# Patient Record
Sex: MALE | Race: WHITE | NOT HISPANIC OR LATINO | Employment: OTHER | URBAN - NONMETROPOLITAN AREA
[De-identification: names, ages, dates, MRNs, and addresses within clinical notes are randomized per-mention and may not be internally consistent; named-entity substitution may affect disease eponyms.]

---

## 2023-02-02 PROBLEM — M16.12 ARTHRITIS OF LEFT HIP: Status: ACTIVE | Noted: 2023-02-02

## 2023-02-02 PROBLEM — R19.5 POSITIVE COLORECTAL CANCER SCREENING USING COLOGUARD TEST: Status: ACTIVE | Noted: 2023-02-02

## 2023-02-02 PROBLEM — R51.9 FREQUENT HEADACHES: Status: ACTIVE | Noted: 2023-02-02

## 2023-02-02 PROBLEM — E78.5 DYSLIPIDEMIA: Status: ACTIVE | Noted: 2023-02-02

## 2023-02-02 PROBLEM — R06.83 SNORING: Status: ACTIVE | Noted: 2023-02-02

## 2023-02-02 PROBLEM — H40.9 GLAUCOMA, BILATERAL: Status: ACTIVE | Noted: 2023-02-02

## 2023-02-02 PROBLEM — E66.01 MORBID OBESITY (MULTI): Status: ACTIVE | Noted: 2023-02-02

## 2023-02-02 PROBLEM — E78.9 LIPID DISORDER: Status: ACTIVE | Noted: 2023-02-02

## 2023-02-02 PROBLEM — H61.20 CERUMEN IMPACTION: Status: ACTIVE | Noted: 2023-02-02

## 2023-02-02 PROBLEM — H66.90 OTITIS MEDIA: Status: ACTIVE | Noted: 2023-02-02

## 2023-02-02 PROBLEM — M79.10 GENERALIZED MUSCLE ACHE: Status: ACTIVE | Noted: 2023-02-02

## 2023-02-02 PROBLEM — H26.9: Status: ACTIVE | Noted: 2023-02-02

## 2023-02-02 PROBLEM — M54.50 LOW BACK PAIN: Status: ACTIVE | Noted: 2023-02-02

## 2023-02-02 PROBLEM — I10 BENIGN ESSENTIAL HYPERTENSION: Status: ACTIVE | Noted: 2023-02-02

## 2023-02-02 PROBLEM — I10 HTN (HYPERTENSION), BENIGN: Status: ACTIVE | Noted: 2023-02-02

## 2023-02-02 PROBLEM — E11.9 TYPE 2 DIABETES MELLITUS (MULTI): Status: ACTIVE | Noted: 2023-02-02

## 2023-02-02 PROBLEM — F41.9 ANXIETY DISORDER: Status: ACTIVE | Noted: 2023-02-02

## 2023-02-02 PROBLEM — J32.9 SINUS INFECTION: Status: ACTIVE | Noted: 2023-02-02

## 2023-02-02 PROBLEM — E66.01 OBESITY, MORBID, BMI 40.0-49.9 (MULTI): Status: ACTIVE | Noted: 2023-02-02

## 2023-02-02 PROBLEM — E78.5 HYPERLIPIDEMIA: Status: ACTIVE | Noted: 2023-02-02

## 2023-02-02 PROBLEM — E11.9 DIABETES MELLITUS TYPE 2, CONTROLLED (MULTI): Status: ACTIVE | Noted: 2023-02-02

## 2023-02-02 PROBLEM — H35.00 RETINOPATHY, BACKGROUND, NONPROLIFERATIVE, MILD: Status: ACTIVE | Noted: 2023-02-02

## 2023-02-02 PROBLEM — G47.33 OBSTRUCTIVE SLEEP APNEA, ADULT: Status: ACTIVE | Noted: 2023-02-02

## 2023-02-02 PROBLEM — M10.9 GOUT: Status: ACTIVE | Noted: 2023-02-02

## 2023-02-02 RX ORDER — IBANDRONATE SODIUM 150 MG/1
TABLET, FILM COATED ORAL
COMMUNITY
Start: 2021-07-16 | End: 2023-03-16 | Stop reason: ALTCHOICE

## 2023-02-02 RX ORDER — INSULIN GLARGINE 100 [IU]/ML
INJECTION, SOLUTION SUBCUTANEOUS
COMMUNITY
Start: 2021-02-22 | End: 2023-05-31 | Stop reason: SDUPTHER

## 2023-02-02 RX ORDER — OLMESARTAN MEDOXOMIL 40 MG/1
1 TABLET ORAL DAILY
COMMUNITY
Start: 2017-10-26 | End: 2023-06-02 | Stop reason: SDUPTHER

## 2023-02-02 RX ORDER — GLIPIZIDE 10 MG/1
10 TABLET, FILM COATED, EXTENDED RELEASE ORAL
COMMUNITY
Start: 2020-10-04 | End: 2023-12-05

## 2023-02-02 RX ORDER — LANCETS
EACH MISCELLANEOUS
COMMUNITY
End: 2023-03-16 | Stop reason: ALTCHOICE

## 2023-02-02 RX ORDER — NADOLOL 40 MG/1
1 TABLET ORAL
COMMUNITY
Start: 2021-04-19 | End: 2023-09-06

## 2023-02-02 RX ORDER — LIRAGLUTIDE 6 MG/ML
INJECTION SUBCUTANEOUS
COMMUNITY
Start: 2019-06-03 | End: 2023-03-16 | Stop reason: ALTCHOICE

## 2023-02-02 RX ORDER — SPIRONOLACTONE 50 MG/1
1 TABLET, FILM COATED ORAL DAILY
COMMUNITY
Start: 2021-08-16 | End: 2023-09-06

## 2023-02-02 RX ORDER — HYDROCODONE BITARTRATE AND ACETAMINOPHEN 5; 325 MG/1; MG/1
TABLET ORAL
COMMUNITY
End: 2023-03-16 | Stop reason: ALTCHOICE

## 2023-02-02 RX ORDER — DILTIAZEM HYDROCHLORIDE 360 MG/1
CAPSULE, EXTENDED RELEASE ORAL
COMMUNITY
Start: 2022-03-23 | End: 2023-03-16 | Stop reason: ALTCHOICE

## 2023-02-02 RX ORDER — EZETIMIBE 10 MG/1
1 TABLET ORAL DAILY
COMMUNITY
Start: 2020-12-22 | End: 2024-02-28 | Stop reason: SDUPTHER

## 2023-02-02 RX ORDER — ISOPROPYL ALCOHOL 70 ML/100ML
SWAB TOPICAL
COMMUNITY
End: 2023-03-16 | Stop reason: ALTCHOICE

## 2023-02-02 RX ORDER — PNEUMOCOCCAL 20-VALENT CONJUGATE VACCINE 2.2; 2.2; 2.2; 2.2; 2.2; 2.2; 2.2; 2.2; 2.2; 2.2; 2.2; 2.2; 2.2; 2.2; 2.2; 2.2; 4.4; 2.2; 2.2; 2.2 UG/.5ML; UG/.5ML; UG/.5ML; UG/.5ML; UG/.5ML; UG/.5ML; UG/.5ML; UG/.5ML; UG/.5ML; UG/.5ML; UG/.5ML; UG/.5ML; UG/.5ML; UG/.5ML; UG/.5ML; UG/.5ML; UG/.5ML; UG/.5ML; UG/.5ML; UG/.5ML
0.5 INJECTION, SUSPENSION INTRAMUSCULAR ONCE
COMMUNITY
End: 2023-03-16 | Stop reason: ALTCHOICE

## 2023-02-02 RX ORDER — CLONIDINE HYDROCHLORIDE 0.3 MG/1
TABLET ORAL
COMMUNITY
End: 2023-09-06

## 2023-02-02 RX ORDER — HYDROCHLOROTHIAZIDE 50 MG/1
50 TABLET ORAL
COMMUNITY
Start: 2014-02-27 | End: 2023-09-06

## 2023-03-13 LAB
ALANINE AMINOTRANSFERASE (SGPT) (U/L) IN SER/PLAS: 26 U/L (ref 10–52)
ALBUMIN (G/DL) IN SER/PLAS: 4.3 G/DL (ref 3.4–5)
ALKALINE PHOSPHATASE (U/L) IN SER/PLAS: 54 U/L (ref 33–136)
ANION GAP IN SER/PLAS: 12 MMOL/L (ref 10–20)
ASPARTATE AMINOTRANSFERASE (SGOT) (U/L) IN SER/PLAS: 23 U/L (ref 9–39)
BILIRUBIN TOTAL (MG/DL) IN SER/PLAS: 0.5 MG/DL (ref 0–1.2)
CALCIUM (MG/DL) IN SER/PLAS: 10.1 MG/DL (ref 8.6–10.3)
CARBON DIOXIDE, TOTAL (MMOL/L) IN SER/PLAS: 27 MMOL/L (ref 21–32)
CHLORIDE (MMOL/L) IN SER/PLAS: 103 MMOL/L (ref 98–107)
CREATININE (MG/DL) IN SER/PLAS: 1.4 MG/DL (ref 0.5–1.3)
ESTIMATED AVERAGE GLUCOSE FOR HBA1C: 235 MG/DL
GFR MALE: 55 ML/MIN/1.73M2
GLUCOSE (MG/DL) IN SER/PLAS: 113 MG/DL (ref 74–99)
HEMOGLOBIN A1C/HEMOGLOBIN TOTAL IN BLOOD: 9.8 %
POTASSIUM (MMOL/L) IN SER/PLAS: 4.9 MMOL/L (ref 3.5–5.3)
PROSTATE SPECIFIC ANTIGEN,SCREEN: 2.31 NG/ML (ref 0–4)
PROTEIN TOTAL: 7.5 G/DL (ref 6.4–8.2)
SODIUM (MMOL/L) IN SER/PLAS: 137 MMOL/L (ref 136–145)
UREA NITROGEN (MG/DL) IN SER/PLAS: 26 MG/DL (ref 6–23)

## 2023-03-16 ENCOUNTER — OFFICE VISIT (OUTPATIENT)
Dept: PRIMARY CARE | Facility: CLINIC | Age: 68
End: 2023-03-16
Payer: MEDICARE

## 2023-03-16 VITALS
DIASTOLIC BLOOD PRESSURE: 83 MMHG | WEIGHT: 252 LBS | HEIGHT: 66 IN | SYSTOLIC BLOOD PRESSURE: 125 MMHG | BODY MASS INDEX: 40.5 KG/M2 | HEART RATE: 73 BPM

## 2023-03-16 DIAGNOSIS — Z00.00 HEALTHCARE MAINTENANCE: ICD-10-CM

## 2023-03-16 DIAGNOSIS — E11.22 TYPE 2 DIABETES MELLITUS WITH STAGE 3A CHRONIC KIDNEY DISEASE, WITHOUT LONG-TERM CURRENT USE OF INSULIN (MULTI): ICD-10-CM

## 2023-03-16 DIAGNOSIS — I10 BENIGN ESSENTIAL HYPERTENSION: ICD-10-CM

## 2023-03-16 DIAGNOSIS — I10 HTN (HYPERTENSION), BENIGN: Primary | ICD-10-CM

## 2023-03-16 DIAGNOSIS — N18.31 TYPE 2 DIABETES MELLITUS WITH STAGE 3A CHRONIC KIDNEY DISEASE, WITHOUT LONG-TERM CURRENT USE OF INSULIN (MULTI): ICD-10-CM

## 2023-03-16 DIAGNOSIS — Z00.00 ROUTINE GENERAL MEDICAL EXAMINATION AT HEALTH CARE FACILITY: ICD-10-CM

## 2023-03-16 DIAGNOSIS — E66.01 CLASS 3 SEVERE OBESITY DUE TO EXCESS CALORIES WITH SERIOUS COMORBIDITY AND BODY MASS INDEX (BMI) OF 40.0 TO 44.9 IN ADULT (MULTI): ICD-10-CM

## 2023-03-16 PROCEDURE — 1160F RVW MEDS BY RX/DR IN RCRD: CPT | Performed by: INTERNAL MEDICINE

## 2023-03-16 PROCEDURE — 3079F DIAST BP 80-89 MM HG: CPT | Performed by: INTERNAL MEDICINE

## 2023-03-16 PROCEDURE — G0439 PPPS, SUBSEQ VISIT: HCPCS | Performed by: INTERNAL MEDICINE

## 2023-03-16 PROCEDURE — 1170F FXNL STATUS ASSESSED: CPT | Performed by: INTERNAL MEDICINE

## 2023-03-16 PROCEDURE — 3046F HEMOGLOBIN A1C LEVEL >9.0%: CPT | Performed by: INTERNAL MEDICINE

## 2023-03-16 PROCEDURE — 1159F MED LIST DOCD IN RCRD: CPT | Performed by: INTERNAL MEDICINE

## 2023-03-16 PROCEDURE — 4010F ACE/ARB THERAPY RXD/TAKEN: CPT | Performed by: INTERNAL MEDICINE

## 2023-03-16 PROCEDURE — 3074F SYST BP LT 130 MM HG: CPT | Performed by: INTERNAL MEDICINE

## 2023-03-16 PROCEDURE — 99214 OFFICE O/P EST MOD 30 MIN: CPT | Performed by: INTERNAL MEDICINE

## 2023-03-16 PROCEDURE — 99497 ADVNCD CARE PLAN 30 MIN: CPT | Performed by: INTERNAL MEDICINE

## 2023-03-16 PROCEDURE — 3008F BODY MASS INDEX DOCD: CPT | Performed by: INTERNAL MEDICINE

## 2023-03-16 PROCEDURE — 1036F TOBACCO NON-USER: CPT | Performed by: INTERNAL MEDICINE

## 2023-03-16 RX ORDER — PNEUMOCOCCAL 20-VALENT CONJUGATE VACCINE 2.2; 2.2; 2.2; 2.2; 2.2; 2.2; 2.2; 2.2; 2.2; 2.2; 2.2; 2.2; 2.2; 2.2; 2.2; 2.2; 4.4; 2.2; 2.2; 2.2 UG/.5ML; UG/.5ML; UG/.5ML; UG/.5ML; UG/.5ML; UG/.5ML; UG/.5ML; UG/.5ML; UG/.5ML; UG/.5ML; UG/.5ML; UG/.5ML; UG/.5ML; UG/.5ML; UG/.5ML; UG/.5ML; UG/.5ML; UG/.5ML; UG/.5ML; UG/.5ML
0.5 INJECTION, SUSPENSION INTRAMUSCULAR ONCE
Qty: 0.5 ML | Refills: 0 | Status: SHIPPED | OUTPATIENT
Start: 2023-03-16 | End: 2023-03-16 | Stop reason: SDUPTHER

## 2023-03-16 RX ORDER — ZOSTER VACCINE RECOMBINANT, ADJUVANTED 50 MCG/0.5
0.5 KIT INTRAMUSCULAR SEE ADMIN INSTRUCTIONS
Qty: 0.5 ML | Refills: 1 | Status: SHIPPED | OUTPATIENT
Start: 2023-03-16 | End: 2023-07-17 | Stop reason: ALTCHOICE

## 2023-03-16 RX ORDER — DILTIAZEM HYDROCHLORIDE 360 MG/1
360 CAPSULE, EXTENDED RELEASE ORAL DAILY
Qty: 90 CAPSULE | Refills: 3 | Status: SHIPPED | OUTPATIENT
Start: 2023-03-16 | End: 2023-09-06

## 2023-03-16 RX ORDER — DILTIAZEM HYDROCHLORIDE 360 MG/1
360 CAPSULE, EXTENDED RELEASE ORAL DAILY
COMMUNITY
End: 2023-03-16 | Stop reason: SDUPTHER

## 2023-03-16 RX ORDER — BLOOD SUGAR DIAGNOSTIC
STRIP MISCELLANEOUS
COMMUNITY

## 2023-03-16 RX ORDER — LANCETS 33 GAUGE
EACH MISCELLANEOUS
COMMUNITY
Start: 2023-03-08

## 2023-03-16 RX ORDER — DAPAGLIFLOZIN 5 MG/1
5 TABLET, FILM COATED ORAL DAILY
Qty: 30 TABLET | Refills: 3 | Status: SHIPPED | OUTPATIENT
Start: 2023-03-16 | End: 2023-07-17

## 2023-03-16 ASSESSMENT — ACTIVITIES OF DAILY LIVING (ADL)
DRESSING: INDEPENDENT
DOING_HOUSEWORK: INDEPENDENT
GROCERY_SHOPPING: INDEPENDENT
BATHING: INDEPENDENT
MANAGING_FINANCES: INDEPENDENT
TAKING_MEDICATION: INDEPENDENT

## 2023-03-16 ASSESSMENT — ENCOUNTER SYMPTOMS
GASTROINTESTINAL NEGATIVE: 1
RESPIRATORY NEGATIVE: 1
PSYCHIATRIC NEGATIVE: 1
WHEEZING: 0
JOINT SWELLING: 0
NEUROLOGICAL NEGATIVE: 1
DYSURIA: 0
NUMBNESS: 0
AGITATION: 0
VOMITING: 0
EYE DISCHARGE: 0
BACK PAIN: 0
LIGHT-HEADEDNESS: 0
CONSTIPATION: 0
CHILLS: 0
EYES NEGATIVE: 1
HEMATOLOGIC/LYMPHATIC NEGATIVE: 1
DIARRHEA: 0
FEVER: 0
ADENOPATHY: 0
COUGH: 0
ENDOCRINE NEGATIVE: 1
PALPITATIONS: 0
HEADACHES: 0
NAUSEA: 0
CARDIOVASCULAR NEGATIVE: 1
MUSCULOSKELETAL NEGATIVE: 1
CONSTITUTIONAL NEGATIVE: 1
CONFUSION: 0
SHORTNESS OF BREATH: 0
ABDOMINAL DISTENTION: 0
NECK STIFFNESS: 0
ALLERGIC/IMMUNOLOGIC NEGATIVE: 1
ABDOMINAL PAIN: 0

## 2023-03-16 ASSESSMENT — PATIENT HEALTH QUESTIONNAIRE - PHQ9
1. LITTLE INTEREST OR PLEASURE IN DOING THINGS: NOT AT ALL
2. FEELING DOWN, DEPRESSED OR HOPELESS: NOT AT ALL
SUM OF ALL RESPONSES TO PHQ9 QUESTIONS 1 AND 2: 0

## 2023-03-16 NOTE — PROGRESS NOTES
"Subjective   Reason for Visit: Av Bustos is an 67 y.o. male here for a Medicare Wellness visit.          Reviewed all medications by prescribing practitioner or clinical pharmacist (such as prescriptions, OTCs, herbal therapies and supplements) and documented in the medical record.     FEELS FINE, NO COMPLANT    Wellness exam        Patient Care Team:  Dmitriy Perry MD as PCP - General  [facility]mit[facility]     Review of Systems   Constitutional: Negative.  Negative for chills and fever.   HENT: Negative.  Negative for congestion.    Eyes: Negative.  Negative for discharge.   Respiratory: Negative.  Negative for cough, shortness of breath and wheezing.    Cardiovascular: Negative.  Negative for chest pain, palpitations and leg swelling.   Gastrointestinal: Negative.  Negative for abdominal distention, abdominal pain, constipation, diarrhea, nausea and vomiting.   Endocrine: Negative.    Genitourinary: Negative.  Negative for dysuria and urgency.   Musculoskeletal: Negative.  Negative for back pain, joint swelling and neck stiffness.   Skin: Negative.  Negative for rash.   Allergic/Immunologic: Negative.  Negative for immunocompromised state.   Neurological: Negative.  Negative for light-headedness, numbness and headaches.   Hematological: Negative.  Negative for adenopathy.   Psychiatric/Behavioral: Negative.  Negative for agitation, behavioral problems and confusion.    All other systems reviewed and are negative.      Objective   Vitals:  /83 (BP Location: Left arm, Patient Position: Sitting)   Pulse 73   Ht 1.676 m (5' 6\")   Wt 114 kg (252 lb)   BMI 40.67 kg/m²       Physical Exam  Vitals reviewed.   Constitutional:       General: He is not in acute distress.     Appearance: Normal appearance.   HENT:      Head: Normocephalic and atraumatic.      Nose: Nose normal.   Eyes:      Conjunctiva/sclera: Conjunctivae normal.      Pupils: Pupils are equal, " round, and reactive to light.   Neck:      Vascular: No carotid bruit.   Cardiovascular:      Rate and Rhythm: Normal rate and regular rhythm.      Pulses: Normal pulses.      Heart sounds:      No gallop.   Pulmonary:      Effort: Pulmonary effort is normal. No respiratory distress.      Breath sounds: Normal breath sounds. No wheezing.   Abdominal:      General: Bowel sounds are normal.      Palpations: Abdomen is soft.      Tenderness: There is no abdominal tenderness.   Musculoskeletal:         General: Normal range of motion.      Cervical back: Normal range of motion. No rigidity.   Lymphadenopathy:      Cervical: No cervical adenopathy.   Skin:     General: Skin is warm.      Findings: No rash.   Neurological:      General: No focal deficit present.      Mental Status: He is alert and oriented to person, place, and time.   Psychiatric:         Mood and Affect: Mood normal.         Behavior: Behavior normal.         Assessment/Plan   Problem List Items Addressed This Visit          Circulatory    Benign essential hypertension    Current Assessment & Plan     MONITOR BP   GOAL BP LOWER THAN 130/80  LOW SALT  EXERCISE DAILY           Relevant Orders    Follow Up In Primary Care    HTN (hypertension), benign - Primary    Relevant Medications    dilTIAZem ER (Tiazac) 360 mg 24 hr capsule    Other Relevant Orders    Follow Up In Primary Care       Endocrine/Metabolic    Type 2 diabetes mellitus (CMS/[facility])    Relevant Medications    dapagliflozin (Farxiga) 5 mg    Other Relevant Orders    Hemoglobin A1C    Comprehensive Metabolic Panel    Follow Up In Primary Care     Other Visit Diagnoses       Class 3 severe obesity due to excess calories with serious comorbidity and body mass index (BMI) of 40.0 to 44.9 in adult (CMS/[facility])        Relevant Orders    Follow Up In Primary Care    Healthcare maintenance        Relevant Medications    diphth,pertus,acell,,tetanus (BoostRIX) 2.5-8-5 Lf-mcg-Lf/0.5mL injection    zoster  vaccine-recombinant adjuvanted (Shingrix, PF,) 50 mcg/0.5 mL vaccine    Routine general medical examination at health care facility                 Advanced Care planning discussed with patient,diagnosis , treatment and prognosis discussed with pt,pt has capacity to make own decision, pt does not have an AD, advised to set one up and bring a copy for the chart.    Labs reviewed with pt    ALL ASSESSED CHRONIC CONDITIONS ARE STABLE OR ADDRESSED.

## 2023-03-17 DIAGNOSIS — E11.8 CONTROLLED TYPE 2 DIABETES MELLITUS WITH COMPLICATION, WITHOUT LONG-TERM CURRENT USE OF INSULIN (MULTI): Primary | ICD-10-CM

## 2023-05-31 DIAGNOSIS — E11.8 CONTROLLED DIABETES MELLITUS TYPE 2 WITH COMPLICATIONS, UNSPECIFIED WHETHER LONG TERM INSULIN USE (MULTI): Primary | ICD-10-CM

## 2023-06-01 RX ORDER — INSULIN GLARGINE 100 [IU]/ML
INJECTION, SOLUTION SUBCUTANEOUS
Qty: 15 ML | Refills: 10 | Status: SHIPPED | OUTPATIENT
Start: 2023-06-01 | End: 2023-07-17 | Stop reason: ALTCHOICE

## 2023-06-02 DIAGNOSIS — I10 HTN (HYPERTENSION), BENIGN: ICD-10-CM

## 2023-06-02 RX ORDER — OLMESARTAN MEDOXOMIL 40 MG/1
40 TABLET ORAL DAILY
Qty: 90 TABLET | Refills: 3 | Status: SHIPPED | OUTPATIENT
Start: 2023-06-02 | End: 2023-09-06

## 2023-06-03 LAB
GRAM STAIN: ABNORMAL
TISSUE/WOUND CULTURE/SMEAR: ABNORMAL

## 2023-07-07 ENCOUNTER — TELEPHONE (OUTPATIENT)
Dept: PRIMARY CARE | Facility: CLINIC | Age: 68
End: 2023-07-07
Payer: MEDICARE

## 2023-07-07 DIAGNOSIS — G47.33 OBSTRUCTIVE SLEEP APNEA, ADULT: ICD-10-CM

## 2023-07-07 NOTE — TELEPHONE ENCOUNTER
PT CALLED IN STATING HIS ORDER FOR A CPAP HAS  SO HE IS REQUESTING A NEW ORDER WITH F2F NOTES SENT TO [facility] [phone]. IS IT OKAY FOR A NEW ORDER?

## 2023-07-10 DIAGNOSIS — G47.33 OBSTRUCTIVE SLEEP APNEA, ADULT: Primary | ICD-10-CM

## 2023-07-12 ENCOUNTER — LAB (OUTPATIENT)
Dept: LAB | Facility: LAB | Age: 68
End: 2023-07-12
Payer: MEDICARE

## 2023-07-12 DIAGNOSIS — E11.22 TYPE 2 DIABETES MELLITUS WITH STAGE 3A CHRONIC KIDNEY DISEASE, WITHOUT LONG-TERM CURRENT USE OF INSULIN (MULTI): ICD-10-CM

## 2023-07-12 DIAGNOSIS — N18.31 TYPE 2 DIABETES MELLITUS WITH STAGE 3A CHRONIC KIDNEY DISEASE, WITHOUT LONG-TERM CURRENT USE OF INSULIN (MULTI): ICD-10-CM

## 2023-07-12 LAB
ALANINE AMINOTRANSFERASE (SGPT) (U/L) IN SER/PLAS: 25 U/L (ref 10–52)
ALBUMIN (G/DL) IN SER/PLAS: 4.3 G/DL (ref 3.4–5)
ALKALINE PHOSPHATASE (U/L) IN SER/PLAS: 56 U/L (ref 33–136)
ANION GAP IN SER/PLAS: 11 MMOL/L (ref 10–20)
ASPARTATE AMINOTRANSFERASE (SGOT) (U/L) IN SER/PLAS: 22 U/L (ref 9–39)
BILIRUBIN TOTAL (MG/DL) IN SER/PLAS: 0.5 MG/DL (ref 0–1.2)
CALCIUM (MG/DL) IN SER/PLAS: 10.1 MG/DL (ref 8.6–10.3)
CARBON DIOXIDE, TOTAL (MMOL/L) IN SER/PLAS: 28 MMOL/L (ref 21–32)
CHLORIDE (MMOL/L) IN SER/PLAS: 105 MMOL/L (ref 98–107)
CREATININE (MG/DL) IN SER/PLAS: 1.39 MG/DL (ref 0.5–1.3)
ESTIMATED AVERAGE GLUCOSE FOR HBA1C: 237 MG/DL
GFR MALE: 56 ML/MIN/1.73M2
GLUCOSE (MG/DL) IN SER/PLAS: 76 MG/DL (ref 74–99)
HEMOGLOBIN A1C/HEMOGLOBIN TOTAL IN BLOOD: 9.9 %
POTASSIUM (MMOL/L) IN SER/PLAS: 4.5 MMOL/L (ref 3.5–5.3)
PROTEIN TOTAL: 7 G/DL (ref 6.4–8.2)
SODIUM (MMOL/L) IN SER/PLAS: 139 MMOL/L (ref 136–145)
UREA NITROGEN (MG/DL) IN SER/PLAS: 27 MG/DL (ref 6–23)

## 2023-07-12 PROCEDURE — 36415 COLL VENOUS BLD VENIPUNCTURE: CPT

## 2023-07-12 PROCEDURE — 80053 COMPREHEN METABOLIC PANEL: CPT

## 2023-07-12 PROCEDURE — 83036 HEMOGLOBIN GLYCOSYLATED A1C: CPT

## 2023-07-17 ENCOUNTER — OFFICE VISIT (OUTPATIENT)
Dept: PRIMARY CARE | Facility: CLINIC | Age: 68
End: 2023-07-17
Payer: MEDICARE

## 2023-07-17 VITALS
HEART RATE: 67 BPM | BODY MASS INDEX: 40.34 KG/M2 | DIASTOLIC BLOOD PRESSURE: 80 MMHG | WEIGHT: 251 LBS | SYSTOLIC BLOOD PRESSURE: 128 MMHG | HEIGHT: 66 IN

## 2023-07-17 DIAGNOSIS — I10 HTN (HYPERTENSION), BENIGN: ICD-10-CM

## 2023-07-17 DIAGNOSIS — E66.01 CLASS 3 SEVERE OBESITY DUE TO EXCESS CALORIES WITH SERIOUS COMORBIDITY AND BODY MASS INDEX (BMI) OF 40.0 TO 44.9 IN ADULT (MULTI): ICD-10-CM

## 2023-07-17 DIAGNOSIS — E11.22 TYPE 2 DIABETES MELLITUS WITH STAGE 3A CHRONIC KIDNEY DISEASE, WITHOUT LONG-TERM CURRENT USE OF INSULIN (MULTI): ICD-10-CM

## 2023-07-17 DIAGNOSIS — Z00.00 HEALTHCARE MAINTENANCE: ICD-10-CM

## 2023-07-17 DIAGNOSIS — N18.31 TYPE 2 DIABETES MELLITUS WITH STAGE 3A CHRONIC KIDNEY DISEASE, WITHOUT LONG-TERM CURRENT USE OF INSULIN (MULTI): ICD-10-CM

## 2023-07-17 DIAGNOSIS — E66.01 OBESITY, MORBID, BMI 40.0-49.9 (MULTI): Primary | ICD-10-CM

## 2023-07-17 DIAGNOSIS — N18.31 STAGE 3A CHRONIC KIDNEY DISEASE (MULTI): ICD-10-CM

## 2023-07-17 DIAGNOSIS — I10 BENIGN ESSENTIAL HYPERTENSION: ICD-10-CM

## 2023-07-17 PROBLEM — H40.9 GLAUCOMA: Status: ACTIVE | Noted: 2023-07-17

## 2023-07-17 PROBLEM — M19.90 ARTHRITIS: Status: ACTIVE | Noted: 2023-07-17

## 2023-07-17 PROCEDURE — 3046F HEMOGLOBIN A1C LEVEL >9.0%: CPT | Performed by: INTERNAL MEDICINE

## 2023-07-17 PROCEDURE — 1036F TOBACCO NON-USER: CPT | Performed by: INTERNAL MEDICINE

## 2023-07-17 PROCEDURE — 4010F ACE/ARB THERAPY RXD/TAKEN: CPT | Performed by: INTERNAL MEDICINE

## 2023-07-17 PROCEDURE — 3008F BODY MASS INDEX DOCD: CPT | Performed by: INTERNAL MEDICINE

## 2023-07-17 PROCEDURE — 1159F MED LIST DOCD IN RCRD: CPT | Performed by: INTERNAL MEDICINE

## 2023-07-17 PROCEDURE — 3079F DIAST BP 80-89 MM HG: CPT | Performed by: INTERNAL MEDICINE

## 2023-07-17 PROCEDURE — 99214 OFFICE O/P EST MOD 30 MIN: CPT | Performed by: INTERNAL MEDICINE

## 2023-07-17 PROCEDURE — 1160F RVW MEDS BY RX/DR IN RCRD: CPT | Performed by: INTERNAL MEDICINE

## 2023-07-17 PROCEDURE — 3074F SYST BP LT 130 MM HG: CPT | Performed by: INTERNAL MEDICINE

## 2023-07-17 RX ORDER — INSULIN GLARGINE 100 [IU]/ML
80 INJECTION, SOLUTION SUBCUTANEOUS 2 TIMES DAILY
Qty: 3 ML | Refills: 12
Start: 2023-07-17 | End: 2023-09-06 | Stop reason: DRUGHIGH

## 2023-07-17 RX ORDER — METFORMIN HYDROCHLORIDE 500 MG/1
1000 TABLET ORAL
Qty: 360 TABLET | Refills: 3 | Status: SHIPPED | OUTPATIENT
Start: 2023-07-17 | End: 2024-02-12 | Stop reason: SDUPTHER

## 2023-07-17 RX ORDER — METFORMIN HYDROCHLORIDE 500 MG/1
1000 TABLET ORAL
Qty: 120 TABLET | Refills: 11 | Status: SHIPPED | OUTPATIENT
Start: 2023-07-17 | End: 2023-07-17 | Stop reason: SDUPTHER

## 2023-07-17 RX ORDER — ZOSTER VACCINE RECOMBINANT, ADJUVANTED 50 MCG/0.5
0.5 KIT INTRAMUSCULAR SEE ADMIN INSTRUCTIONS
Qty: 0.5 ML | Refills: 1 | Status: SHIPPED | OUTPATIENT
Start: 2023-07-17 | End: 2023-12-12 | Stop reason: ALTCHOICE

## 2023-07-17 ASSESSMENT — ENCOUNTER SYMPTOMS
ABDOMINAL PAIN: 0
HEMATOLOGIC/LYMPHATIC NEGATIVE: 1
LIGHT-HEADEDNESS: 0
GASTROINTESTINAL NEGATIVE: 1
EYES NEGATIVE: 1
CONSTITUTIONAL NEGATIVE: 1
EYE DISCHARGE: 0
CONFUSION: 0
AGITATION: 0
ALLERGIC/IMMUNOLOGIC NEGATIVE: 1
CARDIOVASCULAR NEGATIVE: 1
HEADACHES: 0
DYSURIA: 0
ADENOPATHY: 0
WHEEZING: 0
NAUSEA: 0
ABDOMINAL DISTENTION: 0
FEVER: 0
DIARRHEA: 0
BACK PAIN: 0
CONSTIPATION: 0
RESPIRATORY NEGATIVE: 1
VOMITING: 0
JOINT SWELLING: 0
SHORTNESS OF BREATH: 0
NUMBNESS: 0
PALPITATIONS: 0
NECK STIFFNESS: 0
COUGH: 0
ENDOCRINE NEGATIVE: 1
PSYCHIATRIC NEGATIVE: 1
NEUROLOGICAL NEGATIVE: 1
MUSCULOSKELETAL NEGATIVE: 1
CHILLS: 0

## 2023-07-17 ASSESSMENT — PATIENT HEALTH QUESTIONNAIRE - PHQ9
2. FEELING DOWN, DEPRESSED OR HOPELESS: NOT AT ALL
1. LITTLE INTEREST OR PLEASURE IN DOING THINGS: NOT AT ALL
SUM OF ALL RESPONSES TO PHQ9 QUESTIONS 1 AND 2: 0

## 2023-07-17 NOTE — PROGRESS NOTES
Subjective   Patient ID: NITA Bustos is a 67 y.o. male who presents for Follow-up (4 mo fu lab needs to discuss farxiga and jardiance to costly).   FEELS FINE, NO COMPLANT          Review of Systems   Constitutional: Negative.  Negative for chills and fever.   HENT: Negative.  Negative for congestion.    Eyes: Negative.  Negative for discharge.   Respiratory: Negative.  Negative for cough, shortness of breath and wheezing.    Cardiovascular: Negative.  Negative for chest pain, palpitations and leg swelling.   Gastrointestinal: Negative.  Negative for abdominal distention, abdominal pain, constipation, diarrhea, nausea and vomiting.   Endocrine: Negative.    Genitourinary: Negative.  Negative for dysuria and urgency.   Musculoskeletal: Negative.  Negative for back pain, joint swelling and neck stiffness.   Skin: Negative.  Negative for rash.   Allergic/Immunologic: Negative.  Negative for immunocompromised state.   Neurological: Negative.  Negative for light-headedness, numbness and headaches.   Hematological: Negative.  Negative for adenopathy.   Psychiatric/Behavioral: Negative.  Negative for agitation, behavioral problems and confusion.    All other systems reviewed and are negative.      Objective   Physical Exam  Vitals reviewed.   Constitutional:       General: He is not in acute distress.     Appearance: Normal appearance.   HENT:      Head: Normocephalic and atraumatic.      Nose: Nose normal.   Eyes:      Conjunctiva/sclera: Conjunctivae normal.      Pupils: Pupils are equal, round, and reactive to light.   Neck:      Vascular: No carotid bruit.   Cardiovascular:      Rate and Rhythm: Normal rate and regular rhythm.      Pulses: Normal pulses.      Heart sounds:      No gallop.   Pulmonary:      Effort: Pulmonary effort is normal. No respiratory distress.      Breath sounds: Normal breath sounds. No wheezing.   Abdominal:      General: Bowel sounds are normal.      Palpations: Abdomen is soft.       "Tenderness: There is no abdominal tenderness.   Musculoskeletal:         General: Normal range of motion.      Cervical back: Normal range of motion. No rigidity.   Lymphadenopathy:      Cervical: No cervical adenopathy.   Skin:     General: Skin is warm.      Findings: No rash.   Neurological:      General: No focal deficit present.      Mental Status: He is alert and oriented to person, place, and time.   Psychiatric:         Mood and Affect: Mood normal.         Behavior: Behavior normal.       /80 (BP Location: Left arm, Patient Position: Sitting)   Pulse 67   Ht 1.676 m (5' 6\")   Wt 114 kg (251 lb)   BMI 40.51 kg/m²    Hemoglobin A1C   Date/Time Value Ref Range Status   07/12/2023 10:39 AM 9.9 (A) % Final     Comment:          Diagnosis of Diabetes-Adults   Non-Diabetic: < or = 5.6%   Increased risk for developing diabetes: 5.7-6.4%   Diagnostic of diabetes: > or = 6.5%  .       Monitoring of Diabetes                Age (y)     Therapeutic Goal (%)   Adults:          >18           <7.0   Pediatrics:    13-18           <7.5                   7-12           <8.0                   0- 6            7.5-8.5   American Diabetes Association. Diabetes Care 33(S1), Jan 2010.   02/23/2021 11:48 AM 8.1 (H) 4.0 - 5.6 % Final     Assessment/Plan   Problem List Items Addressed This Visit       Benign essential hypertension    Relevant Orders    Comprehensive Metabolic Panel    Type 2 diabetes mellitus (CMS/HCC)    Relevant Medications    insulin glargine (Basaglar KwikPen U-100 Insulin) 100 unit/mL (3 mL) pen    metFORMIN (Glucophage) 500 mg tablet    Other Relevant Orders    Comprehensive Metabolic Panel    Hemoglobin A1C    Referral to Endocrinology    RESOLVED: HTN (hypertension), benign    Relevant Orders    Comprehensive Metabolic Panel    Obesity, morbid, BMI 40.0-49.9 (CMS/HCC) - Primary    Relevant Orders    Comprehensive Metabolic Panel     Other Visit Diagnoses       Class 3 severe obesity due to excess " calories with serious comorbidity and body mass index (BMI) of 40.0 to 44.9 in adult (CMS/[facility])        Relevant Orders    Comprehensive Metabolic Panel    Stage 3a chronic kidney disease        Relevant Orders    Comprehensive Metabolic Panel    Healthcare maintenance        Relevant Medications    zoster vaccine-recombinant adjuvanted (Shingrix, PF,) 50 mcg/0.5 mL vaccine             MONITOR BP   GOAL BP LOWER THAN 130/80  LOW SALT  EXERCISE DAILY    [address]  HGA1C GOAL LESS THAN 7  LOSE WT  EXERCISE DAILY    AVOID NSAIDS  INCREASE FLUID INTAKE    Has not taken jardiance or farxiga due to cost    Has been on basaglar 80 units bid  Labs reviewed with pt      MDM    1) COMPLEXITY: 1 OR MORE CHRONIC CONDITION WITH EXACERBATION, OR PROGRESSION OR SIDE EFFECT OF TREATMENT ADDRESSED  2)DATA: TESTS INTERPRETED AND OR ORDERED, TOOK INDEPENDENT HISTORY OR RECORDS REVIEWED  3)RISK: MODERATE RISK DUE TO NATURE OF MEDICAL CONDITIONS/COMORBIDITY OR MEDICATIONS ORDERED OR SURGICAL OR PROCEDURE REFERRAL, .

## 2023-08-30 ENCOUNTER — PATIENT OUTREACH (OUTPATIENT)
Dept: PRIMARY CARE | Facility: CLINIC | Age: 68
End: 2023-08-30
Payer: MEDICARE

## 2023-08-31 LAB — CORTISOL (UG/DL) IN SERUM - AM: 3 UG/DL (ref 5–20)

## 2023-09-05 LAB
METANEPHRINE: 0.42 NMOL/L
NORMETANEPHRINE: 1.1 NMOL/L

## 2023-09-06 ENCOUNTER — OFFICE VISIT (OUTPATIENT)
Dept: PRIMARY CARE | Facility: CLINIC | Age: 68
End: 2023-09-06
Payer: MEDICARE

## 2023-09-06 VITALS
SYSTOLIC BLOOD PRESSURE: 128 MMHG | HEART RATE: 102 BPM | WEIGHT: 245 LBS | BODY MASS INDEX: 40.82 KG/M2 | DIASTOLIC BLOOD PRESSURE: 80 MMHG | HEIGHT: 65 IN

## 2023-09-06 DIAGNOSIS — N18.31 STAGE 3A CHRONIC KIDNEY DISEASE (MULTI): ICD-10-CM

## 2023-09-06 DIAGNOSIS — I10 BENIGN ESSENTIAL HYPERTENSION: ICD-10-CM

## 2023-09-06 DIAGNOSIS — E87.5 HYPERKALEMIA: Primary | ICD-10-CM

## 2023-09-06 DIAGNOSIS — E66.01 OBESITY, MORBID, BMI 40.0-49.9 (MULTI): ICD-10-CM

## 2023-09-06 DIAGNOSIS — E11.22 TYPE 2 DIABETES MELLITUS WITH STAGE 3A CHRONIC KIDNEY DISEASE, WITHOUT LONG-TERM CURRENT USE OF INSULIN (MULTI): ICD-10-CM

## 2023-09-06 DIAGNOSIS — N18.31 TYPE 2 DIABETES MELLITUS WITH STAGE 3A CHRONIC KIDNEY DISEASE, WITHOUT LONG-TERM CURRENT USE OF INSULIN (MULTI): ICD-10-CM

## 2023-09-06 PROBLEM — R00.1 BRADYCARDIA: Status: ACTIVE | Noted: 2023-09-06

## 2023-09-06 PROBLEM — R00.0 TACHYCARDIA: Status: ACTIVE | Noted: 2023-09-06

## 2023-09-06 PROBLEM — R55 SYNCOPE: Status: ACTIVE | Noted: 2023-09-06

## 2023-09-06 PROCEDURE — 3066F NEPHROPATHY DOC TX: CPT | Performed by: INTERNAL MEDICINE

## 2023-09-06 PROCEDURE — 3074F SYST BP LT 130 MM HG: CPT | Performed by: INTERNAL MEDICINE

## 2023-09-06 PROCEDURE — 3008F BODY MASS INDEX DOCD: CPT | Performed by: INTERNAL MEDICINE

## 2023-09-06 PROCEDURE — 1159F MED LIST DOCD IN RCRD: CPT | Performed by: INTERNAL MEDICINE

## 2023-09-06 PROCEDURE — 99214 OFFICE O/P EST MOD 30 MIN: CPT | Performed by: INTERNAL MEDICINE

## 2023-09-06 PROCEDURE — 1036F TOBACCO NON-USER: CPT | Performed by: INTERNAL MEDICINE

## 2023-09-06 PROCEDURE — 1160F RVW MEDS BY RX/DR IN RCRD: CPT | Performed by: INTERNAL MEDICINE

## 2023-09-06 PROCEDURE — 3079F DIAST BP 80-89 MM HG: CPT | Performed by: INTERNAL MEDICINE

## 2023-09-06 PROCEDURE — 3046F HEMOGLOBIN A1C LEVEL >9.0%: CPT | Performed by: INTERNAL MEDICINE

## 2023-09-06 RX ORDER — INSULIN ASPART 100 [IU]/ML
INJECTION, SOLUTION INTRAVENOUS; SUBCUTANEOUS
COMMUNITY
End: 2024-01-09 | Stop reason: WASHOUT

## 2023-09-06 RX ORDER — ROSUVASTATIN CALCIUM 5 MG/1
TABLET, COATED ORAL NIGHTLY
COMMUNITY
Start: 2023-07-24

## 2023-09-06 RX ORDER — NIFEDIPINE 30 MG/1
1 TABLET, FILM COATED, EXTENDED RELEASE ORAL DAILY
COMMUNITY
Start: 2023-08-24 | End: 2023-09-13

## 2023-09-06 RX ORDER — METOPROLOL TARTRATE 100 MG/1
TABLET ORAL 2 TIMES DAILY
COMMUNITY
Start: 2023-08-28 | End: 2023-12-27 | Stop reason: SDUPTHER

## 2023-09-06 RX ORDER — INSULIN GLARGINE 100 [IU]/ML
35 INJECTION, SOLUTION SUBCUTANEOUS EVERY MORNING
Qty: 3 ML | Refills: 12 | Status: SHIPPED | OUTPATIENT
Start: 2023-09-06 | End: 2024-02-28 | Stop reason: DRUGHIGH

## 2023-09-06 ASSESSMENT — ENCOUNTER SYMPTOMS
EYE DISCHARGE: 0
CONFUSION: 0
JOINT SWELLING: 0
CONSTIPATION: 0
CONSTITUTIONAL NEGATIVE: 1
NECK STIFFNESS: 0
PSYCHIATRIC NEGATIVE: 1
WHEEZING: 0
FEVER: 0
CHILLS: 0
SHORTNESS OF BREATH: 0
GASTROINTESTINAL NEGATIVE: 1
EYES NEGATIVE: 1
ADENOPATHY: 0
PALPITATIONS: 0
NAUSEA: 0
COUGH: 0
MUSCULOSKELETAL NEGATIVE: 1
ABDOMINAL DISTENTION: 0
RESPIRATORY NEGATIVE: 1
NEUROLOGICAL NEGATIVE: 1
DIARRHEA: 0
ENDOCRINE NEGATIVE: 1
HEMATOLOGIC/LYMPHATIC NEGATIVE: 1
NUMBNESS: 0
BACK PAIN: 0
ALLERGIC/IMMUNOLOGIC NEGATIVE: 1
HEADACHES: 0
ABDOMINAL PAIN: 0
DYSURIA: 0
CARDIOVASCULAR NEGATIVE: 1
AGITATION: 0
LIGHT-HEADEDNESS: 0
VOMITING: 0

## 2023-09-06 NOTE — PROGRESS NOTES
Subjective   Patient ID: NITA Bustos is a 67 y.o. male who presents for Follow-up (HOSPITAL DC FOR POTASSIUM).  HERE FOR FU AFTER DC FOR HIGH K  TAKE BASAGLAR 35 IN AM DR HIGUERA ADJUSTED        Review of Systems   Constitutional: Negative.  Negative for chills and fever.   HENT: Negative.  Negative for congestion.    Eyes: Negative.  Negative for discharge.   Respiratory: Negative.  Negative for cough, shortness of breath and wheezing.    Cardiovascular: Negative.  Negative for chest pain, palpitations and leg swelling.   Gastrointestinal: Negative.  Negative for abdominal distention, abdominal pain, constipation, diarrhea, nausea and vomiting.   Endocrine: Negative.    Genitourinary: Negative.  Negative for dysuria and urgency.   Musculoskeletal: Negative.  Negative for back pain, joint swelling and neck stiffness.   Skin: Negative.  Negative for rash.   Allergic/Immunologic: Negative.  Negative for immunocompromised state.   Neurological: Negative.  Negative for light-headedness, numbness and headaches.   Hematological: Negative.  Negative for adenopathy.   Psychiatric/Behavioral: Negative.  Negative for agitation, behavioral problems and confusion.    All other systems reviewed and are negative.      Objective   Physical Exam  Vitals reviewed.   Constitutional:       General: He is not in acute distress.     Appearance: He is obese.   HENT:      Head: Normocephalic and atraumatic.      Nose: Nose normal.   Eyes:      Conjunctiva/sclera: Conjunctivae normal.      Pupils: Pupils are equal, round, and reactive to light.   Neck:      Vascular: No carotid bruit.   Cardiovascular:      Rate and Rhythm: Normal rate and regular rhythm.      Pulses: Normal pulses.      Heart sounds:      No gallop.   Pulmonary:      Effort: Pulmonary effort is normal. No respiratory distress.      Breath sounds: Normal breath sounds. No wheezing.   Abdominal:      General: Bowel sounds are normal.      Palpations: Abdomen is soft.     "  Tenderness: There is no abdominal tenderness.   Musculoskeletal:         General: Normal range of motion.      Cervical back: Normal range of motion. No rigidity.      Right lower leg: Edema (MILD) present.      Left lower leg: Edema (MILD) present.   Lymphadenopathy:      Cervical: No cervical adenopathy.   Skin:     General: Skin is warm.      Findings: No rash.   Neurological:      General: No focal deficit present.      Mental Status: He is alert and oriented to person, place, and time.   Psychiatric:         Mood and Affect: Mood normal.         Behavior: Behavior normal.       /80 (BP Location: Left arm, Patient Position: Sitting)   Pulse 102   Ht 1.651 m (5' 5\")   Wt 111 kg (245 lb)   BMI 40.77 kg/m²    Hemoglobin A1C   Date/Time Value Ref Range Status   07/12/2023 10:39 AM 9.9 (A) % Final     Comment:          Diagnosis of Diabetes-Adults   Non-Diabetic: < or = 5.6%   Increased risk for developing diabetes: 5.7-6.4%   Diagnostic of diabetes: > or = 6.5%  .       Monitoring of Diabetes                Age (y)     Therapeutic Goal (%)   Adults:          >18           <7.0   Pediatrics:    13-18           <7.5                   7-12           <8.0                   0- 6            7.5-8.5   American Diabetes Association. Diabetes Care 33(S1), Jan 2010.   02/23/2021 11:48 AM 8.1 (H) 4.0 - 5.6 % Final     Assessment/Plan   Problem List Items Addressed This Visit       Benign essential hypertension    Type 2 diabetes mellitus (CMS/HCC)    Relevant Medications    insulin glargine (Basaglar KwikPen U-100 Insulin) 100 unit/mL (3 mL) pen    Obesity, morbid, BMI 40.0-49.9 (CMS/HCC)    Hyperkalemia - Primary    Relevant Orders    Basic Metabolic Panel    Stage 3a chronic kidney disease (CMS/HCC)      CLINICALLY IMPROVING    SEES ENDO    SEES NEPHRO    SEES CARDIO    FU 1 WEEK BMP      MDM    1) COMPLEXITY: 1 OR MORE CHRONIC CONDITION WITH EXACERBATION, OR PROGRESSION OR SIDE EFFECT OF TREATMENT " ADDRESSED  2)DATA: TESTS INTERPRETED AND OR ORDERED, TOOK INDEPENDENT HISTORY OR RECORDS REVIEWED  3)RISK: MODERATE RISK DUE TO NATURE OF MEDICAL CONDITIONS/COMORBIDITY OR MEDICATIONS ORDERED OR SURGICAL OR PROCEDURE REFERRAL, .

## 2023-09-07 LAB
ALANINE AMINOTRANSFERASE (SGPT) (U/L) IN SER/PLAS: 17 U/L (ref 10–52)
ALBUMIN (G/DL) IN SER/PLAS: 3.7 G/DL (ref 3.4–5)
ALKALINE PHOSPHATASE (U/L) IN SER/PLAS: 50 U/L (ref 33–136)
ANION GAP IN SER/PLAS: 14 MMOL/L (ref 10–20)
ASPARTATE AMINOTRANSFERASE (SGOT) (U/L) IN SER/PLAS: 17 U/L (ref 9–39)
BILIRUBIN TOTAL (MG/DL) IN SER/PLAS: 0.5 MG/DL (ref 0–1.2)
CALCIUM (MG/DL) IN SER/PLAS: 8.8 MG/DL (ref 8.6–10.3)
CARBON DIOXIDE, TOTAL (MMOL/L) IN SER/PLAS: 23 MMOL/L (ref 21–32)
CHLORIDE (MMOL/L) IN SER/PLAS: 106 MMOL/L (ref 98–107)
CREATININE (MG/DL) IN SER/PLAS: 1.03 MG/DL (ref 0.5–1.3)
ERYTHROCYTE DISTRIBUTION WIDTH (RATIO) BY AUTOMATED COUNT: 12.9 % (ref 11.5–14.5)
ERYTHROCYTE MEAN CORPUSCULAR HEMOGLOBIN CONCENTRATION (G/DL) BY AUTOMATED: 31.5 G/DL (ref 32–36)
ERYTHROCYTE MEAN CORPUSCULAR VOLUME (FL) BY AUTOMATED COUNT: 92 FL (ref 80–100)
ERYTHROCYTES (10*6/UL) IN BLOOD BY AUTOMATED COUNT: 4.1 X10E12/L (ref 4.5–5.9)
GFR MALE: 80 ML/MIN/1.73M2
GLUCOSE (MG/DL) IN SER/PLAS: 215 MG/DL (ref 74–99)
HEMATOCRIT (%) IN BLOOD BY AUTOMATED COUNT: 37.8 % (ref 41–52)
HEMOGLOBIN (G/DL) IN BLOOD: 11.9 G/DL (ref 13.5–17.5)
LEUKOCYTES (10*3/UL) IN BLOOD BY AUTOMATED COUNT: 7.6 X10E9/L (ref 4.4–11.3)
PLATELETS (10*3/UL) IN BLOOD AUTOMATED COUNT: 232 X10E9/L (ref 150–450)
POTASSIUM (MMOL/L) IN SER/PLAS: 4 MMOL/L (ref 3.5–5.3)
PROTEIN TOTAL: 6.6 G/DL (ref 6.4–8.2)
SODIUM (MMOL/L) IN SER/PLAS: 139 MMOL/L (ref 136–145)
UREA NITROGEN (MG/DL) IN SER/PLAS: 13 MG/DL (ref 6–23)

## 2023-09-11 LAB
ALDOSTERONE IN SERUM: 14.8 NG/DL
RENIN ACTIVITY: 1.4 NG/ML/HR

## 2023-09-12 ENCOUNTER — PATIENT OUTREACH (OUTPATIENT)
Dept: PRIMARY CARE | Facility: CLINIC | Age: 68
End: 2023-09-12

## 2023-09-12 ENCOUNTER — LAB (OUTPATIENT)
Dept: LAB | Facility: LAB | Age: 68
End: 2023-09-12
Payer: MEDICARE

## 2023-09-12 DIAGNOSIS — E87.5 HYPERKALEMIA: ICD-10-CM

## 2023-09-12 LAB
ANION GAP IN SER/PLAS: 14 MMOL/L (ref 10–20)
CALCIUM (MG/DL) IN SER/PLAS: 9.1 MG/DL (ref 8.6–10.3)
CARBON DIOXIDE, TOTAL (MMOL/L) IN SER/PLAS: 22 MMOL/L (ref 21–32)
CHLORIDE (MMOL/L) IN SER/PLAS: 105 MMOL/L (ref 98–107)
CREATININE (MG/DL) IN SER/PLAS: 1 MG/DL (ref 0.5–1.3)
GFR MALE: 83 ML/MIN/1.73M2
GLUCOSE (MG/DL) IN SER/PLAS: 229 MG/DL (ref 74–99)
POTASSIUM (MMOL/L) IN SER/PLAS: 4.2 MMOL/L (ref 3.5–5.3)
SODIUM (MMOL/L) IN SER/PLAS: 137 MMOL/L (ref 136–145)
UREA NITROGEN (MG/DL) IN SER/PLAS: 13 MG/DL (ref 6–23)

## 2023-09-12 PROCEDURE — 36415 COLL VENOUS BLD VENIPUNCTURE: CPT

## 2023-09-12 PROCEDURE — 80048 BASIC METABOLIC PNL TOTAL CA: CPT

## 2023-09-12 NOTE — PROGRESS NOTES
Unable to reach patient for call back after patient's follow up appointment with PCP.   LVM with call back number for patient to call if needed

## 2023-09-13 ENCOUNTER — OFFICE VISIT (OUTPATIENT)
Dept: PRIMARY CARE | Facility: CLINIC | Age: 68
End: 2023-09-13
Payer: MEDICARE

## 2023-09-13 VITALS
WEIGHT: 244 LBS | SYSTOLIC BLOOD PRESSURE: 119 MMHG | HEART RATE: 86 BPM | BODY MASS INDEX: 40.65 KG/M2 | DIASTOLIC BLOOD PRESSURE: 77 MMHG | HEIGHT: 65 IN

## 2023-09-13 DIAGNOSIS — E11.65 TYPE 2 DIABETES MELLITUS WITH HYPERGLYCEMIA, WITH LONG-TERM CURRENT USE OF INSULIN (MULTI): Primary | ICD-10-CM

## 2023-09-13 DIAGNOSIS — E66.01 OBESITY, MORBID, BMI 40.0-49.9 (MULTI): ICD-10-CM

## 2023-09-13 DIAGNOSIS — E87.5 HYPERKALEMIA: ICD-10-CM

## 2023-09-13 DIAGNOSIS — I10 BENIGN ESSENTIAL HYPERTENSION: ICD-10-CM

## 2023-09-13 DIAGNOSIS — Z79.4 TYPE 2 DIABETES MELLITUS WITH HYPERGLYCEMIA, WITH LONG-TERM CURRENT USE OF INSULIN (MULTI): Primary | ICD-10-CM

## 2023-09-13 DIAGNOSIS — G47.33 OBSTRUCTIVE SLEEP APNEA, ADULT: ICD-10-CM

## 2023-09-13 PROBLEM — N18.9 CHRONIC KIDNEY DISEASE: Status: ACTIVE | Noted: 2023-09-13

## 2023-09-13 PROBLEM — M16.12 ARTHRITIS OF LEFT HIP: Status: RESOLVED | Noted: 2023-02-02 | Resolved: 2023-09-13

## 2023-09-13 PROCEDURE — 3066F NEPHROPATHY DOC TX: CPT | Performed by: INTERNAL MEDICINE

## 2023-09-13 PROCEDURE — 3074F SYST BP LT 130 MM HG: CPT | Performed by: INTERNAL MEDICINE

## 2023-09-13 PROCEDURE — 1160F RVW MEDS BY RX/DR IN RCRD: CPT | Performed by: INTERNAL MEDICINE

## 2023-09-13 PROCEDURE — 3008F BODY MASS INDEX DOCD: CPT | Performed by: INTERNAL MEDICINE

## 2023-09-13 PROCEDURE — 99214 OFFICE O/P EST MOD 30 MIN: CPT | Performed by: INTERNAL MEDICINE

## 2023-09-13 PROCEDURE — 1036F TOBACCO NON-USER: CPT | Performed by: INTERNAL MEDICINE

## 2023-09-13 PROCEDURE — 3046F HEMOGLOBIN A1C LEVEL >9.0%: CPT | Performed by: INTERNAL MEDICINE

## 2023-09-13 PROCEDURE — 3078F DIAST BP <80 MM HG: CPT | Performed by: INTERNAL MEDICINE

## 2023-09-13 PROCEDURE — 1159F MED LIST DOCD IN RCRD: CPT | Performed by: INTERNAL MEDICINE

## 2023-09-13 RX ORDER — NIFEDIPINE 60 MG/1
60 TABLET, EXTENDED RELEASE ORAL DAILY
COMMUNITY
Start: 2023-09-08 | End: 2024-01-09 | Stop reason: SDDI

## 2023-09-13 ASSESSMENT — ENCOUNTER SYMPTOMS
RESPIRATORY NEGATIVE: 1
NECK STIFFNESS: 0
NEUROLOGICAL NEGATIVE: 1
LIGHT-HEADEDNESS: 0
ADENOPATHY: 0
EYES NEGATIVE: 1
CONSTITUTIONAL NEGATIVE: 1
AGITATION: 0
COUGH: 0
HEMATOLOGIC/LYMPHATIC NEGATIVE: 1
CONFUSION: 0
ENDOCRINE NEGATIVE: 1
VOMITING: 0
DYSURIA: 0
PSYCHIATRIC NEGATIVE: 1
WHEEZING: 0
FEVER: 0
PALPITATIONS: 0
CONSTIPATION: 0
NUMBNESS: 0
MUSCULOSKELETAL NEGATIVE: 1
CARDIOVASCULAR NEGATIVE: 1
NAUSEA: 0
GASTROINTESTINAL NEGATIVE: 1
ABDOMINAL PAIN: 0
CHILLS: 0
JOINT SWELLING: 0
BACK PAIN: 0
HEADACHES: 0
EYE DISCHARGE: 0
DIARRHEA: 0
ALLERGIC/IMMUNOLOGIC NEGATIVE: 1
SHORTNESS OF BREATH: 0
ABDOMINAL DISTENTION: 0

## 2023-09-13 NOTE — PROGRESS NOTES
Subjective   Patient ID: NITA Bustos is a 67 y.o. male who presents for Follow-up (1 WK LAB).  HERE FOR FU WITH LABS        Review of Systems   Constitutional: Negative.  Negative for chills and fever.   HENT: Negative.  Negative for congestion.    Eyes: Negative.  Negative for discharge.   Respiratory: Negative.  Negative for cough, shortness of breath and wheezing.    Cardiovascular: Negative.  Negative for chest pain, palpitations and leg swelling.   Gastrointestinal: Negative.  Negative for abdominal distention, abdominal pain, constipation, diarrhea, nausea and vomiting.   Endocrine: Negative.    Genitourinary: Negative.  Negative for dysuria and urgency.   Musculoskeletal: Negative.  Negative for back pain, joint swelling and neck stiffness.   Skin: Negative.  Negative for rash.   Allergic/Immunologic: Negative.  Negative for immunocompromised state.   Neurological: Negative.  Negative for light-headedness, numbness and headaches.   Hematological: Negative.  Negative for adenopathy.   Psychiatric/Behavioral: Negative.  Negative for agitation, behavioral problems and confusion.    All other systems reviewed and are negative.      Objective   Physical Exam  Vitals reviewed.   Constitutional:       General: He is not in acute distress.     Appearance: He is obese.   HENT:      Head: Normocephalic and atraumatic.      Nose: Nose normal.   Eyes:      Conjunctiva/sclera: Conjunctivae normal.      Pupils: Pupils are equal, round, and reactive to light.   Neck:      Vascular: No carotid bruit.   Cardiovascular:      Rate and Rhythm: Normal rate and regular rhythm.      Pulses: Normal pulses.      Heart sounds:      No gallop.   Pulmonary:      Effort: Pulmonary effort is normal. No respiratory distress.      Breath sounds: Normal breath sounds. No wheezing.   Abdominal:      General: Bowel sounds are normal.      Palpations: Abdomen is soft.      Tenderness: There is no abdominal tenderness.   Musculoskeletal:    "      General: Normal range of motion.      Cervical back: Normal range of motion. No rigidity.   Lymphadenopathy:      Cervical: No cervical adenopathy.   Skin:     General: Skin is warm.      Findings: No rash.   Neurological:      General: No focal deficit present.      Mental Status: He is alert and oriented to person, place, and time.   Psychiatric:         Mood and Affect: Mood normal.         Behavior: Behavior normal.       /77 (BP Location: Left arm, Patient Position: Sitting)   Pulse 86   Ht 1.651 m (5' 5\")   Wt 111 kg (244 lb)   BMI 40.60 kg/m²    Hemoglobin A1C   Date/Time Value Ref Range Status   07/12/2023 10:39 AM 9.9 (A) % Final     Comment:          Diagnosis of Diabetes-Adults   Non-Diabetic: < or = 5.6%   Increased risk for developing diabetes: 5.7-6.4%   Diagnostic of diabetes: > or = 6.5%  .       Monitoring of Diabetes                Age (y)     Therapeutic Goal (%)   Adults:          >18           <7.0   Pediatrics:    13-18           <7.5                   7-12           <8.0                   0- 6            7.5-8.5   American Diabetes Association. Diabetes Care 33(S1), Jan 2010.   02/23/2021 11:48 AM 8.1 (H) 4.0 - 5.6 % Final     Assessment/Plan   Problem List Items Addressed This Visit       Benign essential hypertension    Type 2 diabetes mellitus (CMS/HCC) - Primary    Obesity, morbid, BMI 40.0-49.9 (CMS/HCC)    Obstructive sleep apnea, adult    Hyperkalemia    Relevant Orders    Basic Metabolic Panel      CLINICALLY DOING FINE    Labs reviewed with pt    SEES ENDO AND SHE IS ADJUSTING INSULIN    [address]  HGA1C GOAL LESS THAN 7  LOSE WT  EXERCISE DAILY    MONITOR BP   GOAL BP LOWER THAN 130/80  LOW SALT  EXERCISE DAILY      MDM    1) COMPLEXITY: MORE THAN 1 STABLE CHRONIC CONDITION ADDRESSED  2)DATA: TESTS INTERPRETED AND OR ORDERED, TOOK INDEPENDENT HISTORY OR RECORDS REVIEWED  3)RISK: MODERATE RISK DUE TO NATURE OF MEDICAL CONDITIONS/COMORBIDITY OR MEDICATIONS ORDERED OR " SURGICAL OR PROCEDURE REFERRAL, .    FU 2 MO FOR K AND GFR CHECK PER PT REQUEST

## 2023-09-20 LAB
IRON (UG/DL) IN SER/PLAS: 66 UG/DL (ref 35–150)
MAGNESIUM (MG/DL) IN SER/PLAS: 1.64 MG/DL (ref 1.6–2.4)
THYROTROPIN (MIU/L) IN SER/PLAS BY DETECTION LIMIT <= 0.05 MIU/L: 1.94 MIU/L (ref 0.44–3.98)
THYROXINE (T4) FREE (NG/DL) IN SER/PLAS: 0.68 NG/DL (ref 0.61–1.12)

## 2023-09-21 LAB
COLLECTION DURATION OF URINE: 24 HRS
CREATININE (MG/24HR) IN 24 HOUR URINE: 1.54 G/24H (ref 0.87–2.41)
CREATININE (MG/DL) IN URINE: 88 MG/DL (ref 20–370)
VOLUME OF URINE: 1750 ML

## 2023-09-22 LAB
METANEPHRINES, 24 HOUR URINE: NORMAL
METANEPHRINES, URINE: NORMAL
NORMETANEPHRINE 24 HOUR URINE: NORMAL
NORMETANEPHRINE, URINE: NORMAL
VOLUME OF URINE (LC): NORMAL

## 2023-09-26 LAB
CORTISOL UR FREE PER VOLUME: 14.4 UG/L
CORTISOL, URINE FREE - PER 24H: 25.2 UG/D
CORTISOL, URINE INTERPRETATION: NORMAL
CORTISOL/CREATININE RATIO: 18.7 UG/G CRT
CREATININE 24 HOUR URINE: 1348 MG/D (ref 800–2100)
CREATININE, URINE - PER VOLUME: 77 MG/DL
HOURS COLLECTED (ARUP): 24
METANEPHRINES, 24 HOUR URINE: 257 UG/24 HR (ref 58–276)
METANEPHRINES, URINE: 147 UG/L
NORMETANEPHRINE 24 HOUR URINE: 686 UG/24 HR (ref 156–729)
NORMETANEPHRINE, URINE: 392 UG/L
TOTAL VOLUME (ARUP): 1750
VOLUME OF URINE (LC): 1750

## 2023-09-28 LAB
DOPAMINE, 24 HOUR URINE: NORMAL
DOPAMINE, URINE: NORMAL
EPINEPHRINE, 24 HOUR URINE: NORMAL
EPINEPHRINE, URINE: NORMAL
NOREPINEPHRINE, 24 HOUR URINE: NORMAL
NOREPINEPHRINE, URINE: NORMAL
VOLUME OF URINE (LC): NORMAL

## 2023-10-09 ENCOUNTER — HOSPITAL ENCOUNTER (OUTPATIENT)
Dept: HOSPITAL 100 - NS | Age: 68
LOS: 22 days | End: 2023-10-31
Payer: MEDICARE

## 2023-10-09 DIAGNOSIS — E11.9: ICD-10-CM

## 2023-10-09 DIAGNOSIS — Z71.3: Primary | ICD-10-CM

## 2023-10-09 PROCEDURE — 97802 MEDICAL NUTRITION INDIV IN: CPT

## 2023-10-10 DIAGNOSIS — I10 ESSENTIAL (PRIMARY) HYPERTENSION: Primary | ICD-10-CM

## 2023-10-12 ENCOUNTER — PATIENT OUTREACH (OUTPATIENT)
Dept: PRIMARY CARE | Facility: CLINIC | Age: 68
End: 2023-10-12
Payer: MEDICARE

## 2023-10-16 ENCOUNTER — LAB (OUTPATIENT)
Dept: LAB | Facility: LAB | Age: 68
End: 2023-10-16
Payer: MEDICARE

## 2023-10-16 DIAGNOSIS — I10 ESSENTIAL (PRIMARY) HYPERTENSION: ICD-10-CM

## 2023-10-16 LAB
COLLECT DURATION TIME SPEC: 24 HRS
CREAT 24H UR-MCNC: 106 MG/DL (ref 20–370)
CREAT 24H UR-MRATE: 1.59 G/24 H
SPECIMEN VOL 24H UR: 1500 ML

## 2023-10-16 PROCEDURE — 82384 ASSAY THREE CATECHOLAMINES: CPT

## 2023-10-16 PROCEDURE — 81050 URINALYSIS VOLUME MEASURE: CPT

## 2023-10-16 PROCEDURE — 36415 COLL VENOUS BLD VENIPUNCTURE: CPT

## 2023-10-16 PROCEDURE — 82570 ASSAY OF URINE CREATININE: CPT

## 2023-10-23 LAB
DOPAMINE 24H UR-MRATE: 351 UG/24 HR (ref 0–510)
DOPAMINE UR-MCNC: 234 UG/L
EPINEPH 24H UR-MRATE: 6 UG/24 HR (ref 0–20)
EPINEPH UR-MCNC: 4 UG/L
NOREPINEPH 24H UR-MRATE: 105 UG/24 HR (ref 0–135)
NOREPINEPH UR-MCNC: 70 UG/L

## 2023-11-02 ENCOUNTER — TELEPHONE (OUTPATIENT)
Dept: PRIMARY CARE | Facility: CLINIC | Age: 68
End: 2023-11-02
Payer: MEDICARE

## 2023-11-02 DIAGNOSIS — G47.33 OBSTRUCTIVE SLEEP APNEA, ADULT: Primary | ICD-10-CM

## 2023-11-08 ENCOUNTER — LAB (OUTPATIENT)
Dept: LAB | Facility: LAB | Age: 68
End: 2023-11-08
Payer: MEDICARE

## 2023-11-08 DIAGNOSIS — I10 HTN (HYPERTENSION), BENIGN: ICD-10-CM

## 2023-11-08 DIAGNOSIS — N18.31 STAGE 3A CHRONIC KIDNEY DISEASE (MULTI): ICD-10-CM

## 2023-11-08 DIAGNOSIS — E87.5 HYPERKALEMIA: ICD-10-CM

## 2023-11-08 DIAGNOSIS — E66.01 CLASS 3 SEVERE OBESITY DUE TO EXCESS CALORIES WITH SERIOUS COMORBIDITY AND BODY MASS INDEX (BMI) OF 40.0 TO 44.9 IN ADULT (MULTI): ICD-10-CM

## 2023-11-08 DIAGNOSIS — I10 BENIGN ESSENTIAL HYPERTENSION: ICD-10-CM

## 2023-11-08 DIAGNOSIS — N18.31 TYPE 2 DIABETES MELLITUS WITH STAGE 3A CHRONIC KIDNEY DISEASE, WITHOUT LONG-TERM CURRENT USE OF INSULIN (MULTI): ICD-10-CM

## 2023-11-08 DIAGNOSIS — E11.22 TYPE 2 DIABETES MELLITUS WITH STAGE 3A CHRONIC KIDNEY DISEASE, WITHOUT LONG-TERM CURRENT USE OF INSULIN (MULTI): ICD-10-CM

## 2023-11-08 DIAGNOSIS — E66.01 OBESITY, MORBID, BMI 40.0-49.9 (MULTI): ICD-10-CM

## 2023-11-08 LAB
ALBUMIN SERPL BCP-MCNC: 4.2 G/DL (ref 3.4–5)
ALP SERPL-CCNC: 50 U/L (ref 33–136)
ALT SERPL W P-5'-P-CCNC: 16 U/L (ref 10–52)
ANION GAP SERPL CALC-SCNC: 13 MMOL/L (ref 10–20)
AST SERPL W P-5'-P-CCNC: 20 U/L (ref 9–39)
BILIRUB SERPL-MCNC: 0.5 MG/DL (ref 0–1.2)
BUN SERPL-MCNC: 16 MG/DL (ref 6–23)
CALCIUM SERPL-MCNC: 9.1 MG/DL (ref 8.6–10.3)
CHLORIDE SERPL-SCNC: 105 MMOL/L (ref 98–107)
CO2 SERPL-SCNC: 24 MMOL/L (ref 21–32)
CREAT SERPL-MCNC: 0.98 MG/DL (ref 0.5–1.3)
EST. AVERAGE GLUCOSE BLD GHB EST-MCNC: 183 MG/DL
GFR SERPL CREATININE-BSD FRML MDRD: 85 ML/MIN/1.73M*2
GLUCOSE SERPL-MCNC: 205 MG/DL (ref 74–99)
HBA1C MFR BLD: 8 %
POTASSIUM SERPL-SCNC: 3.7 MMOL/L (ref 3.5–5.3)
PROT SERPL-MCNC: 7 G/DL (ref 6.4–8.2)
SODIUM SERPL-SCNC: 138 MMOL/L (ref 136–145)

## 2023-11-08 PROCEDURE — 80053 COMPREHEN METABOLIC PANEL: CPT

## 2023-11-08 PROCEDURE — 36415 COLL VENOUS BLD VENIPUNCTURE: CPT

## 2023-11-08 PROCEDURE — 83036 HEMOGLOBIN GLYCOSYLATED A1C: CPT

## 2023-11-13 ENCOUNTER — APPOINTMENT (OUTPATIENT)
Dept: PRIMARY CARE | Facility: CLINIC | Age: 68
End: 2023-11-13
Payer: MEDICARE

## 2023-11-14 ENCOUNTER — PATIENT OUTREACH (OUTPATIENT)
Dept: PRIMARY CARE | Facility: CLINIC | Age: 68
End: 2023-11-14
Payer: MEDICARE

## 2023-11-15 ENCOUNTER — OFFICE VISIT (OUTPATIENT)
Dept: PRIMARY CARE | Facility: CLINIC | Age: 68
End: 2023-11-15
Payer: MEDICARE

## 2023-11-15 VITALS
SYSTOLIC BLOOD PRESSURE: 130 MMHG | HEIGHT: 65 IN | HEART RATE: 92 BPM | WEIGHT: 243 LBS | DIASTOLIC BLOOD PRESSURE: 80 MMHG | BODY MASS INDEX: 40.48 KG/M2

## 2023-11-15 DIAGNOSIS — E78.5 HYPERLIPIDEMIA, UNSPECIFIED HYPERLIPIDEMIA TYPE: ICD-10-CM

## 2023-11-15 DIAGNOSIS — G47.33 OBSTRUCTIVE SLEEP APNEA, ADULT: ICD-10-CM

## 2023-11-15 DIAGNOSIS — Z79.4 TYPE 2 DIABETES MELLITUS WITH HYPERGLYCEMIA, WITH LONG-TERM CURRENT USE OF INSULIN (MULTI): ICD-10-CM

## 2023-11-15 DIAGNOSIS — I10 BENIGN ESSENTIAL HYPERTENSION: Primary | ICD-10-CM

## 2023-11-15 DIAGNOSIS — E66.01 OBESITY, MORBID, BMI 40.0-49.9 (MULTI): ICD-10-CM

## 2023-11-15 DIAGNOSIS — Z12.5 SPECIAL SCREENING FOR MALIGNANT NEOPLASM OF PROSTATE: ICD-10-CM

## 2023-11-15 DIAGNOSIS — E11.65 TYPE 2 DIABETES MELLITUS WITH HYPERGLYCEMIA, WITH LONG-TERM CURRENT USE OF INSULIN (MULTI): ICD-10-CM

## 2023-11-15 PROCEDURE — 3061F NEG MICROALBUMINURIA REV: CPT | Performed by: INTERNAL MEDICINE

## 2023-11-15 PROCEDURE — 3079F DIAST BP 80-89 MM HG: CPT | Performed by: INTERNAL MEDICINE

## 2023-11-15 PROCEDURE — 1160F RVW MEDS BY RX/DR IN RCRD: CPT | Performed by: INTERNAL MEDICINE

## 2023-11-15 PROCEDURE — 3066F NEPHROPATHY DOC TX: CPT | Performed by: INTERNAL MEDICINE

## 2023-11-15 PROCEDURE — 3052F HG A1C>EQUAL 8.0%<EQUAL 9.0%: CPT | Performed by: INTERNAL MEDICINE

## 2023-11-15 PROCEDURE — 3075F SYST BP GE 130 - 139MM HG: CPT | Performed by: INTERNAL MEDICINE

## 2023-11-15 PROCEDURE — 1159F MED LIST DOCD IN RCRD: CPT | Performed by: INTERNAL MEDICINE

## 2023-11-15 PROCEDURE — 3008F BODY MASS INDEX DOCD: CPT | Performed by: INTERNAL MEDICINE

## 2023-11-15 PROCEDURE — 99214 OFFICE O/P EST MOD 30 MIN: CPT | Performed by: INTERNAL MEDICINE

## 2023-11-15 PROCEDURE — 1036F TOBACCO NON-USER: CPT | Performed by: INTERNAL MEDICINE

## 2023-11-15 ASSESSMENT — ENCOUNTER SYMPTOMS
CONFUSION: 0
NEUROLOGICAL NEGATIVE: 1
JOINT SWELLING: 0
BACK PAIN: 0
ENDOCRINE NEGATIVE: 1
CARDIOVASCULAR NEGATIVE: 1
NAUSEA: 0
HEMATOLOGIC/LYMPHATIC NEGATIVE: 1
WHEEZING: 0
NECK STIFFNESS: 0
GASTROINTESTINAL NEGATIVE: 1
HEADACHES: 0
DIARRHEA: 0
VOMITING: 0
MUSCULOSKELETAL NEGATIVE: 1
ABDOMINAL DISTENTION: 0
NUMBNESS: 0
SHORTNESS OF BREATH: 0
FEVER: 0
PSYCHIATRIC NEGATIVE: 1
CONSTIPATION: 0
CONSTITUTIONAL NEGATIVE: 1
LIGHT-HEADEDNESS: 0
COUGH: 0
DYSURIA: 0
ALLERGIC/IMMUNOLOGIC NEGATIVE: 1
PALPITATIONS: 0
AGITATION: 0
RESPIRATORY NEGATIVE: 1
EYE DISCHARGE: 0
EYES NEGATIVE: 1
ABDOMINAL PAIN: 0
CHILLS: 0
ADENOPATHY: 0

## 2023-11-15 NOTE — PROGRESS NOTES
Subjective   Patient ID: Av Bustos is a 68 y.o. male who presents for Follow-up (4 MO FU LAB).  HERE FOR FU WITH LAB  NO COMPLAINT        Review of Systems   Constitutional: Negative.  Negative for chills and fever.   HENT: Negative.  Negative for congestion.    Eyes: Negative.  Negative for discharge.   Respiratory: Negative.  Negative for cough, shortness of breath and wheezing.    Cardiovascular: Negative.  Negative for chest pain, palpitations and leg swelling.   Gastrointestinal: Negative.  Negative for abdominal distention, abdominal pain, constipation, diarrhea, nausea and vomiting.   Endocrine: Negative.    Genitourinary: Negative.  Negative for dysuria and urgency.   Musculoskeletal: Negative.  Negative for back pain, joint swelling and neck stiffness.   Skin: Negative.  Negative for rash.   Allergic/Immunologic: Negative.  Negative for immunocompromised state.   Neurological: Negative.  Negative for light-headedness, numbness and headaches.   Hematological: Negative.  Negative for adenopathy.   Psychiatric/Behavioral: Negative.  Negative for agitation, behavioral problems and confusion.    All other systems reviewed and are negative.      Objective   Physical Exam  Vitals reviewed.   Constitutional:       General: He is not in acute distress.     Appearance: He is obese.   HENT:      Head: Normocephalic and atraumatic.      Nose: Nose normal.   Eyes:      Conjunctiva/sclera: Conjunctivae normal.      Pupils: Pupils are equal, round, and reactive to light.   Neck:      Vascular: No carotid bruit.   Cardiovascular:      Rate and Rhythm: Normal rate and regular rhythm.      Pulses: Normal pulses.      Heart sounds:      No gallop.   Pulmonary:      Effort: Pulmonary effort is normal. No respiratory distress.      Breath sounds: Normal breath sounds. No wheezing.   Abdominal:      General: Bowel sounds are normal.      Palpations: Abdomen is soft.      Tenderness: There is no abdominal tenderness.  "  Musculoskeletal:         General: Normal range of motion.      Cervical back: Normal range of motion. No rigidity.   Lymphadenopathy:      Cervical: No cervical adenopathy.   Skin:     General: Skin is warm.      Findings: No rash.   Neurological:      General: No focal deficit present.      Mental Status: He is alert and oriented to person, place, and time.   Psychiatric:         Mood and Affect: Mood normal.         Behavior: Behavior normal.       /80 (BP Location: Left arm, Patient Position: Sitting)   Pulse 92   Ht 1.651 m (5' 5\")   Wt 110 kg (243 lb)   BMI 40.44 kg/m²    Hemoglobin A1C   Date/Time Value Ref Range Status   11/08/2023 09:42 AM 8.0 (H) see below % Final     Assessment/Plan   Problem List Items Addressed This Visit       Benign essential hypertension - Primary    Relevant Orders    Comprehensive Metabolic Panel    Type 2 diabetes mellitus (CMS/HCC)    Relevant Orders    Comprehensive Metabolic Panel    Hemoglobin A1C    Hyperlipidemia    Relevant Orders    Comprehensive Metabolic Panel    Lipid Panel    Obesity, morbid, BMI 40.0-49.9 (CMS/HCC)    Relevant Orders    Comprehensive Metabolic Panel    Obstructive sleep apnea, adult    Relevant Orders    Comprehensive Metabolic Panel     Other Visit Diagnoses       Special screening for malignant neoplasm of prostate        Relevant Orders    Prostate Specific Antigen, Screen             MONITOR BP   GOAL BP LOWER THAN 130/80  LOW SALT  EXERCISE DAILY    [address]  HGA1C GOAL LESS THAN 7      MDM    1) COMPLEXITY: MORE THAN 1 STABLE CHRONIC CONDITION ADDRESSED  2)DATA: TESTS INTERPRETED AND OR ORDERED, TOOK INDEPENDENT HISTORY OR RECORDS REVIEWED  3)RISK: MODERATE RISK DUE TO NATURE OF MEDICAL CONDITIONS/COMORBIDITY OR MEDICATIONS ORDERED OR SURGICAL OR PROCEDURE REFERRAL, .      FU 4 MO BW  LOSE WT  EXERCISE DAILY    Labs reviewed with pt    "

## 2023-12-05 ENCOUNTER — TELEPHONE (OUTPATIENT)
Dept: NEPHROLOGY | Facility: CLINIC | Age: 68
End: 2023-12-05
Payer: MEDICARE

## 2023-12-05 DIAGNOSIS — E11.9 TYPE 2 DIABETES MELLITUS WITHOUT COMPLICATIONS (MULTI): ICD-10-CM

## 2023-12-05 NOTE — PROGRESS NOTES
Pt called in to ask if Adela could take over his medications because he feels safer with her doing it rather than his PCP. If so, he needs a refill on Glipizide 10 mg, 2 tabs daily, 30 day supplies sent to [facility].

## 2023-12-06 RX ORDER — GLIPIZIDE 10 MG/1
20 TABLET, FILM COATED, EXTENDED RELEASE ORAL DAILY
Qty: 60 TABLET | Refills: 11 | Status: SHIPPED | OUTPATIENT
Start: 2023-12-06 | End: 2024-02-28 | Stop reason: DRUGHIGH

## 2023-12-07 DIAGNOSIS — N18.31 STAGE 3A CHRONIC KIDNEY DISEASE (MULTI): ICD-10-CM

## 2023-12-11 ENCOUNTER — LAB (OUTPATIENT)
Dept: LAB | Facility: LAB | Age: 68
End: 2023-12-11
Payer: MEDICARE

## 2023-12-11 DIAGNOSIS — N18.31 STAGE 3A CHRONIC KIDNEY DISEASE (MULTI): ICD-10-CM

## 2023-12-11 LAB
ANION GAP SERPL CALC-SCNC: 12 MMOL/L (ref 10–20)
APPEARANCE UR: CLEAR
BILIRUB UR STRIP.AUTO-MCNC: NEGATIVE MG/DL
BUN SERPL-MCNC: 16 MG/DL (ref 6–23)
CALCIUM SERPL-MCNC: 9.5 MG/DL (ref 8.6–10.3)
CHLORIDE SERPL-SCNC: 106 MMOL/L (ref 98–107)
CO2 SERPL-SCNC: 26 MMOL/L (ref 21–32)
COLOR UR: YELLOW
CREAT SERPL-MCNC: 1.03 MG/DL (ref 0.5–1.3)
CREAT UR-MCNC: 82 MG/DL (ref 20–370)
GFR SERPL CREATININE-BSD FRML MDRD: 80 ML/MIN/1.73M*2
GLUCOSE SERPL-MCNC: 129 MG/DL (ref 74–99)
GLUCOSE UR STRIP.AUTO-MCNC: ABNORMAL MG/DL
KETONES UR STRIP.AUTO-MCNC: NEGATIVE MG/DL
LEUKOCYTE ESTERASE UR QL STRIP.AUTO: NEGATIVE
MAGNESIUM SERPL-MCNC: 1.62 MG/DL (ref 1.6–2.4)
NITRITE UR QL STRIP.AUTO: NEGATIVE
PH UR STRIP.AUTO: 6 [PH]
POTASSIUM SERPL-SCNC: 4 MMOL/L (ref 3.5–5.3)
PROT UR STRIP.AUTO-MCNC: NEGATIVE MG/DL
PROT UR-ACNC: 17 MG/DL (ref 5–25)
PROT/CREAT UR: 0.21 MG/MG CREAT (ref 0–0.17)
RBC # UR STRIP.AUTO: NEGATIVE /UL
SODIUM SERPL-SCNC: 140 MMOL/L (ref 136–145)
SP GR UR STRIP.AUTO: 1.01
UROBILINOGEN UR STRIP.AUTO-MCNC: <2 MG/DL

## 2023-12-11 PROCEDURE — 36415 COLL VENOUS BLD VENIPUNCTURE: CPT

## 2023-12-11 PROCEDURE — 80048 BASIC METABOLIC PNL TOTAL CA: CPT

## 2023-12-11 PROCEDURE — 81003 URINALYSIS AUTO W/O SCOPE: CPT

## 2023-12-11 PROCEDURE — 84156 ASSAY OF PROTEIN URINE: CPT

## 2023-12-11 PROCEDURE — 83735 ASSAY OF MAGNESIUM: CPT

## 2023-12-11 PROCEDURE — 82570 ASSAY OF URINE CREATININE: CPT

## 2023-12-12 ENCOUNTER — OFFICE VISIT (OUTPATIENT)
Dept: NEPHROLOGY | Facility: CLINIC | Age: 68
End: 2023-12-12
Payer: MEDICARE

## 2023-12-12 VITALS
BODY MASS INDEX: 40.35 KG/M2 | WEIGHT: 242.2 LBS | SYSTOLIC BLOOD PRESSURE: 134 MMHG | HEART RATE: 79 BPM | HEIGHT: 65 IN | DIASTOLIC BLOOD PRESSURE: 68 MMHG

## 2023-12-12 DIAGNOSIS — N18.2 STAGE 2 CHRONIC KIDNEY DISEASE: ICD-10-CM

## 2023-12-12 DIAGNOSIS — I10 BENIGN ESSENTIAL HYPERTENSION: Primary | ICD-10-CM

## 2023-12-12 DIAGNOSIS — E11.65 TYPE 2 DIABETES MELLITUS WITH HYPERGLYCEMIA, WITH LONG-TERM CURRENT USE OF INSULIN (MULTI): ICD-10-CM

## 2023-12-12 DIAGNOSIS — E78.5 HYPERLIPIDEMIA, UNSPECIFIED HYPERLIPIDEMIA TYPE: ICD-10-CM

## 2023-12-12 DIAGNOSIS — Z79.4 TYPE 2 DIABETES MELLITUS WITH HYPERGLYCEMIA, WITH LONG-TERM CURRENT USE OF INSULIN (MULTI): ICD-10-CM

## 2023-12-12 PROCEDURE — 1160F RVW MEDS BY RX/DR IN RCRD: CPT | Performed by: CLINICAL NURSE SPECIALIST

## 2023-12-12 PROCEDURE — 1036F TOBACCO NON-USER: CPT | Performed by: CLINICAL NURSE SPECIALIST

## 2023-12-12 PROCEDURE — 1159F MED LIST DOCD IN RCRD: CPT | Performed by: CLINICAL NURSE SPECIALIST

## 2023-12-12 PROCEDURE — 3052F HG A1C>EQUAL 8.0%<EQUAL 9.0%: CPT | Performed by: CLINICAL NURSE SPECIALIST

## 2023-12-12 PROCEDURE — 3075F SYST BP GE 130 - 139MM HG: CPT | Performed by: CLINICAL NURSE SPECIALIST

## 2023-12-12 PROCEDURE — 99212 OFFICE O/P EST SF 10 MIN: CPT | Performed by: CLINICAL NURSE SPECIALIST

## 2023-12-12 PROCEDURE — 3008F BODY MASS INDEX DOCD: CPT | Performed by: CLINICAL NURSE SPECIALIST

## 2023-12-12 PROCEDURE — 3078F DIAST BP <80 MM HG: CPT | Performed by: CLINICAL NURSE SPECIALIST

## 2023-12-12 PROCEDURE — 3061F NEG MICROALBUMINURIA REV: CPT | Performed by: CLINICAL NURSE SPECIALIST

## 2023-12-12 PROCEDURE — 3066F NEPHROPATHY DOC TX: CPT | Performed by: CLINICAL NURSE SPECIALIST

## 2023-12-12 ASSESSMENT — ENCOUNTER SYMPTOMS
NEUROLOGICAL NEGATIVE: 1
CONSTITUTIONAL NEGATIVE: 1
MUSCULOSKELETAL NEGATIVE: 1
PSYCHIATRIC NEGATIVE: 1
CARDIOVASCULAR NEGATIVE: 1
GASTROINTESTINAL NEGATIVE: 1
RESPIRATORY NEGATIVE: 1
ENDOCRINE NEGATIVE: 1

## 2023-12-12 NOTE — ASSESSMENT & PLAN NOTE
Kidney function has returned to its baseline, creatinine is 1.03, blood pressure and diabetes is well-controlled

## 2023-12-12 NOTE — ASSESSMENT & PLAN NOTE
Blood sugar on his labs are good however he states that he has had difficulty controlling his blood sugars, he also states he is not following his diet strictly

## 2023-12-12 NOTE — PROGRESS NOTES
Subjective   Patient ID: Av Bustos is a 68 y.o. male who presents for Follow-up (3 month ck/Review labs).  Patient being seen in follow-up for chronic kidney disease with history of hypertension    Labs reviewed  Total protein creatinine ratio 0.21  Magnesium 1.62  BMP with glucose 129  Sodium 140, potassium 4.0, chloride 106, bicarb 26  Renal function with BUN of 16 and creatinine of 1.03    He is doing well  He has no complaints of difficulties voiding  His blood sugars have been running slightly higher, he follows Dr. Adame for this  His blood pressure is well-controlled  He is tolerating his medications without difficulty  He does complain of some palpitations every day, this occurs about 3 hours after he takes his metoprolol, he will follow with cardiology for this        Review of Systems   Constitutional: Negative.    Respiratory: Negative.     Cardiovascular: Negative.    Gastrointestinal: Negative.    Endocrine: Negative.    Genitourinary: Negative.    Musculoskeletal: Negative.    Skin: Negative.    Neurological: Negative.    Psychiatric/Behavioral: Negative.         Objective   Physical Exam  Vitals reviewed.   Constitutional:       Appearance: Normal appearance.   HENT:      Head: Normocephalic.   Cardiovascular:      Rate and Rhythm: Normal rate and regular rhythm.   Pulmonary:      Effort: Pulmonary effort is normal.      Breath sounds: Normal breath sounds.   Abdominal:      Palpations: Abdomen is soft.   Musculoskeletal:         General: Normal range of motion.   Skin:     General: Skin is warm and dry.   Neurological:      Mental Status: He is alert and oriented to person, place, and time.   Psychiatric:         Mood and Affect: Mood normal.         Behavior: Behavior normal.         Assessment/Plan   Problem List Items Addressed This Visit             ICD-10-CM    Benign essential hypertension - Primary I10     Blood pressure is currently well-controlled on nifedipine and metoprolol          Type 2 diabetes mellitus (CMS/[facility]) E11.9     Blood sugar on his labs are good however he states that he has had difficulty controlling his blood sugars, he also states he is not following his diet strictly         Hyperlipidemia E78.5     On rosuvastatin and Zetia         Chronic kidney disease N18.9     Kidney function has returned to its baseline, creatinine is 1.03, blood pressure and diabetes is well-controlled         Relevant Orders    Basic metabolic panel    Urinalysis with Reflex Microscopic    Albumin, urine, random    Follow Up In Nephrology   Patient can follow-up with his family physician however he did request for yearly appointment, we will see him in 1 year for follow-up secondary to chronic wound stage II with diabetes and hypertension  Hypertension  Chronic kidney disease stage II/IIIa with baseline creatinine 1.1-1.4  Diabetes mellitus type 2  Obesity         Adela Arboleda, DEVENDRA-DALLAS, DNP 12/12/23 11:29 AM

## 2023-12-27 DIAGNOSIS — R55 SYNCOPE, UNSPECIFIED SYNCOPE TYPE: ICD-10-CM

## 2024-01-01 RX ORDER — METOPROLOL TARTRATE 100 MG/1
100 TABLET ORAL 2 TIMES DAILY
Qty: 60 TABLET | Refills: 1 | Status: SHIPPED | OUTPATIENT
Start: 2024-01-01 | End: 2024-02-12 | Stop reason: SDUPTHER

## 2024-01-09 ENCOUNTER — OFFICE VISIT (OUTPATIENT)
Dept: CARDIOLOGY | Facility: CLINIC | Age: 69
End: 2024-01-09
Payer: MEDICARE

## 2024-01-09 VITALS
HEIGHT: 65 IN | WEIGHT: 247.1 LBS | DIASTOLIC BLOOD PRESSURE: 86 MMHG | SYSTOLIC BLOOD PRESSURE: 144 MMHG | BODY MASS INDEX: 41.17 KG/M2 | OXYGEN SATURATION: 97 % | HEART RATE: 82 BPM

## 2024-01-09 DIAGNOSIS — R00.0 TACHYCARDIA: Primary | ICD-10-CM

## 2024-01-09 PROCEDURE — 3066F NEPHROPATHY DOC TX: CPT | Performed by: STUDENT IN AN ORGANIZED HEALTH CARE EDUCATION/TRAINING PROGRAM

## 2024-01-09 PROCEDURE — 3079F DIAST BP 80-89 MM HG: CPT | Performed by: STUDENT IN AN ORGANIZED HEALTH CARE EDUCATION/TRAINING PROGRAM

## 2024-01-09 PROCEDURE — 1160F RVW MEDS BY RX/DR IN RCRD: CPT | Performed by: STUDENT IN AN ORGANIZED HEALTH CARE EDUCATION/TRAINING PROGRAM

## 2024-01-09 PROCEDURE — 1159F MED LIST DOCD IN RCRD: CPT | Performed by: STUDENT IN AN ORGANIZED HEALTH CARE EDUCATION/TRAINING PROGRAM

## 2024-01-09 PROCEDURE — 1036F TOBACCO NON-USER: CPT | Performed by: STUDENT IN AN ORGANIZED HEALTH CARE EDUCATION/TRAINING PROGRAM

## 2024-01-09 PROCEDURE — 99214 OFFICE O/P EST MOD 30 MIN: CPT | Performed by: STUDENT IN AN ORGANIZED HEALTH CARE EDUCATION/TRAINING PROGRAM

## 2024-01-09 PROCEDURE — 3077F SYST BP >= 140 MM HG: CPT | Performed by: STUDENT IN AN ORGANIZED HEALTH CARE EDUCATION/TRAINING PROGRAM

## 2024-01-09 PROCEDURE — 3008F BODY MASS INDEX DOCD: CPT | Performed by: STUDENT IN AN ORGANIZED HEALTH CARE EDUCATION/TRAINING PROGRAM

## 2024-01-09 RX ORDER — INSULIN ASPART INJECTION 100 [IU]/ML
INJECTION, SOLUTION SUBCUTANEOUS
COMMUNITY
Start: 2023-12-13

## 2024-01-09 RX ORDER — DILTIAZEM HYDROCHLORIDE 240 MG/1
240 CAPSULE, COATED, EXTENDED RELEASE ORAL DAILY
Qty: 30 CAPSULE | Refills: 11 | Status: SHIPPED | OUTPATIENT
Start: 2024-01-09 | End: 2024-02-12 | Stop reason: SDUPTHER

## 2024-01-09 RX ORDER — HYDRALAZINE HYDROCHLORIDE 25 MG/1
50 TABLET, FILM COATED ORAL 3 TIMES DAILY
Qty: 180 TABLET | Refills: 11 | Status: SHIPPED | OUTPATIENT
Start: 2024-01-09 | End: 2024-02-12 | Stop reason: DRUGHIGH

## 2024-01-09 NOTE — PROGRESS NOTES
Chief Complaint   Patient presents with    Hyperlipidemia     Referred by nephrology    Hypertension     Medication management     HPI:  I was requested by Dr. Rodríguez to evaluate this patient in consultation for cardiac assessment.    Mr. Av Bustos is a 67-year-old male with history of severe obesity, sleep apnea on CPAP, hypertension, diabetes mellitus type 2, possible obesity hypoventilation syndrome,  hyperlipidemia presented with syncope, sinus bradycardia, NERY, hyperkalemia, hyperglycemia, metabolic acidosis, hypomagnesemia. He states that he is still tachycardic (90bpm) even with top dose Metoprolol. He denies chest pain, shortness of breath, palpitations, leg edema, lightheadedness, headaches, fever, chills, orthopnea, paroxysmal nocturnal dyspnea or syncope.  EKG shows normal sinus rhythm with no signs of ACS.  The echocardiogram showed normal LVEF 60% with no wall motion abnormalities.  72h monitor (09/2023) showing baseline sinus rhythm, average HR 93bpm, <1% extra-beats.    Past Medical History  Past Medical History:   Diagnosis Date    Encounter for screening for malignant neoplasm of prostate     Prostate cancer screening    Essential (primary) hypertension 02/05/2018    HTN (hypertension), benign    Essential (primary) hypertension 05/07/2018    Benign essential hypertension    Essential (primary) hypertension 05/22/2018    Benign essential hypertension    Personal history of other drug therapy 09/09/2019    History of influenza vaccination    Personal history of other endocrine, nutritional and metabolic disease 02/05/2018    History of type 2 diabetes mellitus    Personal history of other endocrine, nutritional and metabolic disease 05/07/2018    History of type 2 diabetes mellitus    Personal history of other endocrine, nutritional and metabolic disease 05/22/2018    History of type 2 diabetes mellitus    Personal history of other endocrine, nutritional and metabolic disease 09/10/2018    History of  type 2 diabetes mellitus    Personal history of other endocrine, nutritional and metabolic disease 12/03/2018    History of type 2 diabetes mellitus    Personal history of other endocrine, nutritional and metabolic disease 03/20/2020    History of type 2 diabetes mellitus    Personal history of other endocrine, nutritional and metabolic disease 09/09/2019    History of type 2 diabetes mellitus    Personal history of other endocrine, nutritional and metabolic disease 06/26/2018    History of type 2 diabetes mellitus    Personal history of other endocrine, nutritional and metabolic disease 06/03/2019    History of type 2 diabetes mellitus    Personal history of other endocrine, nutritional and metabolic disease 12/15/2020    History of type 2 diabetes mellitus       Past Surgical History  Past Surgical History:   Procedure Laterality Date    COLONOSCOPY  08/10/2020    repeat 5 yrs    OTHER SURGICAL HISTORY  08/16/2021    Hip replacement       Past Family History  Family History   Problem Relation Name Age of Onset    COPD Mother      Dementia Mother      Bone cancer Father         Allergy History  No Known Allergies    Past Social History  Social History     Socioeconomic History    Marital status:      Spouse name: None    Number of children: None    Years of education: None    Highest education level: None   Occupational History    None   Tobacco Use    Smoking status: Never     Passive exposure: Never    Smokeless tobacco: Never   Vaping Use    Vaping Use: Never used   Substance and Sexual Activity    Alcohol use: Not Currently    Drug use: Never    Sexual activity: None   Other Topics Concern    None   Social History Narrative    None     Social Determinants of Health     Financial Resource Strain: Not on file   Food Insecurity: Not on file   Transportation Needs: Not on file   Physical Activity: Not on file   Stress: Not on file   Social Connections: Not on file   Intimate Partner Violence: Not on file  "  Housing Stability: Not on file       Social History     Tobacco Use   Smoking Status Never    Passive exposure: Never   Smokeless Tobacco Never       Review of Systems:  A total of 12 systems have been reviewed and are negative except for the aforementioned findings described in HPI.    Objective Data:  Last Recorded Vitals:  Vitals:    01/09/24 1516   BP: 144/86   Pulse: 82   SpO2: 97%   Weight: 112 kg (247 lb 1.6 oz)   Height: 1.651 m (5' 5\")       Last Labs:  CBC - 9/7/2023:  8:10 AM  7.6 11.9 232    37.8      CMP - 12/11/2023: 11:06 AM  9.5 7.0 20 --- 0.5   3.7 4.2 16 50      PTT - 8/21/2023:  8:29 AM  1.2   13.3 27     TROPHS   Date/Time Value Ref Range Status   08/21/2023 09:48 AM 4 0 - 20 ng/L Final     Comment:     .  Less than 99th percentile of normal range cutoff-  Female and children under 18 years old <14 ng/L; Male <21 ng/L: Negative  Repeat testing should be performed if clinically indicated.   .  Female and children under 18 years old 14-50 ng/L; Male 21-50 ng/L:  Consistent with possible cardiac damage and possible increased clinical   risk. Serial measurements may help to assess extent of myocardial damage.   .  >50 ng/L: Consistent with cardiac damage, increased clinical risk and  myocardial infarction. Serial measurements may help assess extent of   myocardial damage.   .   NOTE: Children less than 1 year old may have higher baseline troponin   levels and results should be interpreted in conjunction with the overall   clinical context.   .  NOTE: Troponin I testing is performed using a different   testing methodology at [facility] than at other   [facility]. Direct result comparisons should only   be made within the same method.     08/21/2023 08:29 AM 5 0 - 20 ng/L Final     Comment:     .  Less than 99th percentile of normal range cutoff-  Female and children under 18 years old <14 ng/L; Male <21 ng/L: Negative  Repeat testing should be performed if clinically indicated. "   .  Female and children under 18 years old 14-50 ng/L; Male 21-50 ng/L:  Consistent with possible cardiac damage and possible increased clinical   risk. Serial measurements may help to assess extent of myocardial damage.   .  >50 ng/L: Consistent with cardiac damage, increased clinical risk and  myocardial infarction. Serial measurements may help assess extent of   myocardial damage.   .   NOTE: Children less than 1 year old may have higher baseline troponin   levels and results should be interpreted in conjunction with the overall   clinical context.   .  NOTE: Troponin I testing is performed using a different   testing methodology at [facility] than at other   [facility]. Direct result comparisons should only   be made within the same method.       BNP   Date/Time Value Ref Range Status   08/21/2023 08:29 AM 54 0 - 99 pg/mL Final     Comment:     .  <100 pg/mL - Heart failure unlikely  100-299 pg/mL - Intermediate probability of acute heart  .               failure exacerbation. Correlate with clinical  .               context and patient history.    >=300 pg/mL - Heart Failure likely. Correlate with clinical  .               context and patient history.  BNP testing is performed using different testing   methodology at [facility] than at other   [facility]. Direct result comparisons should   only be made within the same method.       HGBA1C   Date/Time Value Ref Range Status   11/08/2023 09:42 AM 8.0 see below % Final   07/12/2023 10:39 AM 9.9 % Final     Comment:          Diagnosis of Diabetes-Adults   Non-Diabetic: < or = 5.6%   Increased risk for developing diabetes: 5.7-6.4%   Diagnostic of diabetes: > or = 6.5%  .       Monitoring of Diabetes                Age (y)     Therapeutic Goal (%)   Adults:          >18           <7.0   Pediatrics:    13-18           <7.5                   7-12           <8.0                   0- 6            7.5-8.5   American Diabetes  Association. Diabetes Care 33(S1), Jan 2010.   03/13/2023 10:00 AM 9.8 % Final     Comment:          Diagnosis of Diabetes-Adults   Non-Diabetic: < or = 5.6%   Increased risk for developing diabetes: 5.7-6.4%   Diagnostic of diabetes: > or = 6.5%  .       Monitoring of Diabetes                Age (y)     Therapeutic Goal (%)   Adults:          >18           <7.0   Pediatrics:    13-18           <7.5                   7-12           <8.0                   0- 6            7.5-8.5   American Diabetes Association. Diabetes Care 33(S1), Jan 2010.     02/23/2021 11:48 AM 8.1 4.0 - 5.6 % Final     VLDL   Date/Time Value Ref Range Status   11/11/2022 09:38 AM 52 0 - 40 mg/dL Final   09/22/2020 10:24 AM 44 0 - 40 mg/dL Final   06/26/2018 12:00 AM 33 0 - 40 mg/dL Final        Patient Medications:  Outpatient Encounter Medications as of 1/9/2024   Medication Sig Dispense Refill    ezetimibe (Zetia) 10 mg tablet Take 1 tablet (10 mg) by mouth once daily.      glipiZIDE XL (Glucotrol XL) 10 mg 24 hr tablet Take 2 tablets (20 mg) by mouth once daily. 60 tablet 11    insulin aspart, with niacinamide, (Fiasp FlexTouch U-100 Insulin) 100 unit/mL (3 mL) injection 3 times a day with meals. Sliding scale      insulin glargine (Basaglar KwikPen U-100 Insulin) 100 unit/mL (3 mL) pen Inject 35 Units under the skin once daily in the morning. Take as directed per insulin instructions. 3 mL 12    NIFEdipine XL 60 mg 24 hr tablet Take 1 tablet (60 mg) by mouth once daily.      OneTouch Delica Plus Lancet 33 gauge misc CHECK BLOOD SUGARS 3 TIMES A DAY      OneTouch Ultra Test strip CHECK BLOOD SUGAR THREE TIMES DAILY      rosuvastatin (Crestor) 5 mg tablet once daily at bedtime.      metFORMIN (Glucophage) 500 mg tablet Take 2 tablets (1,000 mg) by mouth 2 times a day with meals. 360 tablet 3    metoprolol tartrate (Lopressor) 100 mg tablet Take 1 tablet (100 mg) by mouth 2 times a day. 60 tablet 1    NovoLOG FlexPen U-100 Insulin 100 unit/mL  (3 mL) pen 3 times a day with meals. SLIDING SCALE       No facility-administered encounter medications on file as of 1/9/2024.       Physical Exam:  General: alert, oriented and in no acute distress  HEENT: NC/AT; EOMI; PERRLA, external ear is normal  Neck: supple; trachea midline; no masses; no JVD  Chest: clear breath sounds bilaterally; no wheezing  Cardio: regular rhythm, S1S2 normal, no murmurs  Abdomen: Soft, non-tender, non-distension, no organomegaly  Extremities: no clubbing/cyanosis/edema  Neuro: Grossly intact     Psychiatric: Normal mood and affect    Past Cardiology Results (Last 3 Years):  EKG:  No results found for this or any previous visit from the past 1095 days.    Echo:  Echo Results:  No results found for this or any previous visit from the past 365 days.     Cath:  No results found for this or any previous visit from the past 1095 days.    CV NCDR CATHPCI V5 COLLECTION FORM   Stress Test:  No results found for this or any previous visit from the past 1095 days.    Cardiac Imaging:  No results found for this or any previous visit from the past 1095 days.       Assessment/Plan     Mr. Av Bustos is a 67-year-old male with history of severe obesity, sleep apnea on CPAP, hypertension, diabetes mellitus type 2, possible obesity hypoventilation syndrome,  hyperlipidemia presented with syncope, sinus bradycardia, NERY, hyperkalemia, hyperglycemia, metabolic acidosis, hypomagnesemia. He states that he is still tachycardic (90bpm) even with top dose Metoprolol. He denies chest pain, shortness of breath, palpitations, leg edema, lightheadedness, headaches, fever, chills, orthopnea, paroxysmal nocturnal dyspnea or syncope.  EKG shows normal sinus rhythm with no signs of ACS.  The echocardiogram showed normal LVEF 60% with no wall motion abnormalities.  72h monitor (09/2023) showing baseline sinus rhythm, average HR 93bpm, <1% extra-beats.     Assessment     # Sinus tachycardia  - Echo showing LVEF 60%  -  Resting heart rate of 110 bpm; patient remains asymptomatic, today improvement in HR 85bpm  - Would suggest to keep Metoprolol tartrate to 100mg BID  - Follow up in Cardiology clinic with Dr. George.  - EKG shows normal sinus tachycardia rhythm with no signs of ACS.  - The echocardiogram showed normal LVEF 60% with no wall motion abnormalities.  - 72h monitor (09/2023) showing baseline sinus rhythm, average HR 93bpm, <1% extra-beats.  - Keep Metoprolol tartrate 100mg q12h.  -  We will start Cardizem CR 240mg daily and Hydralazine 50mg q8h.  - Stop Nifedipine  - Follow up in 2 months.    # Hypertension  - Elevated BP  - Keep Metoprolol tartrate 100mg q12h.  -  We will start Cardizem CR 240mg daily and Hydralazine 50mg q8h.  - Stop Nifedipine  - Follow up in 2 months.     # T2DM  - Keep insulin Lispro, Glipizide  - ISS  - Patient had episode of hypoglycemia after 80u insulin; would suggest to adjust it accordingly.     # Hyperlipidemia  - Keep atorvastatin 40mg daily, Ezetimibe 10mg daily     # BATSHEVA on CPAP  - Keep CPAP     This note was transcribed using the Dragon Dictation system. There may be grammatical, punctuation, or verbiage errors that occur with voice recognition programs.    Counseling greater than 50% of visit regarding all cardiac issues.    Thank you, Dr. Rodríguez, for allowing me to participate in the care of this patient. Please do not hesitate to contact me with any further questions or concerns.    Nikunj Bettencourt MD  Cardiology

## 2024-02-12 ENCOUNTER — OFFICE VISIT (OUTPATIENT)
Dept: CARDIOLOGY | Facility: CLINIC | Age: 69
End: 2024-02-12
Payer: MEDICARE

## 2024-02-12 VITALS
HEART RATE: 71 BPM | OXYGEN SATURATION: 95 % | HEIGHT: 65 IN | DIASTOLIC BLOOD PRESSURE: 80 MMHG | BODY MASS INDEX: 41.72 KG/M2 | SYSTOLIC BLOOD PRESSURE: 174 MMHG | WEIGHT: 250.4 LBS

## 2024-02-12 DIAGNOSIS — R00.0 TACHYCARDIA: ICD-10-CM

## 2024-02-12 DIAGNOSIS — R55 SYNCOPE, UNSPECIFIED SYNCOPE TYPE: ICD-10-CM

## 2024-02-12 DIAGNOSIS — I1A.0 RESISTANT HYPERTENSION: Primary | ICD-10-CM

## 2024-02-12 DIAGNOSIS — E11.22 TYPE 2 DIABETES MELLITUS WITH STAGE 3A CHRONIC KIDNEY DISEASE, WITHOUT LONG-TERM CURRENT USE OF INSULIN (MULTI): ICD-10-CM

## 2024-02-12 DIAGNOSIS — N18.31 TYPE 2 DIABETES MELLITUS WITH STAGE 3A CHRONIC KIDNEY DISEASE, WITHOUT LONG-TERM CURRENT USE OF INSULIN (MULTI): ICD-10-CM

## 2024-02-12 PROCEDURE — 3008F BODY MASS INDEX DOCD: CPT | Performed by: STUDENT IN AN ORGANIZED HEALTH CARE EDUCATION/TRAINING PROGRAM

## 2024-02-12 PROCEDURE — 3077F SYST BP >= 140 MM HG: CPT | Performed by: STUDENT IN AN ORGANIZED HEALTH CARE EDUCATION/TRAINING PROGRAM

## 2024-02-12 PROCEDURE — 99214 OFFICE O/P EST MOD 30 MIN: CPT | Performed by: STUDENT IN AN ORGANIZED HEALTH CARE EDUCATION/TRAINING PROGRAM

## 2024-02-12 PROCEDURE — 1159F MED LIST DOCD IN RCRD: CPT | Performed by: STUDENT IN AN ORGANIZED HEALTH CARE EDUCATION/TRAINING PROGRAM

## 2024-02-12 PROCEDURE — 1036F TOBACCO NON-USER: CPT | Performed by: STUDENT IN AN ORGANIZED HEALTH CARE EDUCATION/TRAINING PROGRAM

## 2024-02-12 PROCEDURE — 3079F DIAST BP 80-89 MM HG: CPT | Performed by: STUDENT IN AN ORGANIZED HEALTH CARE EDUCATION/TRAINING PROGRAM

## 2024-02-12 PROCEDURE — 1160F RVW MEDS BY RX/DR IN RCRD: CPT | Performed by: STUDENT IN AN ORGANIZED HEALTH CARE EDUCATION/TRAINING PROGRAM

## 2024-02-12 RX ORDER — DILTIAZEM HYDROCHLORIDE 240 MG/1
240 CAPSULE, COATED, EXTENDED RELEASE ORAL DAILY
Qty: 30 CAPSULE | Refills: 11 | Status: SHIPPED | OUTPATIENT
Start: 2024-02-12 | End: 2024-04-30 | Stop reason: SDUPTHER

## 2024-02-12 RX ORDER — METOPROLOL TARTRATE 100 MG/1
100 TABLET ORAL 2 TIMES DAILY
Qty: 60 TABLET | Refills: 1 | Status: SHIPPED | OUTPATIENT
Start: 2024-02-12 | End: 2024-03-18 | Stop reason: SDUPTHER

## 2024-02-12 RX ORDER — METFORMIN HYDROCHLORIDE 500 MG/1
1000 TABLET ORAL
Qty: 360 TABLET | Refills: 3 | Status: SHIPPED | OUTPATIENT
Start: 2024-02-12 | End: 2025-02-11

## 2024-02-12 RX ORDER — HYDRALAZINE HYDROCHLORIDE 25 MG/1
50 TABLET, FILM COATED ORAL 3 TIMES DAILY
Qty: 180 TABLET | Refills: 11 | Status: SHIPPED | OUTPATIENT
Start: 2024-02-12 | End: 2024-02-12 | Stop reason: SDUPTHER

## 2024-02-12 RX ORDER — HYDRALAZINE HYDROCHLORIDE 25 MG/1
50 TABLET, FILM COATED ORAL 3 TIMES DAILY
Qty: 180 TABLET | Refills: 11 | Status: SHIPPED | OUTPATIENT
Start: 2024-02-12 | End: 2024-03-28 | Stop reason: DRUGHIGH

## 2024-02-12 NOTE — PROGRESS NOTES
Chief Complaint   Patient presents with    1 month f/u     HPI:  I was requested by Dr. Rodríguez to evaluate this patient in consultation for cardiac assessment.     Mr. Av Bustos is a 68-year-old male with history of severe obesity, sleep apnea on CPAP, hypertension, diabetes mellitus type 2, possible obesity hypoventilation syndrome,  hyperlipidemia presented with syncope, sinus bradycardia, NERY, hyperkalemia, hyperglycemia, metabolic acidosis, hypomagnesemia. He states that he is still tachycardic (90bpm) even with top dose Metoprolol. He denies chest pain, shortness of breath, palpitations, leg edema, lightheadedness, headaches, fever, chills, orthopnea, paroxysmal nocturnal dyspnea or syncope.  EKG shows normal sinus rhythm with no signs of ACS.  The echocardiogram showed normal LVEF 60% with no wall motion abnormalities.  72h monitor (09/2023) showing baseline sinus rhythm, average HR 93bpm, <1% extra-beats.    Patient returns today feeling fine, still complains of elevated blood pressure. Notably, he reports that his BP has always been very high from his 20s, and sometimes associated with headache. He is compliant with the medication.      Past Medical History  Past Medical History:   Diagnosis Date    Encounter for screening for malignant neoplasm of prostate     Prostate cancer screening    Essential (primary) hypertension 02/05/2018    HTN (hypertension), benign    Essential (primary) hypertension 05/07/2018    Benign essential hypertension    Essential (primary) hypertension 05/22/2018    Benign essential hypertension    Personal history of other drug therapy 09/09/2019    History of influenza vaccination    Personal history of other endocrine, nutritional and metabolic disease 02/05/2018    History of type 2 diabetes mellitus    Personal history of other endocrine, nutritional and metabolic disease 05/07/2018    History of type 2 diabetes mellitus    Personal history of other endocrine, nutritional and  metabolic disease 05/22/2018    History of type 2 diabetes mellitus    Personal history of other endocrine, nutritional and metabolic disease 09/10/2018    History of type 2 diabetes mellitus    Personal history of other endocrine, nutritional and metabolic disease 12/03/2018    History of type 2 diabetes mellitus    Personal history of other endocrine, nutritional and metabolic disease 03/20/2020    History of type 2 diabetes mellitus    Personal history of other endocrine, nutritional and metabolic disease 09/09/2019    History of type 2 diabetes mellitus    Personal history of other endocrine, nutritional and metabolic disease 06/26/2018    History of type 2 diabetes mellitus    Personal history of other endocrine, nutritional and metabolic disease 06/03/2019    History of type 2 diabetes mellitus    Personal history of other endocrine, nutritional and metabolic disease 12/15/2020    History of type 2 diabetes mellitus       Past Surgical History  Past Surgical History:   Procedure Laterality Date    COLONOSCOPY  08/10/2020    repeat 5 yrs    OTHER SURGICAL HISTORY  08/16/2021    Hip replacement       Past Family History  Family History   Problem Relation Name Age of Onset    COPD Mother      Dementia Mother      Bone cancer Father         Allergy History  No Known Allergies    Past Social History  Social History     Socioeconomic History    Marital status:      Spouse name: None    Number of children: None    Years of education: None    Highest education level: None   Occupational History    None   Tobacco Use    Smoking status: Never     Passive exposure: Never    Smokeless tobacco: Never   Vaping Use    Vaping Use: Never used   Substance and Sexual Activity    Alcohol use: Not Currently    Drug use: Never    Sexual activity: None   Other Topics Concern    None   Social History Narrative    None     Social Determinants of Health     Financial Resource Strain: Not on file   Food Insecurity: Not on file  "  Transportation Needs: Not on file   Physical Activity: Not on file   Stress: Not on file   Social Connections: Not on file   Intimate Partner Violence: Not on file   Housing Stability: Not on file       Social History     Tobacco Use   Smoking Status Never    Passive exposure: Never   Smokeless Tobacco Never       Objective Data:  Last Recorded Vitals:  Vitals:    02/12/24 1303   BP: 174/80   Pulse: 71   SpO2: 95%   Weight: 114 kg (250 lb 6.4 oz)   Height: 1.651 m (5' 5\")       Last Labs:  CBC - 9/7/2023:  8:10 AM  7.6 11.9 232    37.8      CMP - 12/11/2023: 11:06 AM  9.5 7.0 20 --- 0.5   3.7 4.2 16 50      PTT - 8/21/2023:  8:29 AM  1.2   13.3 27     TROPHS   Date/Time Value Ref Range Status   08/21/2023 09:48 AM 4 0 - 20 ng/L Final     Comment:     .  Less than 99th percentile of normal range cutoff-  Female and children under 18 years old <14 ng/L; Male <21 ng/L: Negative  Repeat testing should be performed if clinically indicated.   .  Female and children under 18 years old 14-50 ng/L; Male 21-50 ng/L:  Consistent with possible cardiac damage and possible increased clinical   risk. Serial measurements may help to assess extent of myocardial damage.   .  >50 ng/L: Consistent with cardiac damage, increased clinical risk and  myocardial infarction. Serial measurements may help assess extent of   myocardial damage.   .   NOTE: Children less than 1 year old may have higher baseline troponin   levels and results should be interpreted in conjunction with the overall   clinical context.   .  NOTE: Troponin I testing is performed using a different   testing methodology at [facility] than at other   [facility]. Direct result comparisons should only   be made within the same method.     08/21/2023 08:29 AM 5 0 - 20 ng/L Final     Comment:     .  Less than 99th percentile of normal range cutoff-  Female and children under 18 years old <14 ng/L; Male <21 ng/L: Negative  Repeat testing should be " performed if clinically indicated.   .  Female and children under 18 years old 14-50 ng/L; Male 21-50 ng/L:  Consistent with possible cardiac damage and possible increased clinical   risk. Serial measurements may help to assess extent of myocardial damage.   .  >50 ng/L: Consistent with cardiac damage, increased clinical risk and  myocardial infarction. Serial measurements may help assess extent of   myocardial damage.   .   NOTE: Children less than 1 year old may have higher baseline troponin   levels and results should be interpreted in conjunction with the overall   clinical context.   .  NOTE: Troponin I testing is performed using a different   testing methodology at [facility] than at other   [facility]. Direct result comparisons should only   be made within the same method.       BNP   Date/Time Value Ref Range Status   08/21/2023 08:29 AM 54 0 - 99 pg/mL Final     Comment:     .  <100 pg/mL - Heart failure unlikely  100-299 pg/mL - Intermediate probability of acute heart  .               failure exacerbation. Correlate with clinical  .               context and patient history.    >=300 pg/mL - Heart Failure likely. Correlate with clinical  .               context and patient history.  BNP testing is performed using different testing   methodology at [facility] than at other   [facility]. Direct result comparisons should   only be made within the same method.       HGBA1C   Date/Time Value Ref Range Status   11/08/2023 09:42 AM 8.0 see below % Final   07/12/2023 10:39 AM 9.9 % Final     Comment:          Diagnosis of Diabetes-Adults   Non-Diabetic: < or = 5.6%   Increased risk for developing diabetes: 5.7-6.4%   Diagnostic of diabetes: > or = 6.5%  .       Monitoring of Diabetes                Age (y)     Therapeutic Goal (%)   Adults:          >18           <7.0   Pediatrics:    13-18           <7.5                   7-12           <8.0                   0- 6             7.5-8.5   American Diabetes Association. Diabetes Care 33(S1), Jan 2010.   03/13/2023 10:00 AM 9.8 % Final     Comment:          Diagnosis of Diabetes-Adults   Non-Diabetic: < or = 5.6%   Increased risk for developing diabetes: 5.7-6.4%   Diagnostic of diabetes: > or = 6.5%  .       Monitoring of Diabetes                Age (y)     Therapeutic Goal (%)   Adults:          >18           <7.0   Pediatrics:    13-18           <7.5                   7-12           <8.0                   0- 6            7.5-8.5   American Diabetes Association. Diabetes Care 33(S1), Jan 2010.     02/23/2021 11:48 AM 8.1 4.0 - 5.6 % Final     VLDL   Date/Time Value Ref Range Status   11/11/2022 09:38 AM 52 0 - 40 mg/dL Final   09/22/2020 10:24 AM 44 0 - 40 mg/dL Final   06/26/2018 12:00 AM 33 0 - 40 mg/dL Final        Patient Medications:  Outpatient Encounter Medications as of 2/12/2024   Medication Sig Dispense Refill    dilTIAZem CD (Cardizem CD) 240 mg 24 hr capsule Take 1 capsule (240 mg) by mouth once daily. 30 capsule 11    ezetimibe (Zetia) 10 mg tablet Take 1 tablet (10 mg) by mouth once daily.      glipiZIDE XL (Glucotrol XL) 10 mg 24 hr tablet Take 2 tablets (20 mg) by mouth once daily. (Patient taking differently: Take 1 tablet (10 mg) by mouth once daily.) 60 tablet 11    hydrALAZINE (Apresoline) 25 mg tablet Take 2 tablets (50 mg) by mouth 3 times a day. 180 tablet 11    insulin aspart, with niacinamide, (Fiasp FlexTouch U-100 Insulin) 100 unit/mL (3 mL) injection 3 times a day with meals. Sliding scale      insulin glargine (Basaglar KwikPen U-100 Insulin) 100 unit/mL (3 mL) pen Inject 35 Units under the skin once daily in the morning. Take as directed per insulin instructions. 3 mL 12    metFORMIN (Glucophage) 500 mg tablet Take 2 tablets (1,000 mg) by mouth 2 times a day with meals. 360 tablet 3    metoprolol tartrate (Lopressor) 100 mg tablet Take 1 tablet (100 mg) by mouth 2 times a day. 60 tablet 1    OneTouch Delica  Plus Lancet 33 gauge misc CHECK BLOOD SUGARS 3 TIMES A DAY      OneTouch Ultra Test strip CHECK BLOOD SUGAR THREE TIMES DAILY      rosuvastatin (Crestor) 5 mg tablet once daily at bedtime.       No facility-administered encounter medications on file as of 2/12/2024.       Physical Exam:  General: alert, oriented and in no acute distress. Obesity  HEENT: NC/AT; EOMI; PERRLA, external ear is normal  Neck: supple; trachea midline; no masses; no JVD  Chest: clear breath sounds bilaterally; no wheezing  Cardio: regular rhythm, S1S2 normal, no murmurs  Abdomen: Soft, non-tender, non-distension, no organomegaly  Extremities: no clubbing/cyanosis/edema  Neuro: Grossly intact     Psychiatric: Normal mood and affect     Past Cardiology Results (Last 3 Years):  EKG:  No results found for this or any previous visit from the past 1095 days.    Echo:  Echo Results:  No results found for this or any previous visit from the past 365 days.     Cath:  No results found for this or any previous visit from the past 1095 days.    CV NCDR CATHPCI V5 COLLECTION FORM   Stress Test:  No results found for this or any previous visit from the past 1095 days.    Cardiac Imaging:  No results found for this or any previous visit from the past 1095 days.       Assessment/Plan     Mr. Av Bustos is a 68-year-old male with history of severe obesity, sleep apnea on CPAP, hypertension, diabetes mellitus type 2, possible obesity hypoventilation syndrome,  hyperlipidemia presented with syncope, sinus bradycardia, NERY, hyperkalemia, hyperglycemia, metabolic acidosis, hypomagnesemia. He states that he is still tachycardic (90bpm) even with top dose Metoprolol. He denies chest pain, shortness of breath, palpitations, leg edema, lightheadedness, headaches, fever, chills, orthopnea, paroxysmal nocturnal dyspnea or syncope.  EKG shows normal sinus rhythm with no signs of ACS.  The echocardiogram showed normal LVEF 60% with no wall motion abnormalities.  72h  monitor (09/2023) showing baseline sinus rhythm, average HR 93bpm, <1% extra-beats.     Assessment     # Hypertension  - Patient returns today feeling fine, still complains of elevated blood pressure. Notably, he reports that his BP has always been very high from his 20s, and sometimes associated with headache. He is compliant with the medication.  - Elevated BP in both arms - R arm 140/80mmHg, L arm 150/90mmHg.  - Keep Metoprolol tartrate 100mg q12h.  -  Keep Cardizem CR 240mg daily and Hydralazine 50mg q8h.  - Keep Hydralazine 50mg q8h  - Will start Clonidine 0.1mg q8h.  - Follow up in 2 months to titrate Hydralazine and Clonidine.    # Sinus tachycardia  - Echo showing LVEF 60%  - Resting heart rate of 110 bpm; patient remains asymptomatic, today improvement in HR 85bpm  - Would suggest to keep Metoprolol tartrate to 100mg BID  - Follow up in Cardiology clinic with Dr. George.  - EKG shows normal sinus tachycardia rhythm with no signs of ACS.  - The echocardiogram showed normal LVEF 60% with no wall motion abnormalities.  - 72h monitor (09/2023) showing baseline sinus rhythm, average HR 93bpm, <1% extra-beats.  - Keep Metoprolol tartrate 100mg q12h.     # T2DM  - Keep insulin Lispro, Glipizide  - ISS  - Patient had episode of hypoglycemia after 80u insulin; would suggest to adjust it accordingly.     # Hyperlipidemia  - Keep atorvastatin 40mg daily, Ezetimibe 10mg daily     # BATSHEVA on CPAP  - Keep CPAP     This note was transcribed using the Dragon Dictation system. There may be grammatical, punctuation, or verbiage errors that occur with voice recognition programs.     Counseling greater than 50% of visit regarding all cardiac issues.     Thank you, Dr. Rodríguez, for allowing me to participate in the care of this patient. Please do not hesitate to contact me with any further questions or concerns.     Nikunj Bettencourt MD  Cardiology    This note was transcribed using the Dragon Dictation system. There may  be grammatical, punctuation, or verbiage errors that occur with voice recognition programs.    Counseling greater than 50% of visit regarding all cardiac issues.    Thank you, Dr. Rodríguez, for allowing me to participate in the care of this patient. Please do not hesitate to contact me with any further questions or concerns.    Nikunj Bettencourt MD  Cardiology

## 2024-02-14 DIAGNOSIS — I1A.0 RESISTANT HYPERTENSION: ICD-10-CM

## 2024-02-14 RX ORDER — CLONIDINE HYDROCHLORIDE 0.1 MG/1
0.1 TABLET ORAL EVERY 8 HOURS
Qty: 90 TABLET | Refills: 1 | Status: SHIPPED | OUTPATIENT
Start: 2024-02-14 | End: 2024-04-01 | Stop reason: DRUGHIGH

## 2024-02-26 ENCOUNTER — LAB (OUTPATIENT)
Dept: LAB | Facility: LAB | Age: 69
End: 2024-02-26
Payer: MEDICARE

## 2024-02-26 DIAGNOSIS — Z79.899 OTHER LONG TERM (CURRENT) DRUG THERAPY: ICD-10-CM

## 2024-02-26 DIAGNOSIS — I10 ESSENTIAL (PRIMARY) HYPERTENSION: ICD-10-CM

## 2024-02-26 DIAGNOSIS — E78.2 MIXED HYPERLIPIDEMIA: ICD-10-CM

## 2024-02-26 DIAGNOSIS — E11.65 TYPE 2 DIABETES MELLITUS WITH HYPERGLYCEMIA (MULTI): Primary | ICD-10-CM

## 2024-02-26 LAB
ANION GAP SERPL CALC-SCNC: 14 MMOL/L (ref 10–20)
BUN SERPL-MCNC: 13 MG/DL (ref 6–23)
CALCIUM SERPL-MCNC: 9.1 MG/DL (ref 8.6–10.3)
CHLORIDE SERPL-SCNC: 105 MMOL/L (ref 98–107)
CO2 SERPL-SCNC: 24 MMOL/L (ref 21–32)
CREAT SERPL-MCNC: 0.99 MG/DL (ref 0.5–1.3)
EGFRCR SERPLBLD CKD-EPI 2021: 83 ML/MIN/1.73M*2
EST. AVERAGE GLUCOSE BLD GHB EST-MCNC: 177 MG/DL
GLUCOSE SERPL-MCNC: 226 MG/DL (ref 74–99)
HBA1C MFR BLD: 7.8 %
POTASSIUM SERPL-SCNC: 3.9 MMOL/L (ref 3.5–5.3)
SODIUM SERPL-SCNC: 139 MMOL/L (ref 136–145)

## 2024-02-26 PROCEDURE — 36415 COLL VENOUS BLD VENIPUNCTURE: CPT

## 2024-02-26 PROCEDURE — 83036 HEMOGLOBIN GLYCOSYLATED A1C: CPT

## 2024-02-26 PROCEDURE — 80048 BASIC METABOLIC PNL TOTAL CA: CPT

## 2024-02-28 ENCOUNTER — OFFICE VISIT (OUTPATIENT)
Dept: PRIMARY CARE | Facility: CLINIC | Age: 69
End: 2024-02-28
Payer: MEDICARE

## 2024-02-28 VITALS
DIASTOLIC BLOOD PRESSURE: 100 MMHG | WEIGHT: 250.2 LBS | SYSTOLIC BLOOD PRESSURE: 140 MMHG | HEIGHT: 65 IN | HEART RATE: 72 BPM | BODY MASS INDEX: 41.69 KG/M2 | OXYGEN SATURATION: 96 %

## 2024-02-28 DIAGNOSIS — E66.01 OBESITY, MORBID, BMI 40.0-49.9 (MULTI): ICD-10-CM

## 2024-02-28 DIAGNOSIS — E66.01 MORBID OBESITY (MULTI): ICD-10-CM

## 2024-02-28 DIAGNOSIS — I10 BENIGN ESSENTIAL HYPERTENSION: Primary | ICD-10-CM

## 2024-02-28 DIAGNOSIS — F41.1 GENERALIZED ANXIETY DISORDER: ICD-10-CM

## 2024-02-28 DIAGNOSIS — E11.9 TYPE 2 DIABETES MELLITUS WITHOUT COMPLICATIONS (MULTI): ICD-10-CM

## 2024-02-28 DIAGNOSIS — N18.31 STAGE 3A CHRONIC KIDNEY DISEASE (MULTI): ICD-10-CM

## 2024-02-28 DIAGNOSIS — Z12.5 SCREENING FOR PROSTATE CANCER: ICD-10-CM

## 2024-02-28 DIAGNOSIS — E11.9 CONTROLLED TYPE 2 DIABETES MELLITUS WITHOUT COMPLICATION, WITH LONG-TERM CURRENT USE OF INSULIN (MULTI): ICD-10-CM

## 2024-02-28 DIAGNOSIS — M19.90 ARTHRITIS: ICD-10-CM

## 2024-02-28 DIAGNOSIS — G47.33 OBSTRUCTIVE SLEEP APNEA, ADULT: ICD-10-CM

## 2024-02-28 DIAGNOSIS — E11.22 TYPE 2 DIABETES MELLITUS WITH STAGE 3A CHRONIC KIDNEY DISEASE, WITHOUT LONG-TERM CURRENT USE OF INSULIN (MULTI): ICD-10-CM

## 2024-02-28 DIAGNOSIS — N18.31 TYPE 2 DIABETES MELLITUS WITH STAGE 3A CHRONIC KIDNEY DISEASE, WITHOUT LONG-TERM CURRENT USE OF INSULIN (MULTI): ICD-10-CM

## 2024-02-28 DIAGNOSIS — Z79.4 CONTROLLED TYPE 2 DIABETES MELLITUS WITHOUT COMPLICATION, WITH LONG-TERM CURRENT USE OF INSULIN (MULTI): ICD-10-CM

## 2024-02-28 DIAGNOSIS — E78.5 DYSLIPIDEMIA: ICD-10-CM

## 2024-02-28 DIAGNOSIS — M1A.00X0 IDIOPATHIC CHRONIC GOUT WITHOUT TOPHUS, UNSPECIFIED SITE: ICD-10-CM

## 2024-02-28 PROBLEM — H61.20 CERUMEN IMPACTION: Status: RESOLVED | Noted: 2023-02-02 | Resolved: 2024-02-28

## 2024-02-28 PROBLEM — R06.83 SNORING: Status: RESOLVED | Noted: 2023-02-02 | Resolved: 2024-02-28

## 2024-02-28 PROBLEM — E87.5 HYPERKALEMIA: Status: RESOLVED | Noted: 2023-09-06 | Resolved: 2024-02-28

## 2024-02-28 PROBLEM — R51.9 FREQUENT HEADACHES: Status: RESOLVED | Noted: 2023-02-02 | Resolved: 2024-02-28

## 2024-02-28 PROBLEM — M79.10 GENERALIZED MUSCLE ACHE: Status: RESOLVED | Noted: 2023-02-02 | Resolved: 2024-02-28

## 2024-02-28 PROBLEM — R19.5 POSITIVE COLORECTAL CANCER SCREENING USING COLOGUARD TEST: Status: RESOLVED | Noted: 2023-02-02 | Resolved: 2024-02-28

## 2024-02-28 PROBLEM — J32.9 SINUS INFECTION: Status: RESOLVED | Noted: 2023-02-02 | Resolved: 2024-02-28

## 2024-02-28 PROBLEM — E78.9 LIPID DISORDER: Status: RESOLVED | Noted: 2023-02-02 | Resolved: 2024-02-28

## 2024-02-28 PROBLEM — H66.90 OTITIS MEDIA: Status: RESOLVED | Noted: 2023-02-02 | Resolved: 2024-02-28

## 2024-02-28 PROBLEM — M54.50 LOW BACK PAIN: Status: RESOLVED | Noted: 2023-02-02 | Resolved: 2024-02-28

## 2024-02-28 PROCEDURE — 20610 DRAIN/INJ JOINT/BURSA W/O US: CPT | Performed by: FAMILY MEDICINE

## 2024-02-28 PROCEDURE — 1160F RVW MEDS BY RX/DR IN RCRD: CPT | Performed by: FAMILY MEDICINE

## 2024-02-28 PROCEDURE — 1159F MED LIST DOCD IN RCRD: CPT | Performed by: FAMILY MEDICINE

## 2024-02-28 PROCEDURE — 3077F SYST BP >= 140 MM HG: CPT | Performed by: FAMILY MEDICINE

## 2024-02-28 PROCEDURE — 3008F BODY MASS INDEX DOCD: CPT | Performed by: FAMILY MEDICINE

## 2024-02-28 PROCEDURE — 99214 OFFICE O/P EST MOD 30 MIN: CPT | Performed by: FAMILY MEDICINE

## 2024-02-28 PROCEDURE — 3051F HG A1C>EQUAL 7.0%<8.0%: CPT | Performed by: FAMILY MEDICINE

## 2024-02-28 PROCEDURE — 1036F TOBACCO NON-USER: CPT | Performed by: FAMILY MEDICINE

## 2024-02-28 PROCEDURE — 3080F DIAST BP >= 90 MM HG: CPT | Performed by: FAMILY MEDICINE

## 2024-02-28 RX ORDER — INSULIN GLARGINE 100 [IU]/ML
45 INJECTION, SOLUTION SUBCUTANEOUS 2 TIMES DAILY
Qty: 81 ML | Refills: 3 | Status: SHIPPED | OUTPATIENT
Start: 2024-02-28 | End: 2025-02-27

## 2024-02-28 RX ORDER — TRIAMCINOLONE ACETONIDE 40 MG/ML
40 INJECTION, SUSPENSION INTRA-ARTICULAR; INTRAMUSCULAR
Status: COMPLETED | OUTPATIENT
Start: 2024-02-28 | End: 2024-02-28

## 2024-02-28 RX ORDER — SEMAGLUTIDE 0.68 MG/ML
0.25 INJECTION, SOLUTION SUBCUTANEOUS
COMMUNITY
Start: 2024-02-13

## 2024-02-28 RX ORDER — EZETIMIBE 10 MG/1
10 TABLET ORAL DAILY
Qty: 90 TABLET | Refills: 3 | Status: SHIPPED | OUTPATIENT
Start: 2024-02-28 | End: 2025-02-27

## 2024-02-28 RX ORDER — GLIPIZIDE 5 MG/1
5 TABLET, FILM COATED, EXTENDED RELEASE ORAL DAILY
Qty: 90 TABLET | Refills: 3 | Status: SHIPPED | OUTPATIENT
Start: 2024-02-28 | End: 2025-02-27

## 2024-02-28 RX ADMIN — TRIAMCINOLONE ACETONIDE 40 MG: 40 INJECTION, SUSPENSION INTRA-ARTICULAR; INTRAMUSCULAR at 12:32

## 2024-02-28 ASSESSMENT — ENCOUNTER SYMPTOMS
DIZZINESS: 0
SLEEP DISTURBANCE: 0
ARTHRALGIAS: 1
UNEXPECTED WEIGHT CHANGE: 0
HEADACHES: 0
NUMBNESS: 0
FATIGUE: 0
ACTIVITY CHANGE: 0
DYSPHORIC MOOD: 0
LIGHT-HEADEDNESS: 0
RHINORRHEA: 0
CONSTIPATION: 0
NERVOUS/ANXIOUS: 0
NAUSEA: 0
DIFFICULTY URINATING: 0
VOMITING: 0
ADENOPATHY: 0
COUGH: 0
ABDOMINAL PAIN: 0
WHEEZING: 0
ABDOMINAL DISTENTION: 0
APPETITE CHANGE: 0
TROUBLE SWALLOWING: 0
SHORTNESS OF BREATH: 0
DIARRHEA: 0
PALPITATIONS: 0

## 2024-02-28 NOTE — ASSESSMENT & PLAN NOTE
Persistent pain in knees and hips, post total hip replacement, patient request dosing of amoxicillin before dental procedures, states that his orthopedic physician told him to do this.  Patient was advised about the recommendations from the [employer] about not needing prophylactic antibiotics after at least a year from total hip replacement, advised the patient though that we would send medication to his pharmacy when he needs it.  Patient also complaining of persistent knee pain especially his right knee, the knee was injected today with a combination of 40 mg triamcinolone and 2 cc 0.5% bupivacaine.  Advised to avoid nonsteroidal anti-inflammatory use given his sister renal dysfunction and high blood pressure.

## 2024-02-28 NOTE — ASSESSMENT & PLAN NOTE
Blood pressure slightly elevated today in the office, home blood pressures according to the patient have been below 140/90, cardiology recently started hydralazine and clonidine, they are adjusting medication.  He is having some mild side effects supposedly to the clonidine including dry mouth and some intermittent constipation.  He has had an extensive workup done with endocrinology including metanephrines and had some evaluation done by nephrology that was all normal.  Recommend follow-up with cardiologist who is currently managing his high blood pressure.

## 2024-02-28 NOTE — ASSESSMENT & PLAN NOTE
New with current dosing of statin along with Zetia, could consider optimizing rosuvastatin to a higher dose.  Check blood testing in the next month to assess for liver function testing and lipid profile.

## 2024-02-28 NOTE — ASSESSMENT & PLAN NOTE
Most recent blood testing shows GFR over 80, need to control blood pressure, did see nephrology in the past few months, acute renal failure seems to have improved with discontinuation of spironolactone.  Patient actually was hospitalized in August with elevation in potassium and acute renal failure, since then has seemed to have improved.

## 2024-02-28 NOTE — PROGRESS NOTES
Subjective   Patient ID: NITA Bustos is a 68 y.o. male who presents for NP, ESTAB CARE.    HPI   Patient sees Cardiology and Endocrinology who manage his diabetes and HTN  Started Clonidine at lat OV, and hydralazine  Heart rate better, HBP below 140/90  No headache, chest pain, shortness of breath, dizziness, lightheadedness, or edema  Having issues right ear, had vent tube a few years ago, has had some infections in the past year, ears pop, having hearing issues in right ear  No low blood sugars since last OV, seen opthalmology in the past year, and no numbness or tingling in feet, skin normal.  Patient has been using their CPAP nightly and is experiencing restful sleep, no morning headaches, no witnessed snoring, and notices a difference if they do not use the device.  No GI issues  Joint Injection Large/Arthrocentesis: R knee on 2/28/2024 12:32 PM  Indications: pain  Details: 25 G needle, anterolateral approach  Medications: 40 mg triamcinolone acetonide 40 mg/mL  Outcome: tolerated well, no immediate complications    Also injected 2 cc 0.5% bupivacaine.  Procedure, treatment alternatives, risks and benefits explained, specific risks discussed. Consent was given by the patient.            Review of Systems   Constitutional:  Negative for activity change, appetite change, fatigue and unexpected weight change.   HENT:  Negative for ear pain, nosebleeds, rhinorrhea, sneezing and trouble swallowing.    Respiratory:  Negative for cough, shortness of breath and wheezing.    Cardiovascular:  Negative for chest pain, palpitations and leg swelling.   Gastrointestinal:  Negative for abdominal distention, abdominal pain, constipation, diarrhea, nausea and vomiting.   Genitourinary:  Negative for difficulty urinating.   Musculoskeletal:  Positive for arthralgias and gait problem.   Skin:  Negative for rash.   Neurological:  Negative for dizziness, light-headedness, numbness and headaches.   Hematological:  Negative  "for adenopathy.   Psychiatric/Behavioral:  Negative for behavioral problems, dysphoric mood and sleep disturbance. The patient is not nervous/anxious.    All other systems reviewed and are negative.      Objective   BP (!) 140/100   Pulse 72   Ht 1.651 m (5' 5\")   Wt 113 kg (250 lb 3.2 oz)   SpO2 96%   BMI 41.64 kg/m²     Physical Exam  Vitals and nursing note reviewed.   Constitutional:       General: He is not in acute distress.     Appearance: Normal appearance. He is not toxic-appearing.   HENT:      Head: Normocephalic and atraumatic.      Right Ear: Tympanic membrane, ear canal and external ear normal.      Left Ear: Tympanic membrane, ear canal and external ear normal.      Nose: Nose normal.      Mouth/Throat:      Mouth: Mucous membranes are moist.      Pharynx: Oropharynx is clear.   Eyes:      Extraocular Movements: Extraocular movements intact.      Conjunctiva/sclera: Conjunctivae normal.      Pupils: Pupils are equal, round, and reactive to light.   Cardiovascular:      Rate and Rhythm: Normal rate and regular rhythm.      Pulses: Normal pulses.      Heart sounds: Normal heart sounds.   Pulmonary:      Effort: Pulmonary effort is normal.      Breath sounds: Normal breath sounds.   Abdominal:      General: Abdomen is flat. Bowel sounds are normal.      Palpations: Abdomen is soft.   Musculoskeletal:      Cervical back: Normal range of motion and neck supple.   Skin:     General: Skin is warm and dry.      Capillary Refill: Capillary refill takes less than 2 seconds.   Neurological:      General: No focal deficit present.      Mental Status: He is alert and oriented to person, place, and time. Mental status is at baseline.   Psychiatric:         Mood and Affect: Mood normal.         Behavior: Behavior normal.         Assessment/Plan   Problem List Items Addressed This Visit             ICD-10-CM    Anxiety disorder F41.9     Seems to be stable and not currently using medication.         Relevant " Orders    Follow Up In Primary Care - Established    Benign essential hypertension - Primary I10     Blood pressure slightly elevated today in the office, home blood pressures according to the patient have been below 140/90, cardiology recently started hydralazine and clonidine, they are adjusting medication.  He is having some mild side effects supposedly to the clonidine including dry mouth and some intermittent constipation.  He has had an extensive workup done with endocrinology including metanephrines and had some evaluation done by nephrology that was all normal.  Recommend follow-up with cardiologist who is currently managing his high blood pressure.         Relevant Orders    Follow Up In Primary Care - Established    Comprehensive Metabolic Panel    Diabetes mellitus type 2, controlled (CMS/[facility]) E11.9     Is currently following with endocrinology who prefers to manage his medications, recent A1c testing 7.8, not having issues with low blood sugars, just recently started on GLP-1, recommend weaning eventually off of glipizide, may also want to consider adding SGL 2.         Relevant Orders    Follow Up In Primary Care - Established    Comprehensive Metabolic Panel    Type 2 diabetes mellitus (CMS/[facility]) E11.9     Being managed by endocrinology.         Relevant Medications    insulin glargine (Basaglar KwikPen U-100 Insulin) 100 unit/mL (3 mL) pen    Other Relevant Orders    Follow Up In Primary Care - Established    Dyslipidemia E78.5     New with current dosing of statin along with Zetia, could consider optimizing rosuvastatin to a higher dose.  Check blood testing in the next month to assess for liver function testing and lipid profile.         Relevant Medications    ezetimibe (Zetia) 10 mg tablet    Other Relevant Orders    Follow Up In Primary Care - Established    Comprehensive Metabolic Panel    Lipid Panel    Gout M10.9     Uric acid levels in the past been okay, no issues with acute gout.          Relevant Orders    Follow Up In Primary Care - Established    RESOLVED: Morbid obesity (CMS/[facility]) E66.01    Relevant Orders    Follow Up In Primary Care - Established    Obesity, morbid, BMI 40.0-49.9 (CMS/[facility]) E66.01     Encouraged about diet and exercise.         Relevant Orders    Follow Up In Primary Care - Established    Obstructive sleep apnea, adult G47.33     Patient has been using their CPAP nightly and is experiencing restful sleep, no morning headaches, no witnessed snoring, and notices a difference if they do not use the device.           Relevant Orders    Follow Up In Primary Care - Established    Arthritis M19.90     Persistent pain in knees and hips, post total hip replacement, patient request dosing of amoxicillin before dental procedures, states that his orthopedic physician told him to do this.  Patient was advised about the recommendations from the [employer] about not needing prophylactic antibiotics after at least a year from total hip replacement, advised the patient though that we would send medication to his pharmacy when he needs it.  Patient also complaining of persistent knee pain especially his right knee, the knee was injected today with a combination of 40 mg triamcinolone and 2 cc 0.5% bupivacaine.  Advised to avoid nonsteroidal anti-inflammatory use given his sister renal dysfunction and high blood pressure.         Stage 3a chronic kidney disease (CMS/[facility]) N18.31     Most recent blood testing shows GFR over 80, need to control blood pressure, did see nephrology in the past few months, acute renal failure seems to have improved with discontinuation of spironolactone.  Patient actually was hospitalized in August with elevation in potassium and acute renal failure, since then has seemed to have improved.         Relevant Orders    Follow Up In Primary Care - Established    Comprehensive Metabolic Panel     Other Visit Diagnoses         Codes    Type 2 diabetes mellitus  without complications (CMS/HCC)     E11.9    Relevant Medications    glipiZIDE XL (Glucotrol XL) 5 mg 24 hr tablet    Other Relevant Orders    Follow Up In Primary Care - Established    Screening for prostate cancer     Z12.5    Relevant Orders    Follow Up In Primary Care - Established    Prostate Specific Antigen, Screen

## 2024-02-28 NOTE — ASSESSMENT & PLAN NOTE
Is currently following with endocrinology who prefers to manage his medications, recent A1c testing 7.8, not having issues with low blood sugars, just recently started on GLP-1, recommend weaning eventually off of glipizide, may also want to consider adding SGL 2.

## 2024-03-13 ENCOUNTER — APPOINTMENT (OUTPATIENT)
Dept: PRIMARY CARE | Facility: CLINIC | Age: 69
End: 2024-03-13
Payer: MEDICARE

## 2024-03-18 DIAGNOSIS — R55 SYNCOPE, UNSPECIFIED SYNCOPE TYPE: ICD-10-CM

## 2024-03-18 RX ORDER — METOPROLOL TARTRATE 100 MG/1
100 TABLET ORAL 2 TIMES DAILY
Qty: 60 TABLET | Refills: 1 | Status: SHIPPED | OUTPATIENT
Start: 2024-03-18 | End: 2024-04-30 | Stop reason: SDUPTHER

## 2024-03-19 ENCOUNTER — LAB (OUTPATIENT)
Dept: LAB | Facility: LAB | Age: 69
End: 2024-03-19
Payer: MEDICARE

## 2024-03-19 DIAGNOSIS — I10 BENIGN ESSENTIAL HYPERTENSION: ICD-10-CM

## 2024-03-19 DIAGNOSIS — Z79.4 CONTROLLED TYPE 2 DIABETES MELLITUS WITHOUT COMPLICATION, WITH LONG-TERM CURRENT USE OF INSULIN (MULTI): ICD-10-CM

## 2024-03-19 DIAGNOSIS — I10 ESSENTIAL (PRIMARY) HYPERTENSION: Primary | ICD-10-CM

## 2024-03-19 DIAGNOSIS — E78.5 DYSLIPIDEMIA: ICD-10-CM

## 2024-03-19 DIAGNOSIS — Z12.5 SCREENING FOR PROSTATE CANCER: ICD-10-CM

## 2024-03-19 DIAGNOSIS — N18.31 STAGE 3A CHRONIC KIDNEY DISEASE (MULTI): ICD-10-CM

## 2024-03-19 DIAGNOSIS — E11.9 CONTROLLED TYPE 2 DIABETES MELLITUS WITHOUT COMPLICATION, WITH LONG-TERM CURRENT USE OF INSULIN (MULTI): ICD-10-CM

## 2024-03-19 DIAGNOSIS — Z79.899 OTHER LONG TERM (CURRENT) DRUG THERAPY: ICD-10-CM

## 2024-03-19 DIAGNOSIS — E78.2 MIXED HYPERLIPIDEMIA: ICD-10-CM

## 2024-03-19 LAB
ALBUMIN SERPL BCP-MCNC: 4 G/DL (ref 3.4–5)
ALP SERPL-CCNC: 47 U/L (ref 33–136)
ALT SERPL W P-5'-P-CCNC: 14 U/L (ref 10–52)
ANION GAP SERPL CALC-SCNC: 12 MMOL/L (ref 10–20)
AST SERPL W P-5'-P-CCNC: 14 U/L (ref 9–39)
BILIRUB SERPL-MCNC: 0.4 MG/DL (ref 0–1.2)
BUN SERPL-MCNC: 17 MG/DL (ref 6–23)
CALCIUM SERPL-MCNC: 9.1 MG/DL (ref 8.6–10.3)
CHLORIDE SERPL-SCNC: 105 MMOL/L (ref 98–107)
CHOLEST SERPL-MCNC: 110 MG/DL (ref 0–199)
CHOLESTEROL/HDL RATIO: 2.8
CO2 SERPL-SCNC: 25 MMOL/L (ref 21–32)
CREAT SERPL-MCNC: 1.04 MG/DL (ref 0.5–1.3)
EGFRCR SERPLBLD CKD-EPI 2021: 79 ML/MIN/1.73M*2
GLUCOSE SERPL-MCNC: 141 MG/DL (ref 74–99)
HDLC SERPL-MCNC: 39 MG/DL
LDLC SERPL CALC-MCNC: 43 MG/DL
MAGNESIUM SERPL-MCNC: 1.59 MG/DL (ref 1.6–2.4)
NON HDL CHOLESTEROL: 71 MG/DL (ref 0–149)
POTASSIUM SERPL-SCNC: 3.9 MMOL/L (ref 3.5–5.3)
PROT SERPL-MCNC: 6.6 G/DL (ref 6.4–8.2)
PSA SERPL-MCNC: 2.64 NG/ML
SODIUM SERPL-SCNC: 138 MMOL/L (ref 136–145)
TRIGL SERPL-MCNC: 138 MG/DL (ref 0–149)
VLDL: 28 MG/DL (ref 0–40)

## 2024-03-19 PROCEDURE — 36415 COLL VENOUS BLD VENIPUNCTURE: CPT

## 2024-03-19 PROCEDURE — 80061 LIPID PANEL: CPT

## 2024-03-19 PROCEDURE — 83735 ASSAY OF MAGNESIUM: CPT

## 2024-03-19 PROCEDURE — 84244 ASSAY OF RENIN: CPT

## 2024-03-19 PROCEDURE — G0103 PSA SCREENING: HCPCS

## 2024-03-19 PROCEDURE — 82088 ASSAY OF ALDOSTERONE: CPT

## 2024-03-19 PROCEDURE — 80053 COMPREHEN METABOLIC PANEL: CPT

## 2024-03-21 LAB — ALDOST SERPL-MCNC: 9.9 NG/DL

## 2024-03-22 LAB — RENIN PLAS-CCNC: 0.2 NG/ML/HR

## 2024-03-28 ENCOUNTER — OFFICE VISIT (OUTPATIENT)
Dept: PRIMARY CARE | Facility: CLINIC | Age: 69
End: 2024-03-28
Payer: MEDICARE

## 2024-03-28 VITALS
HEART RATE: 75 BPM | BODY MASS INDEX: 41.32 KG/M2 | OXYGEN SATURATION: 99 % | HEIGHT: 65 IN | DIASTOLIC BLOOD PRESSURE: 80 MMHG | WEIGHT: 248 LBS | SYSTOLIC BLOOD PRESSURE: 152 MMHG

## 2024-03-28 DIAGNOSIS — M1A.00X0 IDIOPATHIC CHRONIC GOUT WITHOUT TOPHUS, UNSPECIFIED SITE: ICD-10-CM

## 2024-03-28 DIAGNOSIS — E11.9 TYPE 2 DIABETES MELLITUS WITHOUT COMPLICATIONS (MULTI): Primary | ICD-10-CM

## 2024-03-28 DIAGNOSIS — E78.5 DYSLIPIDEMIA: ICD-10-CM

## 2024-03-28 DIAGNOSIS — M19.90 ARTHRITIS: ICD-10-CM

## 2024-03-28 DIAGNOSIS — F41.1 GENERALIZED ANXIETY DISORDER: ICD-10-CM

## 2024-03-28 DIAGNOSIS — E66.01 OBESITY, MORBID, BMI 40.0-49.9 (MULTI): ICD-10-CM

## 2024-03-28 DIAGNOSIS — E11.22 TYPE 2 DIABETES MELLITUS WITH STAGE 3A CHRONIC KIDNEY DISEASE, WITHOUT LONG-TERM CURRENT USE OF INSULIN (MULTI): ICD-10-CM

## 2024-03-28 DIAGNOSIS — N18.31 TYPE 2 DIABETES MELLITUS WITH STAGE 3A CHRONIC KIDNEY DISEASE, WITHOUT LONG-TERM CURRENT USE OF INSULIN (MULTI): ICD-10-CM

## 2024-03-28 DIAGNOSIS — R00.0 TACHYCARDIA: ICD-10-CM

## 2024-03-28 DIAGNOSIS — G47.33 OBSTRUCTIVE SLEEP APNEA, ADULT: ICD-10-CM

## 2024-03-28 DIAGNOSIS — I10 BENIGN ESSENTIAL HYPERTENSION: ICD-10-CM

## 2024-03-28 DIAGNOSIS — N18.31 STAGE 3A CHRONIC KIDNEY DISEASE (MULTI): ICD-10-CM

## 2024-03-28 DIAGNOSIS — Z12.5 SCREENING FOR PROSTATE CANCER: ICD-10-CM

## 2024-03-28 PROBLEM — N18.9 CHRONIC KIDNEY DISEASE: Status: RESOLVED | Noted: 2023-09-13 | Resolved: 2024-03-28

## 2024-03-28 PROCEDURE — 3079F DIAST BP 80-89 MM HG: CPT | Performed by: FAMILY MEDICINE

## 2024-03-28 PROCEDURE — 1036F TOBACCO NON-USER: CPT | Performed by: FAMILY MEDICINE

## 2024-03-28 PROCEDURE — 3077F SYST BP >= 140 MM HG: CPT | Performed by: FAMILY MEDICINE

## 2024-03-28 PROCEDURE — 1159F MED LIST DOCD IN RCRD: CPT | Performed by: FAMILY MEDICINE

## 2024-03-28 PROCEDURE — 3051F HG A1C>EQUAL 7.0%<8.0%: CPT | Performed by: FAMILY MEDICINE

## 2024-03-28 PROCEDURE — 1160F RVW MEDS BY RX/DR IN RCRD: CPT | Performed by: FAMILY MEDICINE

## 2024-03-28 PROCEDURE — 3008F BODY MASS INDEX DOCD: CPT | Performed by: FAMILY MEDICINE

## 2024-03-28 PROCEDURE — 99213 OFFICE O/P EST LOW 20 MIN: CPT | Performed by: FAMILY MEDICINE

## 2024-03-28 PROCEDURE — 3048F LDL-C <100 MG/DL: CPT | Performed by: FAMILY MEDICINE

## 2024-03-28 RX ORDER — LORAZEPAM 0.5 MG/1
0.5 TABLET ORAL EVERY 6 HOURS PRN
Qty: 28 TABLET | Refills: 0 | Status: SHIPPED | OUTPATIENT
Start: 2024-03-28 | End: 2024-04-04

## 2024-03-28 RX ORDER — HYDRALAZINE HYDROCHLORIDE 25 MG/1
25 TABLET, FILM COATED ORAL 3 TIMES DAILY
Qty: 90 TABLET | Refills: 11 | Status: SHIPPED | OUTPATIENT
Start: 2024-03-28 | End: 2024-04-01 | Stop reason: WASHOUT

## 2024-03-28 ASSESSMENT — ENCOUNTER SYMPTOMS
DIARRHEA: 0
DIZZINESS: 0
APPETITE CHANGE: 0
RHINORRHEA: 0
TROUBLE SWALLOWING: 0
NAUSEA: 0
COUGH: 0
WHEEZING: 0
FATIGUE: 0
CONSTIPATION: 0
UNEXPECTED WEIGHT CHANGE: 0
ADENOPATHY: 0
HEADACHES: 0
VOMITING: 0
LIGHT-HEADEDNESS: 0
ABDOMINAL DISTENTION: 0
DIFFICULTY URINATING: 0
SHORTNESS OF BREATH: 0
ACTIVITY CHANGE: 0
ABDOMINAL PAIN: 0
NUMBNESS: 0
PALPITATIONS: 0
ARTHRALGIAS: 0

## 2024-03-28 NOTE — ASSESSMENT & PLAN NOTE
Patient to see endocrinologist in the next week, last A1c testing was above goal, patient has just recently started GLP-1, recommend titration of this medication and probable discontinuation of glipizide, patient states the endocrinologist would like to adjust medications herself.

## 2024-03-28 NOTE — PROGRESS NOTES
"Subjective   Patient ID: NITA Bustos is a 68 y.o. male who presents for Follow-up (1 MO FUV).    HPI   No headache, chest pain, shortness of breath, dizziness, lightheadedness, or edema  No low blood sugars since last OV, seen opthalmology in the past year, and no numbness or tingling in feet, skin normal.  's over 80, to see cardiology next week  To see endocrinology later next week, taking glipizide, started Ozempic 0.25 mg a week  Knee pain better  Seen ENT in the past month, using a custom ear plug      Review of Systems   Constitutional:  Negative for activity change, appetite change, fatigue and unexpected weight change.   HENT:  Negative for ear pain, nosebleeds, rhinorrhea, sneezing and trouble swallowing.    Respiratory:  Negative for cough, shortness of breath and wheezing.    Cardiovascular:  Negative for chest pain, palpitations and leg swelling.   Gastrointestinal:  Negative for abdominal distention, abdominal pain, constipation, diarrhea, nausea and vomiting.   Genitourinary:  Negative for difficulty urinating.   Musculoskeletal:  Negative for arthralgias.   Skin:  Negative for rash.   Neurological:  Negative for dizziness, light-headedness, numbness and headaches.   Hematological:  Negative for adenopathy.   Psychiatric/Behavioral:  Negative for behavioral problems.    All other systems reviewed and are negative.      Objective   /80   Pulse 75   Ht 1.651 m (5' 5\")   Wt 112 kg (248 lb)   SpO2 99%   BMI 41.27 kg/m²     Physical Exam  Vitals and nursing note reviewed.   Constitutional:       Appearance: Normal appearance.   HENT:      Head: Normocephalic and atraumatic.      Right Ear: Tympanic membrane, ear canal and external ear normal.      Left Ear: Tympanic membrane, ear canal and external ear normal.      Nose: Nose normal.      Mouth/Throat:      Mouth: Mucous membranes are moist.      Pharynx: Oropharynx is clear.   Cardiovascular:      Rate and Rhythm: Normal rate and " regular rhythm.      Pulses: Normal pulses.      Heart sounds: Normal heart sounds.   Pulmonary:      Effort: Pulmonary effort is normal.      Breath sounds: Normal breath sounds.   Musculoskeletal:      Cervical back: Normal range of motion and neck supple.   Neurological:      Mental Status: He is alert.   Psychiatric:         Mood and Affect: Mood normal.         Behavior: Behavior normal.         Assessment/Plan   Problem List Items Addressed This Visit             ICD-10-CM    Anxiety disorder F41.9     Given small prescription of lorazepam today after review of State prescribing database, last prescription was nearly 4 years ago.  Uses periodically for anxiety and panic.  Was advised about office policy regarding prescribing controlled medicines continuously.         Relevant Medications    LORazepam (Ativan) 0.5 mg tablet    Other Relevant Orders    Follow Up In Primary Care - Established    Benign essential hypertension I10     Blood pressure still not at goal, cardiology has been following and adjusting medications.  Will not adjust any medications today and leave this to the patient's cardiologist who has been adjusting medications.         Relevant Orders    Follow Up In Primary Care - Established    Type 2 diabetes mellitus without complications (CMS/[facility]) - Primary E11.9     Patient to see endocrinologist in the next week, last A1c testing was above goal, patient has just recently started GLP-1, recommend titration of this medication and probable discontinuation of glipizide, patient states the endocrinologist would like to adjust medications herself.         Relevant Orders    Follow Up In Primary Care - Established    Dyslipidemia E78.5     Laboratory testing reveals acceptable lipid profile with normal liver function testing continue with current dosing of statin.         Relevant Orders    Follow Up In Primary Care - Established    Gout M10.9     Uric acid level at goal, no change.         Relevant  Orders    Follow Up In Primary Care - Established    Obesity, morbid, BMI 40.0-49.9 (CMS/[facility]) E66.01     Encouraged about diet and exercise.         Relevant Orders    Follow Up In Primary Care - Established    Obstructive sleep apnea, adult G47.33     Patient has been using their CPAP nightly and is experiencing restful sleep, no morning headaches, no witnessed snoring, and notices a difference if they do not use the device.           Relevant Orders    Follow Up In Primary Care - Established    Arthritis M19.90     Knee pain improved with injection.         Tachycardia R00.0    Relevant Medications    hydrALAZINE (Apresoline) 25 mg tablet    Stage 3a chronic kidney disease (CMS/[facility]) N18.31     Renal function actually stable, electrolytes normal, work on controlling blood pressure.         Relevant Orders    Follow Up In Primary Care - Established     Other Visit Diagnoses         Codes    Screening for prostate cancer     Z12.5    Relevant Orders    Follow Up In Primary Care - Established

## 2024-03-28 NOTE — ASSESSMENT & PLAN NOTE
Given small prescription of lorazepam today after review of State prescribing database, last prescription was nearly 4 years ago.  Uses periodically for anxiety and panic.  Was advised about office policy regarding prescribing controlled medicines continuously.

## 2024-03-28 NOTE — ASSESSMENT & PLAN NOTE
Laboratory testing reveals acceptable lipid profile with normal liver function testing continue with current dosing of statin.

## 2024-03-28 NOTE — ASSESSMENT & PLAN NOTE
Blood pressure still not at goal, cardiology has been following and adjusting medications.  Will not adjust any medications today and leave this to the patient's cardiologist who has been adjusting medications.

## 2024-04-01 ENCOUNTER — OFFICE VISIT (OUTPATIENT)
Dept: CARDIOLOGY | Facility: CLINIC | Age: 69
End: 2024-04-01
Payer: MEDICARE

## 2024-04-01 VITALS
BODY MASS INDEX: 44.3 KG/M2 | OXYGEN SATURATION: 96 % | HEIGHT: 63 IN | HEART RATE: 80 BPM | WEIGHT: 250 LBS | DIASTOLIC BLOOD PRESSURE: 100 MMHG | SYSTOLIC BLOOD PRESSURE: 170 MMHG

## 2024-04-01 DIAGNOSIS — I1A.0 RESISTANT HYPERTENSION: Primary | ICD-10-CM

## 2024-04-01 PROCEDURE — 3080F DIAST BP >= 90 MM HG: CPT | Performed by: STUDENT IN AN ORGANIZED HEALTH CARE EDUCATION/TRAINING PROGRAM

## 2024-04-01 PROCEDURE — 4010F ACE/ARB THERAPY RXD/TAKEN: CPT | Performed by: STUDENT IN AN ORGANIZED HEALTH CARE EDUCATION/TRAINING PROGRAM

## 2024-04-01 PROCEDURE — 3077F SYST BP >= 140 MM HG: CPT | Performed by: STUDENT IN AN ORGANIZED HEALTH CARE EDUCATION/TRAINING PROGRAM

## 2024-04-01 PROCEDURE — 3008F BODY MASS INDEX DOCD: CPT | Performed by: STUDENT IN AN ORGANIZED HEALTH CARE EDUCATION/TRAINING PROGRAM

## 2024-04-01 PROCEDURE — 1036F TOBACCO NON-USER: CPT | Performed by: STUDENT IN AN ORGANIZED HEALTH CARE EDUCATION/TRAINING PROGRAM

## 2024-04-01 PROCEDURE — 3051F HG A1C>EQUAL 7.0%<8.0%: CPT | Performed by: STUDENT IN AN ORGANIZED HEALTH CARE EDUCATION/TRAINING PROGRAM

## 2024-04-01 PROCEDURE — 99214 OFFICE O/P EST MOD 30 MIN: CPT | Performed by: STUDENT IN AN ORGANIZED HEALTH CARE EDUCATION/TRAINING PROGRAM

## 2024-04-01 PROCEDURE — 1160F RVW MEDS BY RX/DR IN RCRD: CPT | Performed by: STUDENT IN AN ORGANIZED HEALTH CARE EDUCATION/TRAINING PROGRAM

## 2024-04-01 PROCEDURE — 3048F LDL-C <100 MG/DL: CPT | Performed by: STUDENT IN AN ORGANIZED HEALTH CARE EDUCATION/TRAINING PROGRAM

## 2024-04-01 PROCEDURE — 1159F MED LIST DOCD IN RCRD: CPT | Performed by: STUDENT IN AN ORGANIZED HEALTH CARE EDUCATION/TRAINING PROGRAM

## 2024-04-01 RX ORDER — CLONIDINE HYDROCHLORIDE 0.2 MG/1
0.2 TABLET ORAL EVERY 8 HOURS
Qty: 270 TABLET | Refills: 3 | Status: SHIPPED | OUTPATIENT
Start: 2024-04-01 | End: 2025-04-01

## 2024-04-01 RX ORDER — LOSARTAN POTASSIUM 100 MG/1
100 TABLET ORAL DAILY
Qty: 90 TABLET | Refills: 3 | Status: SHIPPED | OUTPATIENT
Start: 2024-04-01 | End: 2024-04-30 | Stop reason: SDUPTHER

## 2024-04-01 NOTE — PROGRESS NOTES
Chief Complaint   Patient presents with    6 month f/u     HPI:  I was requested by Dr. Rodríguez to evaluate this patient in consultation for cardiac assessment.     Mr. Av Bustos is a 68-year-old male with history of severe obesity, sleep apnea on CPAP, hypertension, diabetes mellitus type 2, possible obesity hypoventilation syndrome,  hyperlipidemia presented with syncope, sinus bradycardia, NERY, hyperkalemia, hyperglycemia, metabolic acidosis, hypomagnesemia. He states that he is still tachycardic (90bpm) even with top dose Metoprolol. He denies chest pain, shortness of breath, palpitations, leg edema, lightheadedness, headaches, fever, chills, orthopnea, paroxysmal nocturnal dyspnea or syncope.  EKG shows normal sinus rhythm with no signs of ACS.  The echocardiogram showed normal LVEF 60% with no wall motion abnormalities.  72h monitor (09/2023) showing baseline sinus rhythm, average HR 93bpm, <1% extra-beats.     Last appointment, patient was feeling fine, still complained of elevated blood pressure. Notably, he reports that his BP has always been very high from his 20s, and sometimes associated with headache. He is compliant with the medication.    Patient returns today feeling well, but very concerned about his contantly elevated blood pressure measurements. Notably, he has White Coat Hypertension as well. Still complaining of elevated blood pressure. Notably, he reports that his BP has always been very high from his 20s, and sometimes associated with headache. He is compliant with the medication. Notably, he has been under a lot of stress, once he is taking care of his wife with cirrhosis. He brought today his home BP measurements, always elevated.       Past Medical History  Past Medical History:   Diagnosis Date    Anxiety 30 YEARS AGO    Arthritis     Cataract     Chronic kidney disease 2023    Diabetes mellitus (CMS/HCC) 2010    Encounter for screening for malignant neoplasm of prostate     Prostate cancer  screening    Essential (primary) hypertension 02/05/2018    HTN (hypertension), benign    Essential (primary) hypertension 05/07/2018    Benign essential hypertension    Essential (primary) hypertension 05/22/2018    Benign essential hypertension    Glaucoma     Headache     HL (hearing loss)     Personal history of other drug therapy 09/09/2019    History of influenza vaccination    Personal history of other endocrine, nutritional and metabolic disease 02/05/2018    History of type 2 diabetes mellitus    Personal history of other endocrine, nutritional and metabolic disease 05/07/2018    History of type 2 diabetes mellitus    Personal history of other endocrine, nutritional and metabolic disease 05/22/2018    History of type 2 diabetes mellitus    Personal history of other endocrine, nutritional and metabolic disease 09/10/2018    History of type 2 diabetes mellitus    Personal history of other endocrine, nutritional and metabolic disease 12/03/2018    History of type 2 diabetes mellitus    Personal history of other endocrine, nutritional and metabolic disease 03/20/2020    History of type 2 diabetes mellitus    Personal history of other endocrine, nutritional and metabolic disease 09/09/2019    History of type 2 diabetes mellitus    Personal history of other endocrine, nutritional and metabolic disease 06/26/2018    History of type 2 diabetes mellitus    Personal history of other endocrine, nutritional and metabolic disease 06/03/2019    History of type 2 diabetes mellitus    Personal history of other endocrine, nutritional and metabolic disease 12/15/2020    History of type 2 diabetes mellitus    Visual impairment WEAR GLASSES FOR DISTANCE       Past Surgical History  Past Surgical History:   Procedure Laterality Date    COLONOSCOPY  08/10/2020    repeat 5 yrs    JOINT REPLACEMENT  TOTAL LEFT HIP  03/2021    OTHER SURGICAL HISTORY  08/16/2021    Hip replacement    WISDOM TOOTH EXTRACTION         Past Family  "History  Family History   Problem Relation Name Age of Onset    COPD Mother INES QUIL     Dementia Mother INES QUIL     Alcohol abuse Mother INES QUIL     Depression Mother INES QUIL     Bone cancer Father JESS  QUIL     Alcohol abuse Father JESS  QUIL     Cancer Father JESS  QUIL     Stroke Father JESS  QUIL        Allergy History  No Known Allergies    Past Social History  Social History     Socioeconomic History    Marital status:      Spouse name: Ann    Number of children: None    Years of education: None    Highest education level: None   Occupational History    None   Tobacco Use    Smoking status: Never     Passive exposure: Never    Smokeless tobacco: Never   Vaping Use    Vaping Use: Never used   Substance and Sexual Activity    Alcohol use: Not Currently    Drug use: Never    Sexual activity: Not Currently     Partners: Female     Birth control/protection: None   Other Topics Concern    None   Social History Narrative    None     Social Determinants of Health     Financial Resource Strain: Not on file   Food Insecurity: Not on file   Transportation Needs: Not on file   Physical Activity: Not on file   Stress: Not on file   Social Connections: Not on file   Intimate Partner Violence: Not on file   Housing Stability: Not on file       Social History     Tobacco Use   Smoking Status Never    Passive exposure: Never   Smokeless Tobacco Never       Objective Data:  Last Recorded Vitals:  Vitals:    04/01/24 1526   BP: (!) 170/100   Pulse: 80   SpO2: 96%   Weight: 113 kg (250 lb)   Height: 1.6 m (5' 3\")       Last Labs:  CBC - 9/7/2023:  8:10 AM  7.6 11.9 232    37.8      CMP - 3/19/2024:  6:04 AM  9.1 6.6 14 --- 0.4   3.7 4.0 14 47      PTT - 8/21/2023:  8:29 AM  1.2   13.3 27     TROPHS   Date/Time Value Ref Range Status   08/21/2023 09:48 AM 4 0 - 20 ng/L Final     Comment:     .  Less than 99th percentile of normal range cutoff-  Female and children under 18 years old <14 ng/L; Male " <21 ng/L: Negative  Repeat testing should be performed if clinically indicated.   .  Female and children under 18 years old 14-50 ng/L; Male 21-50 ng/L:  Consistent with possible cardiac damage and possible increased clinical   risk. Serial measurements may help to assess extent of myocardial damage.   .  >50 ng/L: Consistent with cardiac damage, increased clinical risk and  myocardial infarction. Serial measurements may help assess extent of   myocardial damage.   .   NOTE: Children less than 1 year old may have higher baseline troponin   levels and results should be interpreted in conjunction with the overall   clinical context.   .  NOTE: Troponin I testing is performed using a different   testing methodology at [facility] than at other   [facility]. Direct result comparisons should only   be made within the same method.     08/21/2023 08:29 AM 5 0 - 20 ng/L Final     Comment:     .  Less than 99th percentile of normal range cutoff-  Female and children under 18 years old <14 ng/L; Male <21 ng/L: Negative  Repeat testing should be performed if clinically indicated.   .  Female and children under 18 years old 14-50 ng/L; Male 21-50 ng/L:  Consistent with possible cardiac damage and possible increased clinical   risk. Serial measurements may help to assess extent of myocardial damage.   .  >50 ng/L: Consistent with cardiac damage, increased clinical risk and  myocardial infarction. Serial measurements may help assess extent of   myocardial damage.   .   NOTE: Children less than 1 year old may have higher baseline troponin   levels and results should be interpreted in conjunction with the overall   clinical context.   .  NOTE: Troponin I testing is performed using a different   testing methodology at [facility] than at other   [facility]. Direct result comparisons should only   be made within the same method.       BNP   Date/Time Value Ref Range Status   08/21/2023 08:29 AM  54 0 - 99 pg/mL Final     Comment:     .  <100 pg/mL - Heart failure unlikely  100-299 pg/mL - Intermediate probability of acute heart  .               failure exacerbation. Correlate with clinical  .               context and patient history.    >=300 pg/mL - Heart Failure likely. Correlate with clinical  .               context and patient history.  BNP testing is performed using different testing   methodology at [facility] than at other   [facility]. Direct result comparisons should   only be made within the same method.       HGBA1C   Date/Time Value Ref Range Status   02/26/2024 09:13 AM 7.8 see below % Final   11/08/2023 09:42 AM 8.0 see below % Final     LDLCALC   Date/Time Value Ref Range Status   03/19/2024 06:04 AM 43 <=99 mg/dL Final     Comment:                                 Near   Borderline      AGE      Desirable  Optimal    High     High     Very High     0-19 Y     0 - 109     ---    110-129   >/= 130     ----    20-24 Y     0 - 119     ---    120-159   >/= 160     ----      >24 Y     0 -  99   100-129  130-159   160-189     >/=190       VLDL   Date/Time Value Ref Range Status   03/19/2024 06:04 AM 28 0 - 40 mg/dL Final   11/11/2022 09:38 AM 52 0 - 40 mg/dL Final   09/22/2020 10:24 AM 44 0 - 40 mg/dL Final   06/26/2018 12:00 AM 33 0 - 40 mg/dL Final        Patient Medications:  Outpatient Encounter Medications as of 4/1/2024   Medication Sig Dispense Refill    cholecalciferol, vitamin D3, (VITAMIN D3 ORAL) Take 1 tablet by mouth once daily.      cloNIDine (Catapres) 0.1 mg tablet Take 1 tablet (0.1 mg) by mouth every 8 hours. 90 tablet 1    dilTIAZem CD (Cardizem CD) 240 mg 24 hr capsule Take 1 capsule (240 mg) by mouth once daily. 30 capsule 11    ezetimibe (Zetia) 10 mg tablet Take 1 tablet (10 mg) by mouth once daily. 90 tablet 3    glipiZIDE XL (Glucotrol XL) 5 mg 24 hr tablet Take 1 tablet (5 mg) by mouth once daily. (Patient taking differently: Take 1 tablet (5 mg) by  mouth 2 times a day.) 90 tablet 3    hydrALAZINE (Apresoline) 25 mg tablet Take 1 tablet (25 mg) by mouth 3 times a day. 90 tablet 11    insulin aspart, with niacinamide, (Fiasp FlexTouch U-100 Insulin) 100 unit/mL (3 mL) injection 3 times a day with meals. Sliding scale      insulin glargine (Basaglar KwikPen U-100 Insulin) 100 unit/mL (3 mL) pen Inject 45 Units under the skin 2 times a day. Take 45 Units in morning and 4 units at bedtime 81 mL 3    LORazepam (Ativan) 0.5 mg tablet Take 1 tablet (0.5 mg) by mouth every 6 hours if needed for anxiety for up to 7 days. 28 tablet 0    metFORMIN (Glucophage) 500 mg tablet Take 2 tablets (1,000 mg) by mouth 2 times a day with meals. 360 tablet 3    metoprolol tartrate (Lopressor) 100 mg tablet Take 1 tablet (100 mg) by mouth 2 times a day. 60 tablet 1    OneTouch Delica Plus Lancet 33 gauge misc CHECK BLOOD SUGARS 3 TIMES A DAY      OneTouch Ultra Test strip CHECK BLOOD SUGAR THREE TIMES DAILY      Ozempic 0.25 mg or 0.5 mg (2 mg/3 mL) pen injector Inject 0.25 mg under the skin 1 (one) time per week.      rosuvastatin (Crestor) 5 mg tablet once daily at bedtime.      [DISCONTINUED] hydrALAZINE (Apresoline) 25 mg tablet Take 2 tablets (50 mg) by mouth 3 times a day. 180 tablet 11     No facility-administered encounter medications on file as of 4/1/2024.       Physical Exam:  General: alert, oriented and in no acute distress  HEENT: NC/AT; EOMI; PERRLA, external ear is normal  Neck: supple; trachea midline; no masses; no JVD  Chest: clear breath sounds bilaterally; no wheezing  Cardio: regular rhythm, S1S2 normal, no murmurs  Abdomen: Soft, non-tender, non-distension, no organomegaly  Extremities: no clubbing/cyanosis/edema  Neuro: Grossly intact     Psychiatric: Normal mood and affect     Past Cardiology Results (Last 3 Years):  EKG:  No results found for this or any previous visit from the past 1095 days.    Echo:  Echo Results:  No results found for this or any previous  visit from the past 365 days.     Cath:  No results found for this or any previous visit from the past 1095 days.    CV NCDR CATHPCI V5 COLLECTION FORM   Stress Test:  No results found for this or any previous visit from the past 1095 days.    Cardiac Imaging:  No results found for this or any previous visit from the past 1095 days.       Assessment/Plan   Mr. Av Bustos is a 68-year-old male with history of severe obesity, sleep apnea on CPAP, hypertension, diabetes mellitus type 2, possible obesity hypoventilation syndrome,  hyperlipidemia presented with syncope, sinus bradycardia, NERY, hyperkalemia, hyperglycemia, metabolic acidosis, hypomagnesemia. He states that he is still tachycardic (90bpm) even with top dose Metoprolol. He denies chest pain, shortness of breath, palpitations, leg edema, lightheadedness, headaches, fever, chills, orthopnea, paroxysmal nocturnal dyspnea or syncope.  EKG shows normal sinus rhythm with no signs of ACS.  The echocardiogram showed normal LVEF 60% with no wall motion abnormalities.  72h monitor (09/2023) showing baseline sinus rhythm, average HR 93bpm, <1% extra-beats.     Assessment     # Hypertension / White Coat Hypertension  - Patient returns today feeling well, but very concerned about his contantly elevated blood pressure measurements. Notably, he has White Coat Hypertension as well. Still complaining of elevated blood pressure. Notably, he reports that his BP has always been very high from his 20s, and sometimes associated with headache. He is compliant with the medication. Notably, he has been under a lot of stress, once he is taking care of his wife with cirrhosis. He brought today his home BP measurements, always elevated.   - Elevated BP in both arms, <10mmHg difference between them.  - Keep Metoprolol tartrate 100mg q12h.  -  Keep Cardizem CR 240mg daily  - Will switch Hydralazine to Losartan 100mg daily.  - Will increase Clonidine to 0.2mg q8h.  - Per prior note, if  blood pressure continued to be uncontrolled following clonidine increase, home dose hydrochlorothiazide could be reinitiated.   - Would avoid the reinitiation of spironolactone, diltiazem, and nadolol all given issues with hyperkalemia and bradycardia in the past. Patient also had cough with Lisinopril.    - Follow up in 1 month to titrate Clonidine and discuss hydrochlorothiazide.     # Sinus tachycardia  - Echo showing LVEF 60%  - Resting heart rate of 110 bpm; patient remains asymptomatic, today improvement in HR 85bpm  - Would suggest to keep Metoprolol tartrate to 100mg BID  - Follow up in Cardiology clinic with Dr. George.  - EKG shows normal sinus tachycardia rhythm with no signs of ACS.  - The echocardiogram showed normal LVEF 60% with no wall motion abnormalities.  - 72h monitor (09/2023) showing baseline sinus rhythm, average HR 93bpm, <1% extra-beats.  - Keep Metoprolol tartrate 100mg q12h, Diltiazem 240mg daily.     # T2DM  - Keep insulin Lispro, Glipizide  - ISS  - Patient had episode of hypoglycemia after 80u insulin; would suggest to adjust it accordingly.     # Hyperlipidemia  - Keep atorvastatin 40mg daily, Ezetimibe 10mg daily     # BATSHEVA on CPAP  - Keep CPAP    We have discussed the most common side effects of the prescribed medications, indications, drug interactions, risks, complications, and alternatives of medications/therapeutics were explained and discussed. The patient has been requested to monitor closely for any untoward side effects or complications of medications. The patient has been strongly advised to be compliant with the recommendations, all the questions and concerns have been addressed. The patient has been also instructed to call, to return sooner or to go to the emergency department if symptoms persist or get worsen. The patient voiced understanding and denies any further questions at this time.      This note was transcribed using the Dragon Dictation system. There may be  grammatical, punctuation, or verbiage errors that occur with voice recognition programs.     Counseling greater than 50% of visit regarding all cardiac issues.     Thank you, Dr. Rodríguez, for allowing me to participate in the care of this patient. Please do not hesitate to contact me with any further questions or concerns.     Nikunj Bettencourt MD  Cardiology

## 2024-04-16 ENCOUNTER — HOSPITAL ENCOUNTER (OUTPATIENT)
Dept: RADIOLOGY | Facility: HOSPITAL | Age: 69
Discharge: HOME | End: 2024-04-16
Payer: MEDICARE

## 2024-04-16 DIAGNOSIS — I10 ESSENTIAL (PRIMARY) HYPERTENSION: ICD-10-CM

## 2024-04-16 PROCEDURE — 74150 CT ABDOMEN W/O CONTRAST: CPT

## 2024-04-16 PROCEDURE — 74150 CT ABDOMEN W/O CONTRAST: CPT | Performed by: RADIOLOGY

## 2024-04-26 ENCOUNTER — LAB (OUTPATIENT)
Dept: LAB | Facility: LAB | Age: 69
End: 2024-04-26
Payer: MEDICARE

## 2024-04-26 DIAGNOSIS — E11.65 TYPE 2 DIABETES MELLITUS WITH HYPERGLYCEMIA, WITH LONG-TERM CURRENT USE OF INSULIN (MULTI): ICD-10-CM

## 2024-04-26 DIAGNOSIS — E11.65 TYPE 2 DIABETES MELLITUS WITH HYPERGLYCEMIA (MULTI): ICD-10-CM

## 2024-04-26 DIAGNOSIS — Z79.4 LONG TERM (CURRENT) USE OF INSULIN (MULTI): ICD-10-CM

## 2024-04-26 DIAGNOSIS — Z12.5 SPECIAL SCREENING FOR MALIGNANT NEOPLASM OF PROSTATE: ICD-10-CM

## 2024-04-26 DIAGNOSIS — E78.5 HYPERLIPIDEMIA, UNSPECIFIED: ICD-10-CM

## 2024-04-26 DIAGNOSIS — I10 BENIGN ESSENTIAL HYPERTENSION: ICD-10-CM

## 2024-04-26 DIAGNOSIS — I1A.0 RESISTANT HYPERTENSION: ICD-10-CM

## 2024-04-26 DIAGNOSIS — E66.01 OBESITY, MORBID, BMI 40.0-49.9 (MULTI): ICD-10-CM

## 2024-04-26 DIAGNOSIS — I10 ESSENTIAL (PRIMARY) HYPERTENSION: Primary | ICD-10-CM

## 2024-04-26 DIAGNOSIS — E78.5 HYPERLIPIDEMIA, UNSPECIFIED HYPERLIPIDEMIA TYPE: ICD-10-CM

## 2024-04-26 DIAGNOSIS — E66.01 MORBID (SEVERE) OBESITY DUE TO EXCESS CALORIES (MULTI): ICD-10-CM

## 2024-04-26 DIAGNOSIS — Z79.4 TYPE 2 DIABETES MELLITUS WITH HYPERGLYCEMIA, WITH LONG-TERM CURRENT USE OF INSULIN (MULTI): ICD-10-CM

## 2024-04-26 DIAGNOSIS — G47.33 OBSTRUCTIVE SLEEP APNEA, ADULT: ICD-10-CM

## 2024-04-26 DIAGNOSIS — G47.33 OBSTRUCTIVE SLEEP APNEA (ADULT) (PEDIATRIC): ICD-10-CM

## 2024-04-26 DIAGNOSIS — I10 ESSENTIAL (PRIMARY) HYPERTENSION: ICD-10-CM

## 2024-04-26 LAB
ANION GAP SERPL CALC-SCNC: 10 MMOL/L (ref 10–20)
BUN SERPL-MCNC: 16 MG/DL (ref 6–23)
CALCIUM SERPL-MCNC: 9.2 MG/DL (ref 8.6–10.3)
CHLORIDE SERPL-SCNC: 103 MMOL/L (ref 98–107)
CO2 SERPL-SCNC: 28 MMOL/L (ref 21–32)
CREAT SERPL-MCNC: 1.05 MG/DL (ref 0.5–1.3)
EGFRCR SERPLBLD CKD-EPI 2021: 78 ML/MIN/1.73M*2
GLUCOSE SERPL-MCNC: 223 MG/DL (ref 74–99)
POTASSIUM SERPL-SCNC: 3.8 MMOL/L (ref 3.5–5.3)
SODIUM SERPL-SCNC: 137 MMOL/L (ref 136–145)

## 2024-04-26 PROCEDURE — 80048 BASIC METABOLIC PNL TOTAL CA: CPT

## 2024-04-26 PROCEDURE — 36415 COLL VENOUS BLD VENIPUNCTURE: CPT

## 2024-04-30 ENCOUNTER — OFFICE VISIT (OUTPATIENT)
Dept: CARDIOLOGY | Facility: CLINIC | Age: 69
End: 2024-04-30
Payer: MEDICARE

## 2024-04-30 VITALS
DIASTOLIC BLOOD PRESSURE: 82 MMHG | WEIGHT: 252 LBS | HEART RATE: 60 BPM | OXYGEN SATURATION: 95 % | HEIGHT: 63 IN | SYSTOLIC BLOOD PRESSURE: 140 MMHG | BODY MASS INDEX: 44.65 KG/M2

## 2024-04-30 DIAGNOSIS — R00.0 TACHYCARDIA: ICD-10-CM

## 2024-04-30 DIAGNOSIS — I1A.0 RESISTANT HYPERTENSION: ICD-10-CM

## 2024-04-30 DIAGNOSIS — R55 SYNCOPE, UNSPECIFIED SYNCOPE TYPE: ICD-10-CM

## 2024-04-30 PROCEDURE — 3077F SYST BP >= 140 MM HG: CPT | Performed by: STUDENT IN AN ORGANIZED HEALTH CARE EDUCATION/TRAINING PROGRAM

## 2024-04-30 PROCEDURE — 99214 OFFICE O/P EST MOD 30 MIN: CPT | Performed by: STUDENT IN AN ORGANIZED HEALTH CARE EDUCATION/TRAINING PROGRAM

## 2024-04-30 PROCEDURE — 3048F LDL-C <100 MG/DL: CPT | Performed by: STUDENT IN AN ORGANIZED HEALTH CARE EDUCATION/TRAINING PROGRAM

## 2024-04-30 PROCEDURE — 3008F BODY MASS INDEX DOCD: CPT | Performed by: STUDENT IN AN ORGANIZED HEALTH CARE EDUCATION/TRAINING PROGRAM

## 2024-04-30 PROCEDURE — 1160F RVW MEDS BY RX/DR IN RCRD: CPT | Performed by: STUDENT IN AN ORGANIZED HEALTH CARE EDUCATION/TRAINING PROGRAM

## 2024-04-30 PROCEDURE — 4010F ACE/ARB THERAPY RXD/TAKEN: CPT | Performed by: STUDENT IN AN ORGANIZED HEALTH CARE EDUCATION/TRAINING PROGRAM

## 2024-04-30 PROCEDURE — 3051F HG A1C>EQUAL 7.0%<8.0%: CPT | Performed by: STUDENT IN AN ORGANIZED HEALTH CARE EDUCATION/TRAINING PROGRAM

## 2024-04-30 PROCEDURE — 1036F TOBACCO NON-USER: CPT | Performed by: STUDENT IN AN ORGANIZED HEALTH CARE EDUCATION/TRAINING PROGRAM

## 2024-04-30 PROCEDURE — 1159F MED LIST DOCD IN RCRD: CPT | Performed by: STUDENT IN AN ORGANIZED HEALTH CARE EDUCATION/TRAINING PROGRAM

## 2024-04-30 PROCEDURE — 3079F DIAST BP 80-89 MM HG: CPT | Performed by: STUDENT IN AN ORGANIZED HEALTH CARE EDUCATION/TRAINING PROGRAM

## 2024-04-30 RX ORDER — METOPROLOL TARTRATE 100 MG/1
100 TABLET ORAL 2 TIMES DAILY
Qty: 180 TABLET | Refills: 3 | Status: SHIPPED | OUTPATIENT
Start: 2024-04-30 | End: 2024-06-10 | Stop reason: DRUGHIGH

## 2024-04-30 RX ORDER — DILTIAZEM HYDROCHLORIDE 240 MG/1
240 CAPSULE, COATED, EXTENDED RELEASE ORAL DAILY
Qty: 90 CAPSULE | Refills: 3 | Status: SHIPPED | OUTPATIENT
Start: 2024-04-30 | End: 2025-04-30

## 2024-04-30 RX ORDER — LOSARTAN POTASSIUM 100 MG/1
100 TABLET ORAL DAILY
Qty: 90 TABLET | Refills: 3 | Status: SHIPPED | OUTPATIENT
Start: 2024-04-30 | End: 2025-04-30

## 2024-04-30 NOTE — PROGRESS NOTES
No chief complaint on file.    HPI:  I  was requested by Dr. Rodríguez to evaluate this patient in consultation for cardiac assessment.     Mr. Av Bustos is a 68-year-old male with history of severe obesity, sleep apnea on CPAP, hypertension, diabetes mellitus type 2, possible obesity hypoventilation syndrome,  hyperlipidemia presented with syncope, sinus bradycardia, NERY, hyperkalemia, hyperglycemia, metabolic acidosis, hypomagnesemia. He states that he is still tachycardic (90bpm) even with top dose Metoprolol. He denies chest pain, shortness of breath, palpitations, leg edema, lightheadedness, headaches, fever, chills, orthopnea, paroxysmal nocturnal dyspnea or syncope.  EKG shows normal sinus rhythm with no signs of ACS.  The echocardiogram showed normal LVEF 60% with no wall motion abnormalities.  72h monitor (09/2023) showing baseline sinus rhythm, average HR 93bpm, <1% extra-beats.     Last appointment, patient was feeling fine, still complained of elevated blood pressure. Notably, he reports that his BP has always been very high from his 20s, and sometimes associated with headache. He is compliant with the medication.     Patient returned last appointment feeling well, but very concerned about his constantly elevated blood pressure measurements. Notably, he has White Coat Hypertension as well. Still complaining of elevated blood pressure. Notably, he reports that his BP has always been very high from his 20s, and sometimes associated with headache. He is compliant with the medication. Notably, he has been under a lot of stress, once he is taking care of his wife with cirrhosis. He brought today his home BP measurements, always elevated.     Patient returns today with a more controlled BP. He brought a BP dairy with BP ~130-140s. He is feeling a little bit more tired that usual. He was suggested to avoid sedentary and be more active.      Past Medical History  Past Medical History:   Diagnosis Date    Anxiety 30  YEARS AGO    Arthritis     Cataract     Chronic kidney disease 2023    Diabetes mellitus (Multi) 2010    Encounter for screening for malignant neoplasm of prostate     Prostate cancer screening    Essential (primary) hypertension 02/05/2018    HTN (hypertension), benign    Essential (primary) hypertension 05/07/2018    Benign essential hypertension    Essential (primary) hypertension 05/22/2018    Benign essential hypertension    Glaucoma     Headache     HL (hearing loss)     Personal history of other drug therapy 09/09/2019    History of influenza vaccination    Personal history of other endocrine, nutritional and metabolic disease 02/05/2018    History of type 2 diabetes mellitus    Personal history of other endocrine, nutritional and metabolic disease 05/07/2018    History of type 2 diabetes mellitus    Personal history of other endocrine, nutritional and metabolic disease 05/22/2018    History of type 2 diabetes mellitus    Personal history of other endocrine, nutritional and metabolic disease 09/10/2018    History of type 2 diabetes mellitus    Personal history of other endocrine, nutritional and metabolic disease 12/03/2018    History of type 2 diabetes mellitus    Personal history of other endocrine, nutritional and metabolic disease 03/20/2020    History of type 2 diabetes mellitus    Personal history of other endocrine, nutritional and metabolic disease 09/09/2019    History of type 2 diabetes mellitus    Personal history of other endocrine, nutritional and metabolic disease 06/26/2018    History of type 2 diabetes mellitus    Personal history of other endocrine, nutritional and metabolic disease 06/03/2019    History of type 2 diabetes mellitus    Personal history of other endocrine, nutritional and metabolic disease 12/15/2020    History of type 2 diabetes mellitus    Visual impairment WEAR GLASSES FOR DISTANCE       Past Surgical History  Past Surgical History:   Procedure Laterality Date    COLONOSCOPY   08/10/2020    repeat 5 yrs    JOINT REPLACEMENT  TOTAL LEFT HIP  03/2021    OTHER SURGICAL HISTORY  08/16/2021    Hip replacement    WISDOM TOOTH EXTRACTION         Past Family History  Family History   Problem Relation Name Age of Onset    COPD Mother INES QUIL     Dementia Mother INES QUIL     Alcohol abuse Mother INES QUIL     Depression Mother INES QUIL     Bone cancer Father JESS  QUIL     Alcohol abuse Father JESS  QUIL     Cancer Father JESS  QUIL     Stroke Father JESS  QUIL        Allergy History  No Known Allergies    Past Social History  Social History     Socioeconomic History    Marital status:      Spouse name: Ann    Number of children: None    Years of education: None    Highest education level: None   Occupational History    None   Tobacco Use    Smoking status: Never     Passive exposure: Never    Smokeless tobacco: Never   Vaping Use    Vaping status: Never Used   Substance and Sexual Activity    Alcohol use: Not Currently    Drug use: Never    Sexual activity: Not Currently     Partners: Female     Birth control/protection: None   Other Topics Concern    None   Social History Narrative    None     Social Determinants of Health     Financial Resource Strain: Not on file   Food Insecurity: Not on file   Transportation Needs: Not on file   Physical Activity: Not on file   Stress: Not on file   Social Connections: Not on file   Intimate Partner Violence: Not on file   Housing Stability: Not on file       Social History     Tobacco Use   Smoking Status Never    Passive exposure: Never   Smokeless Tobacco Never       Objective Data:  Last Recorded Vitals:  There were no vitals filed for this visit.    Last Labs:  CBC - 9/7/2023:  8:10 AM  7.6 11.9 232    37.8      CMP - 4/26/2024:  9:55 AM  9.2 6.6 14 --- 0.4   3.7 4.0 14 47      PTT - 8/21/2023:  8:29 AM  1.2   13.3 27     TROPHS   Date/Time Value Ref Range Status   08/21/2023 09:48 AM 4 0 - 20 ng/L Final     Comment:      .  Less than 99th percentile of normal range cutoff-  Female and children under 18 years old <14 ng/L; Male <21 ng/L: Negative  Repeat testing should be performed if clinically indicated.   .  Female and children under 18 years old 14-50 ng/L; Male 21-50 ng/L:  Consistent with possible cardiac damage and possible increased clinical   risk. Serial measurements may help to assess extent of myocardial damage.   .  >50 ng/L: Consistent with cardiac damage, increased clinical risk and  myocardial infarction. Serial measurements may help assess extent of   myocardial damage.   .   NOTE: Children less than 1 year old may have higher baseline troponin   levels and results should be interpreted in conjunction with the overall   clinical context.   .  NOTE: Troponin I testing is performed using a different   testing methodology at [facility] than at other   [facility] hospitals. Direct result comparisons should only   be made within the same method.     08/21/2023 08:29 AM 5 0 - 20 ng/L Final     Comment:     .  Less than 99th percentile of normal range cutoff-  Female and children under 18 years old <14 ng/L; Male <21 ng/L: Negative  Repeat testing should be performed if clinically indicated.   .  Female and children under 18 years old 14-50 ng/L; Male 21-50 ng/L:  Consistent with possible cardiac damage and possible increased clinical   risk. Serial measurements may help to assess extent of myocardial damage.   .  >50 ng/L: Consistent with cardiac damage, increased clinical risk and  myocardial infarction. Serial measurements may help assess extent of   myocardial damage.   .   NOTE: Children less than 1 year old may have higher baseline troponin   levels and results should be interpreted in conjunction with the overall   clinical context.   .  NOTE: Troponin I testing is performed using a different   testing methodology at [facility] than at other   [facility] hospitals. Direct result comparisons should  only   be made within the same method.       BNP   Date/Time Value Ref Range Status   08/21/2023 08:29 AM 54 0 - 99 pg/mL Final     Comment:     .  <100 pg/mL - Heart failure unlikely  100-299 pg/mL - Intermediate probability of acute heart  .               failure exacerbation. Correlate with clinical  .               context and patient history.    >=300 pg/mL - Heart Failure likely. Correlate with clinical  .               context and patient history.  BNP testing is performed using different testing   methodology at [facility] than at other   [facility] hospitals. Direct result comparisons should   only be made within the same method.       HGBA1C   Date/Time Value Ref Range Status   02/26/2024 09:13 AM 7.8 see below % Final   11/08/2023 09:42 AM 8.0 see below % Final     LDLCALC   Date/Time Value Ref Range Status   03/19/2024 06:04 AM 43 <=99 mg/dL Final     Comment:                                 Near   Borderline      AGE      Desirable  Optimal    High     High     Very High     0-19 Y     0 - 109     ---    110-129   >/= 130     ----    20-24 Y     0 - 119     ---    120-159   >/= 160     ----      >24 Y     0 -  99   100-129  130-159   160-189     >/=190       VLDL   Date/Time Value Ref Range Status   03/19/2024 06:04 AM 28 0 - 40 mg/dL Final   11/11/2022 09:38 AM 52 0 - 40 mg/dL Final   09/22/2020 10:24 AM 44 0 - 40 mg/dL Final   06/26/2018 12:00 AM 33 0 - 40 mg/dL Final        Patient Medications:  Outpatient Encounter Medications as of 4/30/2024   Medication Sig Dispense Refill    cholecalciferol, vitamin D3, (VITAMIN D3 ORAL) Take 1 tablet by mouth once daily.      cloNIDine (Catapres) 0.2 mg tablet Take 1 tablet (0.2 mg) by mouth every 8 hours. 270 tablet 3    dilTIAZem CD (Cardizem CD) 240 mg 24 hr capsule Take 1 capsule (240 mg) by mouth once daily. 30 capsule 11    ezetimibe (Zetia) 10 mg tablet Take 1 tablet (10 mg) by mouth once daily. 90 tablet 3    glipiZIDE XL (Glucotrol XL) 5 mg 24  hr tablet Take 1 tablet (5 mg) by mouth once daily. (Patient taking differently: Take 1 tablet (5 mg) by mouth 2 times a day.) 90 tablet 3    insulin aspart, with niacinamide, (Fiasp FlexTouch U-100 Insulin) 100 unit/mL (3 mL) injection 3 times a day with meals. Sliding scale      insulin glargine (Basaglar KwikPen U-100 Insulin) 100 unit/mL (3 mL) pen Inject 45 Units under the skin 2 times a day. Take 45 Units in morning and 4 units at bedtime 81 mL 3    LORazepam (Ativan) 0.5 mg tablet Take 1 tablet (0.5 mg) by mouth every 6 hours if needed for anxiety for up to 7 days. 28 tablet 0    losartan (Cozaar) 100 mg tablet Take 1 tablet (100 mg) by mouth once daily. 90 tablet 3    metFORMIN (Glucophage) 500 mg tablet Take 2 tablets (1,000 mg) by mouth 2 times a day with meals. 360 tablet 3    metoprolol tartrate (Lopressor) 100 mg tablet Take 1 tablet (100 mg) by mouth 2 times a day. 60 tablet 1    OneTouch Delica Plus Lancet 33 gauge misc CHECK BLOOD SUGARS 3 TIMES A DAY      OneTouch Ultra Test strip CHECK BLOOD SUGAR THREE TIMES DAILY      Ozempic 0.25 mg or 0.5 mg (2 mg/3 mL) pen injector Inject 0.25 mg under the skin 1 (one) time per week.      rosuvastatin (Crestor) 5 mg tablet once daily at bedtime.       No facility-administered encounter medications on file as of 4/30/2024.       Physical Exam:  General: alert, oriented and in no acute distress  HEENT: NC/AT; EOMI; PERRLA, external ear is normal  Neck: supple; trachea midline; no masses; no JVD  Chest: clear breath sounds bilaterally; no wheezing  Cardio: regular rhythm, S1S2 normal, no murmurs  Abdomen: Soft, non-tender, non-distension, no organomegaly  Extremities: no clubbing/cyanosis/edema  Neuro: Grossly intact     Psychiatric: Normal mood and affect     Past Cardiology Results (Last 3 Years):  EKG:  No results found for this or any previous visit from the past 1095 days.    Echo:  Echo Results:  No results found for this or any previous visit from the past  365 days.     Cath:  No results found for this or any previous visit from the past 1095 days.    CV NCDR CATHPCI V5 COLLECTION FORM   Stress Test:  No results found for this or any previous visit from the past 1095 days.    Cardiac Imaging:  No results found for this or any previous visit from the past 1095 days.       Assessment/Plan     Mr. Av Bustos is a 68-year-old male with history of severe obesity, sleep apnea on CPAP, hypertension, diabetes mellitus type 2, possible obesity hypoventilation syndrome,  hyperlipidemia presented with syncope, sinus bradycardia, NERY, hyperkalemia, hyperglycemia, metabolic acidosis, hypomagnesemia. He states that he is still tachycardic (90bpm) even with top dose Metoprolol. He denies chest pain, shortness of breath, palpitations, leg edema, lightheadedness, headaches, fever, chills, orthopnea, paroxysmal nocturnal dyspnea or syncope.  EKG shows normal sinus rhythm with no signs of ACS.  The echocardiogram showed normal LVEF 60% with no wall motion abnormalities.  72h monitor (09/2023) showing baseline sinus rhythm, average HR 93bpm, <1% extra-beats.     Assessment     # Hypertension / White Coat Hypertension  - Patient returns today feeling well, but very concerned about his contantly elevated blood pressure measurements. Notably, he has White Coat Hypertension as well. Still complaining of elevated blood pressure. Notably, he reports that his BP has always been very high from his 20s, and sometimes associated with headache. He is compliant with the medication. Notably, he has been under a lot of stress, once he is taking care of his wife with cirrhosis. He brought today his home BP measurements, always elevated.   - Elevated BP in both arms, <10mmHg difference between them.  - Keep Metoprolol tartrate 100mg q12h.  -  Keep Cardizem CR 240mg daily  - Keep Losartan 100mg daily.  - Keep Clonidine to 0.2mg q8h.  - Would avoid the reinitiation of spironolactone, diltiazem, and  nadolol all given issues with hyperkalemia and bradycardia in the past. Patient also had cough with Lisinopril.    - Patient returns today with a more controlled BP. He brought a BP dairy with BP ~130-140s. He is feeling a little bit more tired that usual. He was suggested to avoid sedentary and be more active.  - Per prior note, if blood pressure continued to be uncontrolled following clonidine increase, home dose hydrochlorothiazide could be reinitiated.   - Lab tests are OK. K 3.8, Cre 1.05.  - Follow up in 6 months.     # Sinus tachycardia  - Echo showing LVEF 60%  - Resting heart rate of 110 bpm; patient remains asymptomatic, today improvement in HR 85bpm  - Would suggest to keep Metoprolol tartrate to 100mg BID  - Follow up in Cardiology clinic with Dr. George.  - EKG shows normal sinus tachycardia rhythm with no signs of ACS.  - The echocardiogram showed normal LVEF 60% with no wall motion abnormalities.  - 72h monitor (09/2023) showing baseline sinus rhythm, average HR 93bpm, <1% extra-beats.  - Keep Metoprolol tartrate 100mg q12h, Diltiazem 240mg daily.     # T2DM  - Keep insulin Lispro, Glipizide  - ISS  - Patient had episode of hypoglycemia after 80u insulin; would suggest to adjust it accordingly.     # Hyperlipidemia  - Keep atorvastatin 40mg daily, Ezetimibe 10mg daily     # BATSHEVA on CPAP  - Keep CPAP     We have discussed the most common side effects of the prescribed medications, indications, drug interactions, risks, complications, and alternatives of medications/therapeutics were explained and discussed. The patient has been requested to monitor closely for any untoward side effects or complications of medications. The patient has been strongly advised to be compliant with the recommendations, all the questions and concerns have been addressed. The patient has been also instructed to call, to return sooner or to go to the emergency department if symptoms persist or get worsen. The patient voiced  understanding and denies any further questions at this time.      This note was transcribed using the Dragon Dictation system. There may be grammatical, punctuation, or verbiage errors that occur with voice recognition programs.     Counseling greater than 50% of visit regarding all cardiac issues.     Thank you, Dr. Rodríguez, for allowing me to participate in the care of this patient. Please do not hesitate to contact me with any further questions or concerns.     Nikunj Bettencourt MD  Cardiology

## 2024-06-06 ENCOUNTER — TELEPHONE (OUTPATIENT)
Dept: CARDIOLOGY | Facility: CLINIC | Age: 69
End: 2024-06-06
Payer: MEDICARE

## 2024-06-06 NOTE — TELEPHONE ENCOUNTER
Pt calls and states that he has not been feeling well. He did make an appt for next Monday but would like to see if you could order some labs prior to? And if potassium could part of the panel that you order?

## 2024-06-07 ENCOUNTER — LAB (OUTPATIENT)
Dept: LAB | Facility: LAB | Age: 69
End: 2024-06-07
Payer: MEDICARE

## 2024-06-07 DIAGNOSIS — R00.1 BRADYCARDIA: ICD-10-CM

## 2024-06-07 LAB
ANION GAP SERPL CALC-SCNC: 12 MMOL/L (ref 10–20)
BUN SERPL-MCNC: 15 MG/DL (ref 6–23)
CALCIUM SERPL-MCNC: 9.1 MG/DL (ref 8.6–10.3)
CHLORIDE SERPL-SCNC: 103 MMOL/L (ref 98–107)
CO2 SERPL-SCNC: 27 MMOL/L (ref 21–32)
CREAT SERPL-MCNC: 1.12 MG/DL (ref 0.5–1.3)
EGFRCR SERPLBLD CKD-EPI 2021: 72 ML/MIN/1.73M*2
GLUCOSE SERPL-MCNC: 205 MG/DL (ref 74–99)
POTASSIUM SERPL-SCNC: 4.1 MMOL/L (ref 3.5–5.3)
SODIUM SERPL-SCNC: 138 MMOL/L (ref 136–145)

## 2024-06-07 PROCEDURE — 80048 BASIC METABOLIC PNL TOTAL CA: CPT

## 2024-06-07 PROCEDURE — 36415 COLL VENOUS BLD VENIPUNCTURE: CPT

## 2024-06-10 ENCOUNTER — OFFICE VISIT (OUTPATIENT)
Dept: CARDIOLOGY | Facility: CLINIC | Age: 69
End: 2024-06-10
Payer: MEDICARE

## 2024-06-10 VITALS
HEIGHT: 63 IN | BODY MASS INDEX: 44.3 KG/M2 | WEIGHT: 250 LBS | DIASTOLIC BLOOD PRESSURE: 80 MMHG | HEART RATE: 75 BPM | SYSTOLIC BLOOD PRESSURE: 124 MMHG | OXYGEN SATURATION: 95 %

## 2024-06-10 DIAGNOSIS — I10 HYPERTENSION, UNSPECIFIED TYPE: Primary | ICD-10-CM

## 2024-06-10 PROCEDURE — 99214 OFFICE O/P EST MOD 30 MIN: CPT | Performed by: STUDENT IN AN ORGANIZED HEALTH CARE EDUCATION/TRAINING PROGRAM

## 2024-06-10 PROCEDURE — 3008F BODY MASS INDEX DOCD: CPT | Performed by: STUDENT IN AN ORGANIZED HEALTH CARE EDUCATION/TRAINING PROGRAM

## 2024-06-10 PROCEDURE — 1036F TOBACCO NON-USER: CPT | Performed by: STUDENT IN AN ORGANIZED HEALTH CARE EDUCATION/TRAINING PROGRAM

## 2024-06-10 PROCEDURE — 3051F HG A1C>EQUAL 7.0%<8.0%: CPT | Performed by: STUDENT IN AN ORGANIZED HEALTH CARE EDUCATION/TRAINING PROGRAM

## 2024-06-10 PROCEDURE — 1159F MED LIST DOCD IN RCRD: CPT | Performed by: STUDENT IN AN ORGANIZED HEALTH CARE EDUCATION/TRAINING PROGRAM

## 2024-06-10 PROCEDURE — 3048F LDL-C <100 MG/DL: CPT | Performed by: STUDENT IN AN ORGANIZED HEALTH CARE EDUCATION/TRAINING PROGRAM

## 2024-06-10 PROCEDURE — 3074F SYST BP LT 130 MM HG: CPT | Performed by: STUDENT IN AN ORGANIZED HEALTH CARE EDUCATION/TRAINING PROGRAM

## 2024-06-10 PROCEDURE — 4010F ACE/ARB THERAPY RXD/TAKEN: CPT | Performed by: STUDENT IN AN ORGANIZED HEALTH CARE EDUCATION/TRAINING PROGRAM

## 2024-06-10 PROCEDURE — 1160F RVW MEDS BY RX/DR IN RCRD: CPT | Performed by: STUDENT IN AN ORGANIZED HEALTH CARE EDUCATION/TRAINING PROGRAM

## 2024-06-10 PROCEDURE — 3079F DIAST BP 80-89 MM HG: CPT | Performed by: STUDENT IN AN ORGANIZED HEALTH CARE EDUCATION/TRAINING PROGRAM

## 2024-06-10 RX ORDER — METOPROLOL SUCCINATE 100 MG/1
100 TABLET, EXTENDED RELEASE ORAL DAILY
Qty: 30 TABLET | Refills: 11 | Status: SHIPPED | OUTPATIENT
Start: 2024-06-10 | End: 2025-06-10

## 2024-06-10 NOTE — PROGRESS NOTES
Chief Complaint   Patient presents with    Discuss Heart Rate     HPI:  I  was requested by Dr. Rodríguez to evaluate this patient in consultation for cardiac assessment.     Mr. Av Bustos is a 68-year-old male with history of severe obesity, sleep apnea on CPAP, hypertension, diabetes mellitus type 2, possible obesity hypoventilation syndrome,  hyperlipidemia presented with syncope, sinus bradycardia, NERY, hyperkalemia, hyperglycemia, metabolic acidosis, hypomagnesemia. He states that he is still tachycardic (90bpm) even with top dose Metoprolol. He denies chest pain, shortness of breath, palpitations, leg edema, lightheadedness, headaches, fever, chills, orthopnea, paroxysmal nocturnal dyspnea or syncope.  EKG shows normal sinus rhythm with no signs of ACS.  The echocardiogram showed normal LVEF 60% with no wall motion abnormalities.  72h monitor (09/2023) showing baseline sinus rhythm, average HR 93bpm, <1% extra-beats.     Previously, patient was feeling fine, still complained of elevated blood pressure. Notably, he reports that his BP has always been very high from his 20s, and sometimes associated with headache. He is compliant with the medication.     Previously, he was feeling well, but very concerned about his constantly elevated blood pressure measurements. Notably, he has White Coat Hypertension as well. Still complaining of elevated blood pressure. Notably, he reports that his BP has always been very high from his 20s, and sometimes associated with headache. He is compliant with the medication. Notably, he has been under a lot of stress, once he is taking care of his wife with cirrhosis. He brought today his home BP measurements, always elevated.      Last appointment, he had a more controlled BP. He brought a BP dairy with BP ~130-140s. He was feeling a little bit more tired that usual. He was suggested to avoid sedentary and be more active.    Patient returned today as an extra appointment stating that he  is asymptomatic from the cardiovascular standpoint and that he is feeling fine. He has been compliant to the medication. He is concerned about his low heart rate ~37-24bpm overnight. Asymptomatic. He was taking Metoprolol tartrate 100mg BID, will switch to Metoprolol succinate 100mg daily.      Past Medical History  Past Medical History:   Diagnosis Date    Anxiety 30 YEARS AGO    Arthritis     Cataract     Chronic kidney disease 2023    Diabetes mellitus (Multi) 2010    Encounter for screening for malignant neoplasm of prostate     Prostate cancer screening    Essential (primary) hypertension 02/05/2018    HTN (hypertension), benign    Essential (primary) hypertension 05/07/2018    Benign essential hypertension    Essential (primary) hypertension 05/22/2018    Benign essential hypertension    Glaucoma     Headache     HL (hearing loss)     Personal history of other drug therapy 09/09/2019    History of influenza vaccination    Personal history of other endocrine, nutritional and metabolic disease 02/05/2018    History of type 2 diabetes mellitus    Personal history of other endocrine, nutritional and metabolic disease 05/07/2018    History of type 2 diabetes mellitus    Personal history of other endocrine, nutritional and metabolic disease 05/22/2018    History of type 2 diabetes mellitus    Personal history of other endocrine, nutritional and metabolic disease 09/10/2018    History of type 2 diabetes mellitus    Personal history of other endocrine, nutritional and metabolic disease 12/03/2018    History of type 2 diabetes mellitus    Personal history of other endocrine, nutritional and metabolic disease 03/20/2020    History of type 2 diabetes mellitus    Personal history of other endocrine, nutritional and metabolic disease 09/09/2019    History of type 2 diabetes mellitus    Personal history of other endocrine, nutritional and metabolic disease 06/26/2018    History of type 2 diabetes mellitus    Personal  history of other endocrine, nutritional and metabolic disease 06/03/2019    History of type 2 diabetes mellitus    Personal history of other endocrine, nutritional and metabolic disease 12/15/2020    History of type 2 diabetes mellitus    Visual impairment WEAR GLASSES FOR DISTANCE       Past Surgical History  Past Surgical History:   Procedure Laterality Date    COLONOSCOPY  08/10/2020    repeat 5 yrs    JOINT REPLACEMENT  TOTAL LEFT HIP  03/2021    OTHER SURGICAL HISTORY  08/16/2021    Hip replacement    WISDOM TOOTH EXTRACTION         Past Family History  Family History   Problem Relation Name Age of Onset    COPD Mother INES QUIL     Dementia Mother INES QUIL     Alcohol abuse Mother INES QUIL     Depression Mother INES QUIL     Bone cancer Father JESS  QUIL     Alcohol abuse Father JESS  QUIL     Cancer Father JESS  QUIL     Stroke Father JESS  QUIL        Allergy History  No Known Allergies    Past Social History  Social History     Socioeconomic History    Marital status:      Spouse name: Ann    Number of children: None    Years of education: None    Highest education level: None   Occupational History    None   Tobacco Use    Smoking status: Never     Passive exposure: Never    Smokeless tobacco: Never   Vaping Use    Vaping status: Never Used   Substance and Sexual Activity    Alcohol use: Not Currently    Drug use: Never    Sexual activity: Not Currently     Partners: Female     Birth control/protection: None   Other Topics Concern    None   Social History Narrative    None     Social Determinants of Health     Financial Resource Strain: Not on file   Food Insecurity: Not on file   Transportation Needs: Not on file   Physical Activity: Not on file   Stress: Not on file   Social Connections: Not on file   Intimate Partner Violence: Not on file   Housing Stability: Not on file       Social History     Tobacco Use   Smoking Status Never    Passive exposure: Never   Smokeless Tobacco  "Never       Objective Data:  Last Recorded Vitals:  Vitals:    06/10/24 1356   BP: 124/80   Pulse: 75   SpO2: 95%   Weight: 113 kg (250 lb)   Height: 1.6 m (5' 3\")       Last Labs:  CBC - 9/7/2023:  8:10 AM  7.6 11.9 232    37.8      CMP - 6/7/2024: 10:17 AM  9.1 6.6 14 --- 0.4   3.7 4.0 14 47      PTT - 8/21/2023:  8:29 AM  1.2   13.3 27     TROPHS   Date/Time Value Ref Range Status   08/21/2023 09:48 AM 4 0 - 20 ng/L Final     Comment:     .  Less than 99th percentile of normal range cutoff-  Female and children under 18 years old <14 ng/L; Male <21 ng/L: Negative  Repeat testing should be performed if clinically indicated.   .  Female and children under 18 years old 14-50 ng/L; Male 21-50 ng/L:  Consistent with possible cardiac damage and possible increased clinical   risk. Serial measurements may help to assess extent of myocardial damage.   .  >50 ng/L: Consistent with cardiac damage, increased clinical risk and  myocardial infarction. Serial measurements may help assess extent of   myocardial damage.   .   NOTE: Children less than 1 year old may have higher baseline troponin   levels and results should be interpreted in conjunction with the overall   clinical context.   .  NOTE: Troponin I testing is performed using a different   testing methodology at [facility] than at other   [facility]. Direct result comparisons should only   be made within the same method.     08/21/2023 08:29 AM 5 0 - 20 ng/L Final     Comment:     .  Less than 99th percentile of normal range cutoff-  Female and children under 18 years old <14 ng/L; Male <21 ng/L: Negative  Repeat testing should be performed if clinically indicated.   .  Female and children under 18 years old 14-50 ng/L; Male 21-50 ng/L:  Consistent with possible cardiac damage and possible increased clinical   risk. Serial measurements may help to assess extent of myocardial damage.   .  >50 ng/L: Consistent with cardiac damage, increased clinical " risk and  myocardial infarction. Serial measurements may help assess extent of   myocardial damage.   .   NOTE: Children less than 1 year old may have higher baseline troponin   levels and results should be interpreted in conjunction with the overall   clinical context.   .  NOTE: Troponin I testing is performed using a different   testing methodology at [facility] than at other   [facility]. Direct result comparisons should only   be made within the same method.       BNP   Date/Time Value Ref Range Status   08/21/2023 08:29 AM 54 0 - 99 pg/mL Final     Comment:     .  <100 pg/mL - Heart failure unlikely  100-299 pg/mL - Intermediate probability of acute heart  .               failure exacerbation. Correlate with clinical  .               context and patient history.    >=300 pg/mL - Heart Failure likely. Correlate with clinical  .               context and patient history.  BNP testing is performed using different testing   methodology at [facility] than at other   [facility]. Direct result comparisons should   only be made within the same method.       HGBA1C   Date/Time Value Ref Range Status   02/26/2024 09:13 AM 7.8 see below % Final   11/08/2023 09:42 AM 8.0 see below % Final     LDLCALC   Date/Time Value Ref Range Status   03/19/2024 06:04 AM 43 <=99 mg/dL Final     Comment:                                 Near   Borderline      AGE      Desirable  Optimal    High     High     Very High     0-19 Y     0 - 109     ---    110-129   >/= 130     ----    20-24 Y     0 - 119     ---    120-159   >/= 160     ----      >24 Y     0 -  99   100-129  130-159   160-189     >/=190       VLDL   Date/Time Value Ref Range Status   03/19/2024 06:04 AM 28 0 - 40 mg/dL Final   11/11/2022 09:38 AM 52 0 - 40 mg/dL Final   09/22/2020 10:24 AM 44 0 - 40 mg/dL Final   06/26/2018 12:00 AM 33 0 - 40 mg/dL Final        Patient Medications:  Outpatient Encounter Medications as of 6/10/2024    Medication Sig Dispense Refill    cholecalciferol, vitamin D3, (VITAMIN D3 ORAL) Take 1 tablet by mouth once daily.      cloNIDine (Catapres) 0.2 mg tablet Take 1 tablet (0.2 mg) by mouth every 8 hours. 270 tablet 3    dilTIAZem CD (Cardizem CD) 240 mg 24 hr capsule Take 1 capsule (240 mg) by mouth once daily. 90 capsule 3    ezetimibe (Zetia) 10 mg tablet Take 1 tablet (10 mg) by mouth once daily. 90 tablet 3    glipiZIDE XL (Glucotrol XL) 5 mg 24 hr tablet Take 1 tablet (5 mg) by mouth once daily. (Patient taking differently: Take 1 tablet (5 mg) by mouth 2 times a day.) 90 tablet 3    insulin aspart, with niacinamide, (Fiasp FlexTouch U-100 Insulin) 100 unit/mL (3 mL) injection 3 times a day with meals. Sliding scale      insulin glargine (Basaglar KwikPen U-100 Insulin) 100 unit/mL (3 mL) pen Inject 45 Units under the skin 2 times a day. Take 45 Units in morning and 4 units at bedtime 81 mL 3    losartan (Cozaar) 100 mg tablet Take 1 tablet (100 mg) by mouth once daily. 90 tablet 3    metFORMIN (Glucophage) 500 mg tablet Take 2 tablets (1,000 mg) by mouth 2 times a day with meals. 360 tablet 3    metoprolol tartrate (Lopressor) 100 mg tablet Take 1 tablet (100 mg) by mouth 2 times a day. 180 tablet 3    OneTouch Delica Plus Lancet 33 gauge misc CHECK BLOOD SUGARS 3 TIMES A DAY      OneTouch Ultra Test strip CHECK BLOOD SUGAR THREE TIMES DAILY      Ozempic 0.25 mg or 0.5 mg (2 mg/3 mL) pen injector Inject 0.25 mg under the skin 1 (one) time per week.      rosuvastatin (Crestor) 5 mg tablet once daily at bedtime.      LORazepam (Ativan) 0.5 mg tablet Take 1 tablet (0.5 mg) by mouth every 6 hours if needed for anxiety for up to 7 days. 28 tablet 0     No facility-administered encounter medications on file as of 6/10/2024.       Physical Exam:  General: alert, oriented and in no acute distress  HEENT: NC/AT; EOMI; PERRLA, external ear is normal  Neck: supple; trachea midline; no masses; no JVD  Chest: clear breath  sounds bilaterally; no wheezing  Cardio: regular rhythm, S1S2 normal, no murmurs  Abdomen: Soft, non-tender, non-distension, no organomegaly  Extremities: no clubbing/cyanosis/edema  Neuro: Grossly intact     Psychiatric: Normal mood and affect     Past Cardiology Results (Last 3 Years):  EKG:  No results found for this or any previous visit from the past 1095 days.    Echo:  Echo Results:  No results found for this or any previous visit from the past 365 days.     Cath:  No results found for this or any previous visit from the past 1095 days.    CV NCDR CATHPCI V5 COLLECTION FORM   Stress Test:  No results found for this or any previous visit from the past 1095 days.    Cardiac Imaging:  No results found for this or any previous visit from the past 1095 days.       Assessment/Plan   ..In summary, Mr. Av Bustos is a 68-year-old male with history of severe obesity, sleep apnea on CPAP, hypertension, diabetes mellitus type 2, possible obesity hypoventilation syndrome,  hyperlipidemia presented with syncope, sinus bradycardia, NERY, hyperkalemia, hyperglycemia, metabolic acidosis, hypomagnesemia. He states that he is still tachycardic (90bpm) even with top dose Metoprolol. He denies chest pain, shortness of breath, palpitations, leg edema, lightheadedness, headaches, fever, chills, orthopnea, paroxysmal nocturnal dyspnea or syncope.  EKG shows normal sinus rhythm with no signs of ACS.  The echocardiogram showed normal LVEF 60% with no wall motion abnormalities.  72h monitor (09/2023) showing baseline sinus rhythm, average HR 93bpm, <1% extra-beats.     Assessment     # Hypertension / White Coat Hypertension  - Patient returns today feeling well, but very concerned about his contantly elevated blood pressure measurements. Notably, he has White Coat Hypertension as well. Still complaining of elevated blood pressure. Notably, he reports that his BP has always been very high from his 20s, and sometimes associated with  headache. He is compliant with the medication. Notably, he has been under a lot of stress, once he is taking care of his wife with cirrhosis. He brought today his home BP measurements, always elevated.   - Elevated BP in both arms, <10mmHg difference between them.  -  Keep Cardizem CR 240mg daily  - Keep Losartan 100mg daily.  - Keep Clonidine to 0.2mg q8h.  - Keep Metoprolol - will switch to succinate 100mg daily.  - Would avoid the reinitiation of spironolactone, diltiazem, and nadolol all given issues with hyperkalemia and bradycardia in the past. Patient also had cough with Lisinopril.    - Patient returns today with a more controlled BP. He brought a BP dairy with BP ~130-140s. He is feeling a little bit more tired that usual. He was suggested to avoid sedentary and be more active.  - Per prior note, if blood pressure continued to be uncontrolled following clonidine increase, home dose hydrochlorothiazide could be reinitiated.   - Lab tests are OK. K 3.8, Cre 1.05.  - Patient returned today as an extra appointment stating that he is asymptomatic from the cardiovascular standpoint and that he is feeling fine. He has been compliant to the medication. He is concerned about his low heart rate ~37-24bpm overnight. Asymptomatic. He was taking Metoprolol tartrate 100mg BID, will switch to Metoprolol succinate 100mg daily.  - Follow up in 6 months.     # Sinus tachycardia  - Echo showing LVEF 60%  - Resting heart rate of 110 bpm; patient remains asymptomatic, today improvement in HR 85bpm  - Would suggest to keep Metoprolol tartrate to 100mg BID  - Follow up in Cardiology clinic with Dr. George.  - EKG shows normal sinus tachycardia rhythm with no signs of ACS.  - The echocardiogram showed normal LVEF 60% with no wall motion abnormalities.  - 72h monitor (09/2023) showing baseline sinus rhythm, average HR 93bpm, <1% extra-beats.  - Keep Metoprolol tartrate 100mg q12h, Diltiazem 240mg daily.     # T2DM  - Keep insulin  Lispro, Glipizide  - ISS  - Patient had episode of hypoglycemia after 80u insulin; would suggest to adjust it accordingly.     # Hyperlipidemia  - Keep atorvastatin 40mg daily, Ezetimibe 10mg daily     # BATSHEVA on CPAP  - Keep CPAP     We have discussed the most common side effects of the prescribed medications, indications, drug interactions, risks, complications, and alternatives of medications/therapeutics were explained and discussed. The patient has been requested to monitor closely for any untoward side effects or complications of medications. The patient has been strongly advised to be compliant with the recommendations, all the questions and concerns have been addressed. The patient has been also instructed to call, to return sooner or to go to the emergency department if symptoms persist or get worsen. The patient voiced understanding and denies any further questions at this time.      This note was transcribed using the Dragon Dictation system. There may be grammatical, punctuation, or verbiage errors that occur with voice recognition programs.     Counseling greater than 50% of visit regarding all cardiac issues.     Thank you, Dr. Rodríguez, for allowing me to participate in the care of this patient. Please do not hesitate to contact me with any further questions or concerns.     Nikunj Bettencourt MD  Cardiology

## 2024-06-28 ENCOUNTER — APPOINTMENT (OUTPATIENT)
Dept: PRIMARY CARE | Facility: CLINIC | Age: 69
End: 2024-06-28
Payer: MEDICARE

## 2024-06-28 VITALS
SYSTOLIC BLOOD PRESSURE: 150 MMHG | HEIGHT: 63 IN | WEIGHT: 248.4 LBS | OXYGEN SATURATION: 95 % | HEART RATE: 92 BPM | BODY MASS INDEX: 44.01 KG/M2 | DIASTOLIC BLOOD PRESSURE: 100 MMHG

## 2024-06-28 DIAGNOSIS — G89.29 CHRONIC PAIN OF RIGHT KNEE: ICD-10-CM

## 2024-06-28 DIAGNOSIS — F41.1 GENERALIZED ANXIETY DISORDER: ICD-10-CM

## 2024-06-28 DIAGNOSIS — N18.31 TYPE 2 DIABETES MELLITUS WITH STAGE 3A CHRONIC KIDNEY DISEASE, WITHOUT LONG-TERM CURRENT USE OF INSULIN (MULTI): ICD-10-CM

## 2024-06-28 DIAGNOSIS — E11.9 TYPE 2 DIABETES MELLITUS WITHOUT COMPLICATIONS (MULTI): ICD-10-CM

## 2024-06-28 DIAGNOSIS — M25.561 CHRONIC PAIN OF RIGHT KNEE: ICD-10-CM

## 2024-06-28 DIAGNOSIS — M1A.00X0 IDIOPATHIC CHRONIC GOUT WITHOUT TOPHUS, UNSPECIFIED SITE: ICD-10-CM

## 2024-06-28 DIAGNOSIS — M18.11 ARTHRITIS OF CARPOMETACARPAL (CMC) JOINT OF RIGHT THUMB: ICD-10-CM

## 2024-06-28 DIAGNOSIS — Z12.5 SCREENING FOR PROSTATE CANCER: ICD-10-CM

## 2024-06-28 DIAGNOSIS — E66.01 OBESITY, MORBID, BMI 40.0-49.9 (MULTI): ICD-10-CM

## 2024-06-28 DIAGNOSIS — N18.31 STAGE 3A CHRONIC KIDNEY DISEASE (MULTI): ICD-10-CM

## 2024-06-28 DIAGNOSIS — G47.33 OBSTRUCTIVE SLEEP APNEA, ADULT: ICD-10-CM

## 2024-06-28 DIAGNOSIS — Z00.00 ROUTINE GENERAL MEDICAL EXAMINATION AT HEALTH CARE FACILITY: Primary | ICD-10-CM

## 2024-06-28 DIAGNOSIS — I10 BENIGN ESSENTIAL HYPERTENSION: ICD-10-CM

## 2024-06-28 DIAGNOSIS — E78.5 DYSLIPIDEMIA: ICD-10-CM

## 2024-06-28 DIAGNOSIS — E11.22 TYPE 2 DIABETES MELLITUS WITH STAGE 3A CHRONIC KIDNEY DISEASE, WITHOUT LONG-TERM CURRENT USE OF INSULIN (MULTI): ICD-10-CM

## 2024-06-28 RX ORDER — INSULIN GLARGINE 100 [IU]/ML
45 INJECTION, SOLUTION SUBCUTANEOUS 2 TIMES DAILY
Qty: 81 ML | Refills: 3 | Status: SHIPPED | OUTPATIENT
Start: 2024-06-28 | End: 2025-06-28

## 2024-06-28 RX ORDER — TRIAMCINOLONE ACETONIDE 40 MG/ML
40 INJECTION, SUSPENSION INTRA-ARTICULAR; INTRAMUSCULAR
Status: COMPLETED | OUTPATIENT
Start: 2024-06-28 | End: 2024-06-28

## 2024-06-28 RX ORDER — MAGNESIUM CHLORIDE 71.5 G/G
1 TABLET ORAL
COMMUNITY
Start: 2024-04-05

## 2024-06-28 ASSESSMENT — ENCOUNTER SYMPTOMS
DEPRESSION: 0
DIFFICULTY URINATING: 0
OCCASIONAL FEELINGS OF UNSTEADINESS: 0
WHEEZING: 0
DIARRHEA: 0
CONSTIPATION: 0
HEADACHES: 0
COUGH: 0
ABDOMINAL PAIN: 0
TROUBLE SWALLOWING: 0
PALPITATIONS: 0
ACTIVITY CHANGE: 0
ARTHRALGIAS: 1
ADENOPATHY: 0
VOMITING: 0
NAUSEA: 0
LIGHT-HEADEDNESS: 0
FATIGUE: 0
DYSPHORIC MOOD: 0
NERVOUS/ANXIOUS: 0
DIZZINESS: 0
RHINORRHEA: 0
APPETITE CHANGE: 0
SHORTNESS OF BREATH: 0
NUMBNESS: 0
UNEXPECTED WEIGHT CHANGE: 0
SLEEP DISTURBANCE: 0
LOSS OF SENSATION IN FEET: 0
ABDOMINAL DISTENTION: 0

## 2024-06-28 ASSESSMENT — PATIENT HEALTH QUESTIONNAIRE - PHQ9
SUM OF ALL RESPONSES TO PHQ9 QUESTIONS 1 AND 2: 0
1. LITTLE INTEREST OR PLEASURE IN DOING THINGS: NOT AT ALL
2. FEELING DOWN, DEPRESSED OR HOPELESS: NOT AT ALL

## 2024-06-28 ASSESSMENT — ACTIVITIES OF DAILY LIVING (ADL)
DOING_HOUSEWORK: INDEPENDENT
BATHING: INDEPENDENT
MANAGING_FINANCES: INDEPENDENT
DRESSING: INDEPENDENT
GROCERY_SHOPPING: INDEPENDENT
TAKING_MEDICATION: INDEPENDENT

## 2024-06-28 NOTE — PROGRESS NOTES
Subjective   Reason for Visit: Av Bustos is an 68 y.o. male here for a Medicare Wellness visit.     Past Medical, Surgical, and Family History reviewed and updated in chart.    Reviewed all medications by prescribing practitioner or clinical pharmacist (such as prescriptions, OTCs, herbal therapies and supplements) and documented in the medical record.    HPI  No low blood sugars since last OV, seen opthalmology in the past year, and no numbness or tingling in feet, skin normal.  No headache, chest pain, shortness of breath, dizziness, lightheadedness, or edema  Follows with cardiology who manages BP and endocrinology for diabetes  Patient has been using their CPAP nightly and is experiencing restful sleep, no morning headaches, no witnessed snoring, and notices a difference if they do not use the device.  Seen Cardiology due to low HR, changed medicines for BP and low HR, HBP over 140/90 at times  Stopped semaglutide last week due to cost of medicine, has follow-up next month  Had knee injection in February, helped, started to hurt in the past month  Right thumb pain for the past year, getting worse  Joint Injection Large/Arthrocentesis: R knee on 6/28/2024 1:58 PM  Indications: pain  Details: 25 G needle, anterolateral approach  Medications: 40 mg triamcinolone acetonide 40 mg/mL  Outcome: tolerated well, no immediate complications    2 cc 0.5% bupivacaine used as well.  Procedure, treatment alternatives, risks and benefits explained, specific risks discussed. Consent was given by the patient.             Patient Care Team:  Matthew Rodríguez MD as PCP - General (Family Medicine)  Dmitriy Perry MD as PCP - [facility] Attributed Provider     Review of Systems   Constitutional:  Negative for activity change, appetite change, fatigue and unexpected weight change.   HENT:  Negative for ear pain, nosebleeds, rhinorrhea, sneezing and trouble swallowing.    Respiratory:  Negative for cough, shortness of  "breath and wheezing.    Cardiovascular:  Negative for chest pain, palpitations and leg swelling.   Gastrointestinal:  Negative for abdominal distention, abdominal pain, constipation, diarrhea, nausea and vomiting.   Genitourinary:  Negative for difficulty urinating.   Musculoskeletal:  Positive for arthralgias.   Skin:  Negative for rash.   Neurological:  Negative for dizziness, light-headedness, numbness and headaches.   Hematological:  Negative for adenopathy.   Psychiatric/Behavioral:  Negative for behavioral problems, dysphoric mood and sleep disturbance. The patient is not nervous/anxious.    All other systems reviewed and are negative.      Objective   Vitals:  BP (!) 150/100   Pulse 92   Ht 1.6 m (5' 3\")   Wt 113 kg (248 lb 6.4 oz)   SpO2 95%   BMI 44.00 kg/m²       Physical Exam  Vitals and nursing note reviewed.   Constitutional:       Appearance: Normal appearance.   HENT:      Head: Normocephalic and atraumatic.      Right Ear: Tympanic membrane, ear canal and external ear normal.      Left Ear: Tympanic membrane, ear canal and external ear normal.      Nose: Nose normal.      Mouth/Throat:      Mouth: Mucous membranes are moist.      Pharynx: Oropharynx is clear.   Cardiovascular:      Rate and Rhythm: Normal rate and regular rhythm.      Pulses: Normal pulses.      Heart sounds: Normal heart sounds.   Pulmonary:      Effort: Pulmonary effort is normal.      Breath sounds: Normal breath sounds.   Musculoskeletal:      Cervical back: Normal range of motion and neck supple.   Neurological:      Mental Status: He is alert.   Psychiatric:         Mood and Affect: Mood normal.         Behavior: Behavior normal.         Assessment/Plan   Problem List Items Addressed This Visit       Anxiety disorder    Current Assessment & Plan     Stable currently.  Stressed about taking care of his wife         Relevant Orders    Follow Up In Primary Care - Established    Benign essential hypertension    Current " Assessment & Plan     Blood pressure above goal, following with cardiology who is currently adjusting medications, home blood pressures above 140/90.         Relevant Orders    Follow Up In Primary Care - Established    Type 2 diabetes mellitus without complications (Multi)    Current Assessment & Plan     Following with endocrinology who is currently adjusting medications, patient self discontinued his GLP-1 due to cost of medication.  Has follow-up appointment again in the next month with endocrinologist, continue with current treatment plan.         Relevant Medications    insulin glargine (Basaglar KwikPen U-100 Insulin) 100 unit/mL (3 mL) pen    Other Relevant Orders    Follow Up In Primary Care - Established    Dyslipidemia    Current Assessment & Plan     Tolerating low-dose rosuvastatin, lipid profile last done was acceptable, no change.         Relevant Orders    Follow Up In Primary Care - Established    Gout    Relevant Orders    Follow Up In Primary Care - Established    Obesity, morbid, BMI 40.0-49.9 (Multi)    Current Assessment & Plan     Counseled about diet and exercise         Relevant Orders    Follow Up In Primary Care - Established    Obstructive sleep apnea, adult    Current Assessment & Plan     Patient has been using their CPAP nightly and is experiencing restful sleep, no morning headaches, no witnessed snoring, and notices a difference if they do not use the device.           Relevant Orders    Follow Up In Primary Care - Established    Stage 3a chronic kidney disease (Multi)    Current Assessment & Plan     Need to control blood pressure better, cardiology currently adjusting medication.         Relevant Orders    Follow Up In Primary Care - Established     Other Visit Diagnoses       Routine general medical examination at health care facility    -  Primary    Relevant Orders    Follow Up In Primary Care - Established    Type 2 diabetes mellitus with stage 3a chronic kidney disease,  without long-term current use of insulin (Multi)        Relevant Medications    insulin glargine (Basaglar KwikPen U-100 Insulin) 100 unit/mL (3 mL) pen    Other Relevant Orders    Follow Up In Primary Care - Established    Screening for prostate cancer        Relevant Orders    Follow Up In Primary Care - Established    Chronic pain of right knee        Injected today.    Arthritis of carpometacarpal (CMC) joint of right thumb        Continue with thumb spica splint.

## 2024-06-28 NOTE — ASSESSMENT & PLAN NOTE
Blood pressure above goal, following with cardiology who is currently adjusting medications, home blood pressures above 140/90.

## 2024-06-28 NOTE — ASSESSMENT & PLAN NOTE
Following with endocrinology who is currently adjusting medications, patient self discontinued his GLP-1 due to cost of medication.  Has follow-up appointment again in the next month with endocrinologist, continue with current treatment plan.

## 2024-07-17 ENCOUNTER — LAB (OUTPATIENT)
Dept: LAB | Facility: LAB | Age: 69
End: 2024-07-17
Payer: MEDICARE

## 2024-07-17 DIAGNOSIS — E11.65 TYPE 2 DIABETES MELLITUS WITH HYPERGLYCEMIA (MULTI): Primary | ICD-10-CM

## 2024-07-17 DIAGNOSIS — I10 ESSENTIAL (PRIMARY) HYPERTENSION: ICD-10-CM

## 2024-07-17 DIAGNOSIS — E78.2 MIXED HYPERLIPIDEMIA: ICD-10-CM

## 2024-07-17 DIAGNOSIS — Z79.899 OTHER LONG TERM (CURRENT) DRUG THERAPY: ICD-10-CM

## 2024-07-17 LAB
ANION GAP SERPL CALC-SCNC: 12 MMOL/L (ref 10–20)
BUN SERPL-MCNC: 15 MG/DL (ref 6–23)
CALCIUM SERPL-MCNC: 9.1 MG/DL (ref 8.6–10.3)
CHLORIDE SERPL-SCNC: 103 MMOL/L (ref 98–107)
CO2 SERPL-SCNC: 26 MMOL/L (ref 21–32)
CREAT SERPL-MCNC: 0.96 MG/DL (ref 0.5–1.3)
EGFRCR SERPLBLD CKD-EPI 2021: 87 ML/MIN/1.73M*2
EST. AVERAGE GLUCOSE BLD GHB EST-MCNC: 200 MG/DL
GLUCOSE SERPL-MCNC: 201 MG/DL (ref 74–99)
HBA1C MFR BLD: 8.6 %
POTASSIUM SERPL-SCNC: 3.9 MMOL/L (ref 3.5–5.3)
SODIUM SERPL-SCNC: 137 MMOL/L (ref 136–145)

## 2024-07-17 PROCEDURE — 83036 HEMOGLOBIN GLYCOSYLATED A1C: CPT

## 2024-07-17 PROCEDURE — 82043 UR ALBUMIN QUANTITATIVE: CPT

## 2024-07-17 PROCEDURE — 80048 BASIC METABOLIC PNL TOTAL CA: CPT

## 2024-07-17 PROCEDURE — 82570 ASSAY OF URINE CREATININE: CPT

## 2024-07-17 PROCEDURE — 36415 COLL VENOUS BLD VENIPUNCTURE: CPT

## 2024-07-18 LAB
CREAT UR-MCNC: 99.8 MG/DL (ref 20–370)
MICROALBUMIN UR-MCNC: 375.9 MG/L
MICROALBUMIN/CREAT UR: 376.7 UG/MG CREAT

## 2024-08-29 ENCOUNTER — APPOINTMENT (OUTPATIENT)
Dept: CARDIOLOGY | Facility: CLINIC | Age: 69
End: 2024-08-29
Payer: MEDICARE

## 2024-08-29 VITALS
DIASTOLIC BLOOD PRESSURE: 100 MMHG | SYSTOLIC BLOOD PRESSURE: 162 MMHG | OXYGEN SATURATION: 96 % | WEIGHT: 248.1 LBS | HEART RATE: 82 BPM | BODY MASS INDEX: 43.96 KG/M2 | HEIGHT: 63 IN

## 2024-08-29 DIAGNOSIS — E11.22 TYPE 2 DIABETES MELLITUS WITH STAGE 3A CHRONIC KIDNEY DISEASE, WITHOUT LONG-TERM CURRENT USE OF INSULIN (MULTI): ICD-10-CM

## 2024-08-29 DIAGNOSIS — N18.31 TYPE 2 DIABETES MELLITUS WITH STAGE 3A CHRONIC KIDNEY DISEASE, WITHOUT LONG-TERM CURRENT USE OF INSULIN (MULTI): ICD-10-CM

## 2024-08-29 DIAGNOSIS — R55 SYNCOPE, UNSPECIFIED SYNCOPE TYPE: ICD-10-CM

## 2024-08-29 DIAGNOSIS — I10 HYPERTENSION, UNSPECIFIED TYPE: Primary | ICD-10-CM

## 2024-08-29 DIAGNOSIS — I1A.0 RESISTANT HYPERTENSION: ICD-10-CM

## 2024-08-29 PROCEDURE — 3062F POS MACROALBUMINURIA REV: CPT | Performed by: STUDENT IN AN ORGANIZED HEALTH CARE EDUCATION/TRAINING PROGRAM

## 2024-08-29 PROCEDURE — 3008F BODY MASS INDEX DOCD: CPT | Performed by: STUDENT IN AN ORGANIZED HEALTH CARE EDUCATION/TRAINING PROGRAM

## 2024-08-29 PROCEDURE — 1160F RVW MEDS BY RX/DR IN RCRD: CPT | Performed by: STUDENT IN AN ORGANIZED HEALTH CARE EDUCATION/TRAINING PROGRAM

## 2024-08-29 PROCEDURE — 3077F SYST BP >= 140 MM HG: CPT | Performed by: STUDENT IN AN ORGANIZED HEALTH CARE EDUCATION/TRAINING PROGRAM

## 2024-08-29 PROCEDURE — 1036F TOBACCO NON-USER: CPT | Performed by: STUDENT IN AN ORGANIZED HEALTH CARE EDUCATION/TRAINING PROGRAM

## 2024-08-29 PROCEDURE — 3052F HG A1C>EQUAL 8.0%<EQUAL 9.0%: CPT | Performed by: STUDENT IN AN ORGANIZED HEALTH CARE EDUCATION/TRAINING PROGRAM

## 2024-08-29 PROCEDURE — 4010F ACE/ARB THERAPY RXD/TAKEN: CPT | Performed by: STUDENT IN AN ORGANIZED HEALTH CARE EDUCATION/TRAINING PROGRAM

## 2024-08-29 PROCEDURE — 99215 OFFICE O/P EST HI 40 MIN: CPT | Performed by: STUDENT IN AN ORGANIZED HEALTH CARE EDUCATION/TRAINING PROGRAM

## 2024-08-29 PROCEDURE — 3048F LDL-C <100 MG/DL: CPT | Performed by: STUDENT IN AN ORGANIZED HEALTH CARE EDUCATION/TRAINING PROGRAM

## 2024-08-29 PROCEDURE — 1159F MED LIST DOCD IN RCRD: CPT | Performed by: STUDENT IN AN ORGANIZED HEALTH CARE EDUCATION/TRAINING PROGRAM

## 2024-08-29 PROCEDURE — 3080F DIAST BP >= 90 MM HG: CPT | Performed by: STUDENT IN AN ORGANIZED HEALTH CARE EDUCATION/TRAINING PROGRAM

## 2024-08-29 RX ORDER — HYDROCHLOROTHIAZIDE 25 MG/1
25 TABLET ORAL DAILY
Qty: 30 TABLET | Refills: 11 | Status: SHIPPED | OUTPATIENT
Start: 2024-08-29 | End: 2025-08-29

## 2024-08-29 NOTE — PROGRESS NOTES
Chief Complaint   Patient presents with    4 month f/u     HPI:  I  was requested by Dr. Rodríguez to evaluate this patient in consultation for cardiac assessment.     Mr. Av Bustos is a 68-year-old male with history of severe obesity, sleep apnea on CPAP, hypertension, diabetes mellitus type 2, possible obesity hypoventilation syndrome,  hyperlipidemia presented with syncope, sinus bradycardia, NERY, hyperkalemia, hyperglycemia, metabolic acidosis, hypomagnesemia. He states that he is still tachycardic (90bpm) even with top dose Metoprolol. He denies chest pain, shortness of breath, palpitations, leg edema, lightheadedness, headaches, fever, chills, orthopnea, paroxysmal nocturnal dyspnea or syncope.  EKG shows normal sinus rhythm with no signs of ACS.  The echocardiogram showed normal LVEF 60% with no wall motion abnormalities.  72h monitor (09/2023) showing baseline sinus rhythm, average HR 93bpm, <1% extra-beats.     Previously, patient was feeling fine, still complained of elevated blood pressure. Notably, he reports that his BP has always been very high from his 20s, and sometimes associated with headache. He is compliant with the medication.     Previously, he was feeling well, but very concerned about his constantly elevated blood pressure measurements. Notably, he has White Coat Hypertension as well. Still complaining of elevated blood pressure. Notably, he reports that his BP has always been very high from his 20s, and sometimes associated with headache. He is compliant with the medication. Notably, he has been under a lot of stress, once he is taking care of his wife with cirrhosis. He brought today his home BP measurements, always elevated.      Previously, he had a more controlled BP. He brought a BP dairy with BP ~130-140s. He was feeling a little bit more tired that usual. He was suggested to avoid sedentary and be more active.     Last appointment, he stated that he was asymptomatic from the  cardiovascular standpoint and that he was feeling fine. He has been compliant to the medication. He was concerned about his low heart rate ~37-24bpm overnight. Asymptomatic. He was taking Metoprolol tartrate 100mg BID, will switch to Metoprolol succinate 100mg daily.      Past Medical History  Past Medical History:   Diagnosis Date    Anxiety 30 YEARS AGO    Arthritis     Cataract     Chronic kidney disease 2023    Diabetes mellitus (Multi) 2010    Encounter for screening for malignant neoplasm of prostate     Prostate cancer screening    Essential (primary) hypertension 02/05/2018    HTN (hypertension), benign    Essential (primary) hypertension 05/07/2018    Benign essential hypertension    Essential (primary) hypertension 05/22/2018    Benign essential hypertension    Glaucoma     Headache     HL (hearing loss)     Personal history of other drug therapy 09/09/2019    History of influenza vaccination    Personal history of other endocrine, nutritional and metabolic disease 02/05/2018    History of type 2 diabetes mellitus    Personal history of other endocrine, nutritional and metabolic disease 05/07/2018    History of type 2 diabetes mellitus    Personal history of other endocrine, nutritional and metabolic disease 05/22/2018    History of type 2 diabetes mellitus    Personal history of other endocrine, nutritional and metabolic disease 09/10/2018    History of type 2 diabetes mellitus    Personal history of other endocrine, nutritional and metabolic disease 12/03/2018    History of type 2 diabetes mellitus    Personal history of other endocrine, nutritional and metabolic disease 03/20/2020    History of type 2 diabetes mellitus    Personal history of other endocrine, nutritional and metabolic disease 09/09/2019    History of type 2 diabetes mellitus    Personal history of other endocrine, nutritional and metabolic disease 06/26/2018    History of type 2 diabetes mellitus    Personal history of other endocrine,  "nutritional and metabolic disease 06/03/2019    History of type 2 diabetes mellitus    Personal history of other endocrine, nutritional and metabolic disease 12/15/2020    History of type 2 diabetes mellitus    Visual impairment WEAR GLASSES FOR DISTANCE       Past Surgical History  Past Surgical History:   Procedure Laterality Date    COLONOSCOPY  08/10/2020    repeat 5 yrs    JOINT REPLACEMENT  TOTAL LEFT HIP  03/2021    OTHER SURGICAL HISTORY  08/16/2021    Hip replacement    WISDOM TOOTH EXTRACTION         Past Family History  Family History   Problem Relation Name Age of Onset    COPD Mother INES QUIL     Dementia Mother INES QUIL     Alcohol abuse Mother INES QUIL     Depression Mother INES QUIL     Bone cancer Father JESS  QUIL     Alcohol abuse Father JESS  QUIL     Cancer Father JESS  QUIL     Stroke Father JESS  QUIL        Allergy History  No Known Allergies    Past Social History  Social History     Socioeconomic History    Marital status:      Spouse name: Ann   Tobacco Use    Smoking status: Never     Passive exposure: Never    Smokeless tobacco: Never   Vaping Use    Vaping status: Never Used   Substance and Sexual Activity    Alcohol use: Not Currently    Drug use: Never    Sexual activity: Not Currently     Partners: Female     Birth control/protection: None       Social History     Tobacco Use   Smoking Status Never    Passive exposure: Never   Smokeless Tobacco Never     Objective Data:  Last Recorded Vitals:  Vitals:    08/29/24 1344   BP: (!) 162/100   Pulse: 82   SpO2: 96%   Weight: 113 kg (248 lb 1.6 oz)   Height: 1.6 m (5' 3\")       Last Labs:  CBC - 9/7/2023:  8:10 AM  7.6 11.9 232    37.8      CMP - 7/17/2024:  9:50 AM  9.1 6.6 14 --- 0.4   _ 4.0 14 47      PTT - No results in last year.  _   _ _     TROPHS   Date/Time Value Ref Range Status   08/21/2023 09:48 AM 4 0 - 20 ng/L Final     Comment:     .  Less than 99th percentile of normal range cutoff-  Female and " children under 18 years old <14 ng/L; Male <21 ng/L: Negative  Repeat testing should be performed if clinically indicated.   .  Female and children under 18 years old 14-50 ng/L; Male 21-50 ng/L:  Consistent with possible cardiac damage and possible increased clinical   risk. Serial measurements may help to assess extent of myocardial damage.   .  >50 ng/L: Consistent with cardiac damage, increased clinical risk and  myocardial infarction. Serial measurements may help assess extent of   myocardial damage.   .   NOTE: Children less than 1 year old may have higher baseline troponin   levels and results should be interpreted in conjunction with the overall   clinical context.   .  NOTE: Troponin I testing is performed using a different   testing methodology at [facility] than at other   [facility]. Direct result comparisons should only   be made within the same method.     08/21/2023 08:29 AM 5 0 - 20 ng/L Final     Comment:     .  Less than 99th percentile of normal range cutoff-  Female and children under 18 years old <14 ng/L; Male <21 ng/L: Negative  Repeat testing should be performed if clinically indicated.   .  Female and children under 18 years old 14-50 ng/L; Male 21-50 ng/L:  Consistent with possible cardiac damage and possible increased clinical   risk. Serial measurements may help to assess extent of myocardial damage.   .  >50 ng/L: Consistent with cardiac damage, increased clinical risk and  myocardial infarction. Serial measurements may help assess extent of   myocardial damage.   .   NOTE: Children less than 1 year old may have higher baseline troponin   levels and results should be interpreted in conjunction with the overall   clinical context.   .  NOTE: Troponin I testing is performed using a different   testing methodology at [facility] than at other   [facility] hospitals. Direct result comparisons should only   be made within the same method.       BNP   Date/Time  Value Ref Range Status   08/21/2023 08:29 AM 54 0 - 99 pg/mL Final     Comment:     .  <100 pg/mL - Heart failure unlikely  100-299 pg/mL - Intermediate probability of acute heart  .               failure exacerbation. Correlate with clinical  .               context and patient history.    >=300 pg/mL - Heart Failure likely. Correlate with clinical  .               context and patient history.  BNP testing is performed using different testing   methodology at [facility] than at other   [facility]. Direct result comparisons should   only be made within the same method.       HGBA1C   Date/Time Value Ref Range Status   07/17/2024 09:50 AM 8.6 see below % Final   02/26/2024 09:13 AM 7.8 see below % Final     LDLCALC   Date/Time Value Ref Range Status   03/19/2024 06:04 AM 43 <=99 mg/dL Final     Comment:                                 Near   Borderline      AGE      Desirable  Optimal    High     High     Very High     0-19 Y     0 - 109     ---    110-129   >/= 130     ----    20-24 Y     0 - 119     ---    120-159   >/= 160     ----      >24 Y     0 -  99   100-129  130-159   160-189     >/=190       VLDL   Date/Time Value Ref Range Status   03/19/2024 06:04 AM 28 0 - 40 mg/dL Final   11/11/2022 09:38 AM 52 0 - 40 mg/dL Final   09/22/2020 10:24 AM 44 0 - 40 mg/dL Final   06/26/2018 12:00 AM 33 0 - 40 mg/dL Final        Patient Medications:  Outpatient Encounter Medications as of 8/29/2024   Medication Sig Dispense Refill    cholecalciferol, vitamin D3, (VITAMIN D3 ORAL) Take 1 tablet by mouth once daily.      cloNIDine (Catapres) 0.2 mg tablet Take 1 tablet (0.2 mg) by mouth every 8 hours. 270 tablet 3    dilTIAZem CD (Cardizem CD) 240 mg 24 hr capsule Take 1 capsule (240 mg) by mouth once daily. 90 capsule 3    ezetimibe (Zetia) 10 mg tablet Take 1 tablet (10 mg) by mouth once daily. 90 tablet 3    insulin aspart, with niacinamide, (Fiasp FlexTouch U-100 Insulin) 100 unit/mL (3 mL) injection 3  times a day with meals. Sliding scale      insulin glargine (Basaglar KwikPen U-100 Insulin) 100 unit/mL (3 mL) pen Inject 45 Units under the skin 2 times a day. Take 40 Units in morning and 9 units at bedtime 81 mL 3    LORazepam (Ativan) 0.5 mg tablet Take 1 tablet (0.5 mg) by mouth every 6 hours if needed for anxiety for up to 7 days. 28 tablet 0    losartan (Cozaar) 100 mg tablet Take 1 tablet (100 mg) by mouth once daily. 90 tablet 3    metFORMIN (Glucophage) 500 mg tablet Take 2 tablets (1,000 mg) by mouth 2 times a day with meals. 360 tablet 3    metoprolol succinate XL (Toprol XL) 100 mg 24 hr tablet Take 1 tablet (100 mg) by mouth once daily. Do not crush or chew. 30 tablet 11    multivit-min-folic-vit K-lycop 400- mcg tablet       OneTouch Delica Plus Lancet 33 gauge misc CHECK BLOOD SUGARS 3 TIMES A DAY      OneTouch Ultra Test strip CHECK BLOOD SUGAR THREE TIMES DAILY      rosuvastatin (Crestor) 5 mg tablet once daily at bedtime.      Slow-Mag 71.5 mg tablet,delayed release (DR/EC) Take 1 tablet (71.5 mg) by mouth early in the morning..       No facility-administered encounter medications on file as of 8/29/2024.       Physical Exam:  General: alert, oriented and in no acute distress  HEENT: NC/AT; EOMI; PERRLA, external ear is normal  Neck: supple; trachea midline; no masses; no JVD  Chest: clear breath sounds bilaterally; no wheezing  Cardio: regular rhythm, S1S2 normal, no murmurs  Abdomen: Soft, non-tender, non-distension, no organomegaly  Extremities: no clubbing/cyanosis/edema  Neuro: Grossly intact     Psychiatric: Normal mood and affect     Past Cardiology Results (Last 3 Years):  EKG:  No results found for this or any previous visit from the past 1095 days.    Echo:  Echo Results:  No results found for this or any previous visit from the past 365 days.     Cath:  No results found for this or any previous visit from the past 1095 days.    CV NCDR CATHPCI V5 COLLECTION FORM   Stress  Test:  No results found for this or any previous visit from the past 1095 days.    Cardiac Imaging:  No results found for this or any previous visit from the past 1095 days.       Assessment/Plan     ..In summary, Mr. Av Bustos is a 68-year-old male with history of severe obesity, sleep apnea on CPAP, hypertension, diabetes mellitus type 2, possible obesity hypoventilation syndrome,  hyperlipidemia presented with syncope, sinus bradycardia, NERY, hyperkalemia, hyperglycemia, metabolic acidosis, hypomagnesemia. He states that he is still tachycardic (90bpm) even with top dose Metoprolol. He denies chest pain, shortness of breath, palpitations, leg edema, lightheadedness, headaches, fever, chills, orthopnea, paroxysmal nocturnal dyspnea or syncope.  EKG shows normal sinus rhythm with no signs of ACS.  The echocardiogram showed normal LVEF 60% with no wall motion abnormalities.  72h monitor (09/2023) showing baseline sinus rhythm, average HR 93bpm, <1% extra-beats.     Assessment     # Hypertension / White Coat Hypertension  - Patient returns today feeling well, but very concerned about his contantly elevated blood pressure measurements. Notably, he has White Coat Hypertension as well. Still complaining of elevated blood pressure. Notably, he reports that his BP has always been very high from his 20s, and sometimes associated with headache. He is compliant with the medication. Notably, he has been under a lot of stress, once he is taking care of his wife with cirrhosis. He brought today his home BP measurements, always elevated.   - Elevated BP in both arms, <10mmHg difference between them.  - He was suggested to avoid sedentary and be more active.  - Lab tests were OK. K 3.8, Cre 1.05.  - Patient returned fr two extra appointment stating that he was asymptomatic from the cardiovascular standpoint and that he was feeling fine. He has been compliant to the medication. His HR has been better controlled with Metoprolol  succinate instead of tartrate, however it is still low overnight ~40bpm.  - -  Keep Cardizem CR 240mg daily  - Keep Losartan 100mg daily.  - Keep Clonidine to 0.2mg q8h.  - Keep Metoprolol - will switch to succinate 100mg daily.  - Will restart hydrochlorothiazide 25mg daily.  - Would avoid the reinitiation of spironolactone, diltiazem, and nadolol all given issues with hyperkalemia and bradycardia in the past. Patient also had cough with Lisinopril.    - Follow up in 2 months - will check BP control after starting hydrochlorothiazide 25mg daily.     # Sinus tachycardia  - Echo showing LVEF 60%  - Resting heart rate of 110 bpm; patient remains asymptomatic, today improvement in HR 85bpm  - Would suggest to keep Metoprolol tartrate to 100mg BID  - Follow up in Cardiology clinic with Dr. George.  - EKG shows normal sinus tachycardia rhythm with no signs of ACS.  - The echocardiogram showed normal LVEF 60% with no wall motion abnormalities.  - 72h monitor (09/2023) showing baseline sinus rhythm, average HR 93bpm, <1% extra-beats.  - Keep Metoprolol tartrate 100mg q12h, Diltiazem 240mg daily.     # T2DM  - Keep insulin Lispro, Glipizide  - ISS  - Patient had episode of hypoglycemia after 80u insulin; would suggest to adjust it accordingly.     # Hyperlipidemia  - Keep atorvastatin 40mg daily, Ezetimibe 10mg daily     # BATSHEVA on CPAP  - Keep CPAP     We have discussed the most common side effects of the prescribed medications, indications, drug interactions, risks, complications, and alternatives of medications/therapeutics were explained and discussed. The patient has been requested to monitor closely for any untoward side effects or complications of medications. The patient has been strongly advised to be compliant with the recommendations, all the questions and concerns have been addressed. The patient has been also instructed to call, to return sooner or to go to the emergency department if symptoms persist or get  worsen. The patient voiced understanding and denies any further questions at this time.      This note was transcribed using the Dragon Dictation system. There may be grammatical, punctuation, or verbiage errors that occur with voice recognition programs.     Counseling greater than 50% of visit regarding all cardiac issues.     Thank you, Dr. Rodríguez, for allowing me to participate in the care of this patient. Please do not hesitate to contact me with any further questions or concerns.     Nikunj Bettencourt MD  Cardiology

## 2024-10-14 ENCOUNTER — LAB (OUTPATIENT)
Dept: LAB | Facility: LAB | Age: 69
End: 2024-10-14
Payer: MEDICARE

## 2024-10-14 DIAGNOSIS — E78.2 MIXED HYPERLIPIDEMIA: ICD-10-CM

## 2024-10-14 DIAGNOSIS — E11.65 TYPE 2 DIABETES MELLITUS WITH HYPERGLYCEMIA (MULTI): Primary | ICD-10-CM

## 2024-10-14 DIAGNOSIS — I10 ESSENTIAL (PRIMARY) HYPERTENSION: ICD-10-CM

## 2024-10-14 DIAGNOSIS — Z79.899 OTHER LONG TERM (CURRENT) DRUG THERAPY: ICD-10-CM

## 2024-10-14 LAB
ALBUMIN SERPL BCP-MCNC: 4 G/DL (ref 3.4–5)
ALP SERPL-CCNC: 57 U/L (ref 33–136)
ALT SERPL W P-5'-P-CCNC: 17 U/L (ref 10–52)
ANION GAP SERPL CALC-SCNC: 12 MMOL/L (ref 10–20)
AST SERPL W P-5'-P-CCNC: 17 U/L (ref 9–39)
BILIRUB SERPL-MCNC: 0.4 MG/DL (ref 0–1.2)
BUN SERPL-MCNC: 15 MG/DL (ref 6–23)
CALCIUM SERPL-MCNC: 8.9 MG/DL (ref 8.6–10.3)
CHLORIDE SERPL-SCNC: 103 MMOL/L (ref 98–107)
CO2 SERPL-SCNC: 27 MMOL/L (ref 21–32)
CREAT SERPL-MCNC: 1.08 MG/DL (ref 0.5–1.3)
EGFRCR SERPLBLD CKD-EPI 2021: 75 ML/MIN/1.73M*2
EST. AVERAGE GLUCOSE BLD GHB EST-MCNC: 209 MG/DL
GLUCOSE SERPL-MCNC: 224 MG/DL (ref 74–99)
HBA1C MFR BLD: 8.9 %
IRON SATN MFR SERPL: 21 % (ref 25–45)
IRON SERPL-MCNC: 74 UG/DL (ref 35–150)
POTASSIUM SERPL-SCNC: 3.7 MMOL/L (ref 3.5–5.3)
PROT SERPL-MCNC: 6.7 G/DL (ref 6.4–8.2)
SODIUM SERPL-SCNC: 138 MMOL/L (ref 136–145)
T4 FREE SERPL-MCNC: 0.82 NG/DL (ref 0.61–1.12)
TIBC SERPL-MCNC: 358 UG/DL (ref 240–445)
TSH SERPL-ACNC: 2.18 MIU/L (ref 0.44–3.98)
UIBC SERPL-MCNC: 284 UG/DL (ref 110–370)
VIT B12 SERPL-MCNC: 174 PG/ML (ref 211–911)

## 2024-10-14 PROCEDURE — 83540 ASSAY OF IRON: CPT

## 2024-10-14 PROCEDURE — 82607 VITAMIN B-12: CPT

## 2024-10-14 PROCEDURE — 80053 COMPREHEN METABOLIC PANEL: CPT

## 2024-10-14 PROCEDURE — 84443 ASSAY THYROID STIM HORMONE: CPT

## 2024-10-14 PROCEDURE — 83550 IRON BINDING TEST: CPT

## 2024-10-14 PROCEDURE — 36415 COLL VENOUS BLD VENIPUNCTURE: CPT

## 2024-10-14 PROCEDURE — 84439 ASSAY OF FREE THYROXINE: CPT

## 2024-10-14 PROCEDURE — 83036 HEMOGLOBIN GLYCOSYLATED A1C: CPT

## 2024-10-15 ENCOUNTER — TELEPHONE (OUTPATIENT)
Dept: PRIMARY CARE | Facility: CLINIC | Age: 69
End: 2024-10-15

## 2024-10-15 ENCOUNTER — APPOINTMENT (OUTPATIENT)
Age: 69
End: 2024-10-15
Payer: MEDICARE

## 2024-10-15 VITALS
BODY MASS INDEX: 44.97 KG/M2 | WEIGHT: 253.8 LBS | OXYGEN SATURATION: 95 % | HEIGHT: 63 IN | SYSTOLIC BLOOD PRESSURE: 154 MMHG | DIASTOLIC BLOOD PRESSURE: 100 MMHG | HEART RATE: 83 BPM

## 2024-10-15 DIAGNOSIS — D64.9 ANEMIA, UNSPECIFIED TYPE: ICD-10-CM

## 2024-10-15 DIAGNOSIS — E78.5 DYSLIPIDEMIA: ICD-10-CM

## 2024-10-15 DIAGNOSIS — E53.8 B12 DEFICIENCY: ICD-10-CM

## 2024-10-15 DIAGNOSIS — E11.22 TYPE 2 DIABETES MELLITUS WITH STAGE 3A CHRONIC KIDNEY DISEASE, WITHOUT LONG-TERM CURRENT USE OF INSULIN (MULTI): Primary | ICD-10-CM

## 2024-10-15 DIAGNOSIS — Z79.4 TYPE 2 DIABETES MELLITUS WITHOUT COMPLICATION, WITH LONG-TERM CURRENT USE OF INSULIN (MULTI): ICD-10-CM

## 2024-10-15 DIAGNOSIS — E66.01 OBESITY, MORBID, BMI 40.0-49.9 (MULTI): ICD-10-CM

## 2024-10-15 DIAGNOSIS — E11.9 TYPE 2 DIABETES MELLITUS WITHOUT COMPLICATION, WITH LONG-TERM CURRENT USE OF INSULIN (MULTI): ICD-10-CM

## 2024-10-15 DIAGNOSIS — I10 BENIGN ESSENTIAL HYPERTENSION: ICD-10-CM

## 2024-10-15 DIAGNOSIS — N18.31 TYPE 2 DIABETES MELLITUS WITH STAGE 3A CHRONIC KIDNEY DISEASE, WITHOUT LONG-TERM CURRENT USE OF INSULIN (MULTI): Primary | ICD-10-CM

## 2024-10-15 DIAGNOSIS — N18.31 STAGE 3A CHRONIC KIDNEY DISEASE (MULTI): ICD-10-CM

## 2024-10-15 PROCEDURE — 3062F POS MACROALBUMINURIA REV: CPT | Performed by: FAMILY MEDICINE

## 2024-10-15 PROCEDURE — 3080F DIAST BP >= 90 MM HG: CPT | Performed by: FAMILY MEDICINE

## 2024-10-15 PROCEDURE — 3048F LDL-C <100 MG/DL: CPT | Performed by: FAMILY MEDICINE

## 2024-10-15 PROCEDURE — 4010F ACE/ARB THERAPY RXD/TAKEN: CPT | Performed by: FAMILY MEDICINE

## 2024-10-15 PROCEDURE — 99214 OFFICE O/P EST MOD 30 MIN: CPT | Performed by: FAMILY MEDICINE

## 2024-10-15 PROCEDURE — 3077F SYST BP >= 140 MM HG: CPT | Performed by: FAMILY MEDICINE

## 2024-10-15 PROCEDURE — 1036F TOBACCO NON-USER: CPT | Performed by: FAMILY MEDICINE

## 2024-10-15 PROCEDURE — 3008F BODY MASS INDEX DOCD: CPT | Performed by: FAMILY MEDICINE

## 2024-10-15 PROCEDURE — 3052F HG A1C>EQUAL 8.0%<EQUAL 9.0%: CPT | Performed by: FAMILY MEDICINE

## 2024-10-15 PROCEDURE — 1160F RVW MEDS BY RX/DR IN RCRD: CPT | Performed by: FAMILY MEDICINE

## 2024-10-15 PROCEDURE — 1159F MED LIST DOCD IN RCRD: CPT | Performed by: FAMILY MEDICINE

## 2024-10-15 RX ORDER — INSULIN ASPART INJECTION 100 [IU]/ML
INJECTION, SOLUTION SUBCUTANEOUS
Qty: 164.7 ML | Refills: 3 | Status: SHIPPED | OUTPATIENT
Start: 2024-10-15 | End: 2025-10-15

## 2024-10-15 RX ORDER — INSULIN GLARGINE 100 [IU]/ML
45 INJECTION, SOLUTION SUBCUTANEOUS 2 TIMES DAILY
Qty: 81 ML | Refills: 3 | Status: SHIPPED | OUTPATIENT
Start: 2024-10-15 | End: 2025-10-15

## 2024-10-15 RX ORDER — INSULIN GLARGINE 100 [IU]/ML
45 INJECTION, SOLUTION SUBCUTANEOUS 2 TIMES DAILY
Qty: 81 ML | Refills: 3 | Status: SHIPPED | OUTPATIENT
Start: 2024-10-15 | End: 2024-10-15 | Stop reason: DRUGHIGH

## 2024-10-15 RX ORDER — FLUOXETINE 10 MG/1
1 CAPSULE ORAL
COMMUNITY
Start: 2024-04-04

## 2024-10-15 RX ORDER — FLASH GLUCOSE SENSOR
1 KIT MISCELLANEOUS
Qty: 6 EACH | Refills: 3 | Status: SHIPPED | OUTPATIENT
Start: 2024-10-15 | End: 2025-10-15

## 2024-10-15 RX ORDER — PIOGLITAZONEHYDROCHLORIDE 15 MG/1
15 TABLET ORAL DAILY
Qty: 30 TABLET | Refills: 11 | Status: SHIPPED | OUTPATIENT
Start: 2024-10-15 | End: 2025-10-15

## 2024-10-15 RX ORDER — METFORMIN HYDROCHLORIDE 1000 MG/1
1000 TABLET ORAL
Qty: 180 TABLET | Refills: 3 | Status: SHIPPED | OUTPATIENT
Start: 2024-10-15 | End: 2025-10-15

## 2024-10-15 ASSESSMENT — ENCOUNTER SYMPTOMS
DIFFICULTY URINATING: 0
SWEATS: 0
BLACKOUTS: 0
SEIZURES: 0
HUNGER: 1
LIGHT-HEADEDNESS: 0
COUGH: 0
CONFUSION: 0
WEAKNESS: 0
TREMORS: 0
DIZZINESS: 0
TROUBLE SWALLOWING: 0
VISUAL CHANGE: 0
ABDOMINAL PAIN: 0
NAUSEA: 0
POLYDIPSIA: 0
ABDOMINAL DISTENTION: 0
HEADACHES: 0
NUMBNESS: 0
RHINORRHEA: 0
WEIGHT LOSS: 0
ADENOPATHY: 0
ACTIVITY CHANGE: 0
VOMITING: 0
FATIGUE: 0
SHORTNESS OF BREATH: 0
ARTHRALGIAS: 0
WHEEZING: 0
PALPITATIONS: 0
APPETITE CHANGE: 0
SLEEP DISTURBANCE: 0
DIARRHEA: 0
DYSPHORIC MOOD: 0
POLYPHAGIA: 0
SPEECH DIFFICULTY: 0
NERVOUS/ANXIOUS: 1
UNEXPECTED WEIGHT CHANGE: 0
BLURRED VISION: 0
CONSTIPATION: 0

## 2024-10-15 NOTE — PROGRESS NOTES
Subjective   Patient ID: NITA Bustos is a 68 y.o. male who presents for DM LABS.    Diabetes  He has type 2 diabetes mellitus. No MedicAlert identification noted. The initial diagnosis of diabetes was made 14 years ago. Hypoglycemia symptoms include hunger and nervousness/anxiousness. Pertinent negatives for hypoglycemia include no confusion, dizziness, headaches, mood changes, pallor, seizures, sleepiness, speech difficulty, sweats or tremors. Pertinent negatives for diabetes include no blurred vision, no chest pain, no fatigue, no foot paresthesias, no foot ulcerations, no polydipsia, no polyphagia, no polyuria, no visual change, no weakness and no weight loss. Hypoglycemia complications include nocturnal hypoglycemia. Pertinent negatives for hypoglycemia complications include no blackouts, no hospitalization, no required assistance and no required glucagon injection. Symptoms are worsening. Diabetic complications include nephropathy and retinopathy. Pertinent negatives for diabetic complications include no CVA, heart disease, impotence, peripheral neuropathy or PVD. Risk factors for coronary artery disease include dyslipidemia, hypertension, obesity and stress. Current diabetic treatment includes insulin injections and oral agent (triple therapy). He is compliant with treatment most of the time. He is currently taking insulin pre-breakfast, pre-lunch and pre-dinner. Insulin injections are given by patient. Rotation sites for injection include the abdominal wall. His weight is fluctuating minimally. He is following a generally unhealthy diet. When asked about meal planning, he reported none. He has had a previous visit with a dietitian. He participates in exercise intermittently. He monitors blood glucose at home 3-4 x per day. He monitors urine at home <1 x per month. Blood glucose monitoring compliance is inadequate. His home blood glucose trend is increasing steadily. His breakfast blood glucose is taken  between 9-10 am. His breakfast blood glucose range is generally 180-200 mg/dl. His lunch blood glucose is taken between 12-1 pm. His lunch blood glucose range is generally 180-200 mg/dl. His dinner blood glucose is taken between 5-6 pm. His dinner blood glucose range is generally 180-200 mg/dl. His bedtime blood glucose is taken after 11 pm. His bedtime blood glucose range is generally 180-200 mg/dl. His overall blood glucose range is 180-200 mg/dl. He does not see a podiatrist.Eye exam is current.      Has been seeing endocrinology for diabetes, not happy with care  Has had issues affording non-generic medicines in the past  Has tried to use GLP-1, was cost prohibitive (had been on Victoza/semaglutide)  BS over 200 at times, no LBS  Diet poor, sees Optho (mild retinopathy)  No neuropathy, no ulcers  No nausea or diarrhea  Had done diabetes education in the past  HBP over 140/90, cardiology started hydrochlorothiazide at last OV      Review of Systems   Constitutional:  Negative for activity change, appetite change, fatigue, unexpected weight change and weight loss.   HENT:  Negative for ear pain, nosebleeds, rhinorrhea, sneezing and trouble swallowing.    Eyes:  Negative for blurred vision.   Respiratory:  Negative for cough, shortness of breath and wheezing.    Cardiovascular:  Negative for chest pain, palpitations and leg swelling.   Gastrointestinal:  Negative for abdominal distention, abdominal pain, constipation, diarrhea, nausea and vomiting.   Endocrine: Negative for polydipsia, polyphagia and polyuria.   Genitourinary:  Negative for difficulty urinating and impotence.   Musculoskeletal:  Negative for arthralgias.   Skin:  Negative for pallor and rash.   Neurological:  Negative for dizziness, tremors, seizures, speech difficulty, weakness, light-headedness, numbness and headaches.   Hematological:  Negative for adenopathy.   Psychiatric/Behavioral:  Negative for behavioral problems, confusion, dysphoric mood  "and sleep disturbance. The patient is nervous/anxious.    All other systems reviewed and are negative.      Objective   BP (!) 154/100   Pulse 83   Ht 1.6 m (5' 3\")   Wt 115 kg (253 lb 12.8 oz)   SpO2 95%   BMI 44.96 kg/m²     Physical Exam  Vitals and nursing note reviewed.   Constitutional:       Appearance: Normal appearance.   HENT:      Head: Normocephalic and atraumatic.      Right Ear: Tympanic membrane, ear canal and external ear normal.      Left Ear: Tympanic membrane, ear canal and external ear normal.      Nose: Nose normal.      Mouth/Throat:      Mouth: Mucous membranes are moist.      Pharynx: Oropharynx is clear.   Cardiovascular:      Rate and Rhythm: Normal rate and regular rhythm.      Pulses: Normal pulses.      Heart sounds: Normal heart sounds.   Pulmonary:      Effort: Pulmonary effort is normal.      Breath sounds: Normal breath sounds.   Musculoskeletal:      Cervical back: Normal range of motion and neck supple.   Skin:     General: Skin is warm and dry.   Neurological:      Mental Status: He is alert.   Psychiatric:         Mood and Affect: Mood normal.         Behavior: Behavior normal.         Assessment/Plan   Problem List Items Addressed This Visit             ICD-10-CM    Benign essential hypertension I10     Blood pressure elevated, follows with cardiology whose adjusting medication, had trouble affording SGL 2, has had trouble tolerating beta-blockers due to low heart rate, need to be cautious with diuretics due to history of low potassium.  Patient currently tolerating 25 mg hydrochlorothiazide, there is room to increase the diltiazem tolerating clonidine with surprisingly few side effects.  Has an appointment to follow-up with cardiology again in the next month.         Relevant Orders    Follow Up In Primary Care - Established    Type 2 diabetes mellitus without complications (Multi) E11.9     A1c testing above 8, diabetes not well-controlled, has had trouble affording GLP-1 " and SGLT2 medications in the past, tolerating maximum dose metformin, will discontinue glipizide, patient taking fast acting insulin 3 times a day with food.  Will continue with his current dosing and adjustments with his fast acting insulin with food, increase basal insulin to 45 units twice a day, add pioglitazone 15 mg once a day, no history of CHF, refer to clinical pharmacy for assistance to see if we can try to get the patient to be able to acquire a GLP-1.  I believe that this would be beneficial for his blood pressure, heart health, weight loss and hopefully be able to balance his blood sugars.  He had trouble affording semaglutide, had also been on Victoza in the past and had side effects to this.  Preference would be to try Mounjaro and titrate as tolerated.  Patient is also done diabetes education in the past as well.  Continues to use a continuous glucose meter.         Relevant Medications    insulin aspart, with niacinamide, (Fiasp FlexTouch U-100 Insulin) 100 unit/mL (3 mL) injection    FreeStyle Maya 2 Sensor kit    insulin glargine (Basaglar KwikPen U-100 Insulin) 100 unit/mL (3 mL) pen    metFORMIN (Glucophage) 1,000 mg tablet    pioglitazone (Actos) 15 mg tablet    Other Relevant Orders    Hemoglobin A1C    Lipid Panel    Comprehensive Metabolic Panel    Referral to Clinical Pharmacy    Follow Up In Primary Care - Established    Dyslipidemia E78.5     Currently tolerating 5 mg of rosuvastatin, this may be a medication which we can increase in the future.         Relevant Orders    Follow Up In Primary Care - Established    Obesity, morbid, BMI 40.0-49.9 (Multi) E66.01     Advised about diet, need to control blood sugar better, would be a good candidate for GLP-1 therapy.         Stage 3a chronic kidney disease (Multi) N18.31     Need to control blood pressure better, tolerating maximum dose losartan.         Relevant Orders    Follow Up In Primary Care - Established     Other Visit Diagnoses          Codes    Type 2 diabetes mellitus with stage 3a chronic kidney disease, without long-term current use of insulin (Multi)    -  Primary E11.22, N18.31    Relevant Medications    insulin aspart, with niacinamide, (Fiasp FlexTouch U-100 Insulin) 100 unit/mL (3 mL) injection    FreeStyle Maya 2 Sensor kit    insulin glargine (Basaglar KwikPen U-100 Insulin) 100 unit/mL (3 mL) pen    metFORMIN (Glucophage) 1,000 mg tablet    pioglitazone (Actos) 15 mg tablet    Other Relevant Orders    Hemoglobin A1C    Lipid Panel    Comprehensive Metabolic Panel    Referral to Clinical Pharmacy    Follow Up In Primary Care - Established    B12 deficiency     E53.8    Relevant Orders    CBC and Auto Differential    Vitamin B12    Follow Up In Primary Care - Established    Anemia, unspecified type     D64.9    Relevant Orders    CBC and Auto Differential    Ferritin    Follow Up In Primary Care - Established

## 2024-10-15 NOTE — TELEPHONE ENCOUNTER
I do not want to change this from how he has been using it.  It is a little confusing since this had been prescribed by Dr. Kaplan originally.  He uses a dose with meals three times a day and adds to that depending on sugar level at the time of eating.  Whatever the prior prescription was, I want to continue as is for now.

## 2024-10-15 NOTE — ASSESSMENT & PLAN NOTE
Blood pressure elevated, follows with cardiology whose adjusting medication, had trouble affording SGL 2, has had trouble tolerating beta-blockers due to low heart rate, need to be cautious with diuretics due to history of low potassium.  Patient currently tolerating 25 mg hydrochlorothiazide, there is room to increase the diltiazem tolerating clonidine with surprisingly few side effects.  Has an appointment to follow-up with cardiology again in the next month.

## 2024-10-15 NOTE — ASSESSMENT & PLAN NOTE
Currently tolerating 5 mg of rosuvastatin, this may be a medication which we can increase in the future.

## 2024-10-15 NOTE — ASSESSMENT & PLAN NOTE
Advised about diet, need to control blood sugar better, would be a good candidate for GLP-1 therapy.

## 2024-10-15 NOTE — TELEPHONE ENCOUNTER
ANTHONY AT [facility] CALLED.   NEED TO CLARIFY DIRECTIONS ON FLASPS FLEX TOUCH PEN.   3 DIFFERENT SETS OF DIRECTIONS ON PRESCRIPTION.

## 2024-10-15 NOTE — ASSESSMENT & PLAN NOTE
A1c testing above 8, diabetes not well-controlled, has had trouble affording GLP-1 and SGLT2 medications in the past, tolerating maximum dose metformin, will discontinue glipizide, patient taking fast acting insulin 3 times a day with food.  Will continue with his current dosing and adjustments with his fast acting insulin with food, increase basal insulin to 45 units twice a day, add pioglitazone 15 mg once a day, no history of CHF, refer to clinical pharmacy for assistance to see if we can try to get the patient to be able to acquire a GLP-1.  I believe that this would be beneficial for his blood pressure, heart health, weight loss and hopefully be able to balance his blood sugars.  He had trouble affording semaglutide, had also been on Victoza in the past and had side effects to this.  Preference would be to try Mounjaro and titrate as tolerated.  Patient is also done diabetes education in the past as well.  Continues to use a continuous glucose meter.

## 2024-10-16 NOTE — TELEPHONE ENCOUNTER
PER ANTHONY AT [facility],RX FROM DR RICHARD WAS 20 UNITS TID. WITH MEALS.   THIS IS THERE DIRECTIONS THEY WILL PUT ON.

## 2024-10-16 NOTE — TELEPHONE ENCOUNTER
Okay, patient also has printed instructions from Dr. Kaplan that he goes by.  It is fairly complicated to put this in a prescription at the pharmacy.

## 2024-10-21 NOTE — PROGRESS NOTES
"Patient is sent at the request of Matthew Rodríguez MD for my opinion regarding diabetes.  My recommendations below will be communicated back to the requesting provider by way of shared medical record.    Recommendations: ***    ________________________________________________________________________    Subjective   Past Medical History:  Patient Active Problem List   Diagnosis    Anxiety disorder    Benign essential hypertension    Cataract, minimal    Type 2 diabetes mellitus without complications (Multi)    Dyslipidemia    Glaucoma, bilateral    Gout    Obesity, morbid, BMI 40.0-49.9 (Multi)    Obstructive sleep apnea, adult    Retinopathy, background, nonproliferative, mild    Arthritis    Glaucoma    Bradycardia    Tachycardia    Syncope    Stage 3a chronic kidney disease (Multi)     Interim:  Av Bustos is a 68 y.o. male presents today for {VISIT; NEW, FOLLOW UP, CONSULT:23075} with endo PharmD for {Diabetes type:43765::\"Type 2 Diabetes Mellitus\"}. Last seen by {Endo providers:06201} on *** where ***    Today ***  ****************************************    Labs up to date  T2DM, obesity, CKD, mild NPDR  Mod statin, Zetia, ARB  Maya 2?      HAVE HIM BE SCHEDULED WITH PRIMARY CARE PHARMACIST?????    ****************************************        ****************************************      Diabetes Pharmacotherapy:  ***  ***    Adherence: {Adherence:35318}    Previously trialed meds: ***  ***    Social:  Current diet: {diet habits:85711}  Breakfast -   Lunch -   Dinner -   Snack -   Fluids -   Current exercise: {exercise types:67991}    Allergies:  Patient has no known allergies.    Medication list:  Current Outpatient Medications   Medication Instructions    Basaglar KwikPen U-100 Insulin 45 Units, subcutaneous, 2 times daily    cholecalciferol, vitamin D3, (VITAMIN D3 ORAL) 1 tablet, oral, Daily    cloNIDine (CATAPRES) 0.2 mg, oral, Every 8 hours    dilTIAZem CD (CARDIZEM CD) 240 mg, oral, Daily "    ezetimibe (ZETIA) 10 mg, oral, Daily    FLUoxetine (PROzac) 10 mg capsule 1 capsule, oral, Daily (0630)    FreeStyle Maya 2 Sensor kit 1 each, subcutaneous, Every 14 days    hydroCHLOROthiazide (HYDRODIURIL) 25 mg, oral, Daily    insulin aspart, with niacinamide, (Fiasp FlexTouch U-100 Insulin) 100 unit/mL (3 mL) injection Inject 23 Units under the skin 3 times daily (morning, midday, late afternoon) AND 15 Units 3 times daily (morning, midday, late afternoon) AND 23 Units 3 times daily (morning, midday, late afternoon). Sliding scale.    LORazepam (ATIVAN) 0.5 mg, oral, Every 6 hours PRN    losartan (COZAAR) 100 mg, oral, Daily    metFORMIN (GLUCOPHAGE) 1,000 mg, oral, 2 times daily (morning and late afternoon)    metoprolol succinate XL (TOPROL XL) 100 mg, oral, Daily, Do not crush or chew.    multivit-min-folic-vit K-lycop 400- mcg tablet     OneTouch Delica Plus Lancet 33 gauge misc CHECK BLOOD SUGARS 3 TIMES A DAY    OneTouch Ultra Test strip CHECK BLOOD SUGAR THREE TIMES DAILY    pioglitazone (ACTOS) 15 mg, oral, Daily    rosuvastatin (Crestor) 5 mg tablet Nightly    Slow-Mag 71.5 mg tablet,delayed release (DR/EC) 1 tablet, oral, Daily (0630)        Objective   Last Recorded Vitals:  BP Readings from Last 3 Encounters:   10/15/24 (!) 154/100   08/29/24 (!) 162/100   06/28/24 (!) 150/100     Wt Readings from Last 3 Encounters:   10/15/24 115 kg (253 lb 12.8 oz)   08/29/24 113 kg (248 lb 1.6 oz)   06/28/24 113 kg (248 lb 6.4 oz)     BMI Readings from Last 1 Encounters:   10/15/24 44.96 kg/m²      Labs  A1C  Lab Results   Component Value Date    HGBA1C 8.9 (H) 10/14/2024    HGBA1C 8.6 (H) 07/17/2024    HGBA1C 7.8 (H) 02/26/2024     BMP/LFTs  Lab Results   Component Value Date    CREATININE 1.08 10/14/2024    CREATININE 0.96 07/17/2024    CREATININE 1.12 06/07/2024    EGFR 75 10/14/2024    EGFR 87 07/17/2024    EGFR 72 06/07/2024    GLUCOSE 224 (H) 10/14/2024     10/14/2024    K 3.7 10/14/2024     " 10/14/2024    CALCIUM 8.9 10/14/2024    CO2 27 10/14/2024    BUN 15 10/14/2024    ALT 17 10/14/2024    AST 17 10/14/2024    ALKPHOS 57 10/14/2024    BILITOT 0.4 10/14/2024     Lipids  Lab Results   Component Value Date    TRIG 138 2024    CHOL 110 2024    LDLF 108 (H) 2022    LDLCALC 43 2024    HDL 39.0 2024     Urine Albumin Creatinine Ratio  Lab Results   Component Value Date    MICROALBCREA 376.7 (H) 2024    MICROALBCREA 13.9 2020    MICROALBCREA 38.5 (H) 2019     ASCVD risk  The ASCVD Risk score (Marco HODGE, et al., 2019) failed to calculate for the following reasons:    The valid total cholesterol range is 130 to 320 mg/dL    Home glucose monitoring:  Hypoglycemia: {Lows:55967}  ***      Assessment/Plan   {Diabetes type:25811::\"Type 2 Diabetes Mellitus\"}  Goal A1C ***  Plan:  Start ***  Discontinue ***  Increase ***  Decrease ***  Continue ***  Home glucose monitoring:   {Home glucose monitorin}  A minimum of 72 hours of CGM data was reviewed and used to make therapy changes. ***  Education Provided to Patient:   ***   {Primary/Secondary:01648} prevention:   Therapy: {Statin:98239}   {Lipid specific results and goals:5735} (***/***)  Renal:  CKD: {stage:15962301}  ACR: {ACR:94364} (***/***)  Renal protective agents: {Renal protective agents:19323}  DM medications are dosed appropriately for renal function  Labs: up to date ***  PharmD follow-up: ***  Endo follow-up: ***    Patient agreeable to plan as above, contact information provided for any future questions or concerns.    Enrique Aquino, PharmD    Type of encounter: {visit type:39086}  Provider on site: {Endo providers:02295}    Continue all meds under the continuation of care with the referring provider and clinical pharmacy team.     "

## 2024-10-22 ENCOUNTER — APPOINTMENT (OUTPATIENT)
Age: 69
End: 2024-10-22
Payer: MEDICARE

## 2024-10-22 ENCOUNTER — APPOINTMENT (OUTPATIENT)
Dept: PHARMACY | Facility: HOSPITAL | Age: 69
End: 2024-10-22
Payer: MEDICARE

## 2024-10-22 DIAGNOSIS — E53.8 B12 DEFICIENCY: ICD-10-CM

## 2024-10-22 PROCEDURE — 96372 THER/PROPH/DIAG INJ SC/IM: CPT | Performed by: FAMILY MEDICINE

## 2024-10-22 RX ORDER — CYANOCOBALAMIN 1000 UG/ML
1000 INJECTION, SOLUTION INTRAMUSCULAR; SUBCUTANEOUS ONCE
Status: COMPLETED | OUTPATIENT
Start: 2024-10-22 | End: 2024-10-22

## 2024-10-29 ENCOUNTER — APPOINTMENT (OUTPATIENT)
Dept: CARDIOLOGY | Facility: CLINIC | Age: 69
End: 2024-10-29
Payer: MEDICARE

## 2024-10-29 ENCOUNTER — CLINICAL SUPPORT (OUTPATIENT)
Age: 69
End: 2024-10-29
Payer: MEDICARE

## 2024-10-29 ENCOUNTER — TELEPHONE (OUTPATIENT)
Age: 69
End: 2024-10-29

## 2024-10-29 ENCOUNTER — APPOINTMENT (OUTPATIENT)
Dept: PHARMACY | Facility: HOSPITAL | Age: 69
End: 2024-10-29
Payer: MEDICARE

## 2024-10-29 VITALS
OXYGEN SATURATION: 96 % | SYSTOLIC BLOOD PRESSURE: 144 MMHG | BODY MASS INDEX: 44.69 KG/M2 | DIASTOLIC BLOOD PRESSURE: 86 MMHG | HEIGHT: 63 IN | HEART RATE: 70 BPM | WEIGHT: 252.2 LBS

## 2024-10-29 DIAGNOSIS — R00.0 TACHYCARDIA: ICD-10-CM

## 2024-10-29 DIAGNOSIS — E53.8 B12 DEFICIENCY: ICD-10-CM

## 2024-10-29 PROCEDURE — 99214 OFFICE O/P EST MOD 30 MIN: CPT | Performed by: STUDENT IN AN ORGANIZED HEALTH CARE EDUCATION/TRAINING PROGRAM

## 2024-10-29 PROCEDURE — 96372 THER/PROPH/DIAG INJ SC/IM: CPT | Performed by: FAMILY MEDICINE

## 2024-10-29 PROCEDURE — 3062F POS MACROALBUMINURIA REV: CPT | Performed by: STUDENT IN AN ORGANIZED HEALTH CARE EDUCATION/TRAINING PROGRAM

## 2024-10-29 PROCEDURE — 3079F DIAST BP 80-89 MM HG: CPT | Performed by: STUDENT IN AN ORGANIZED HEALTH CARE EDUCATION/TRAINING PROGRAM

## 2024-10-29 PROCEDURE — 3077F SYST BP >= 140 MM HG: CPT | Performed by: STUDENT IN AN ORGANIZED HEALTH CARE EDUCATION/TRAINING PROGRAM

## 2024-10-29 PROCEDURE — 93000 ELECTROCARDIOGRAM COMPLETE: CPT | Performed by: STUDENT IN AN ORGANIZED HEALTH CARE EDUCATION/TRAINING PROGRAM

## 2024-10-29 PROCEDURE — 1159F MED LIST DOCD IN RCRD: CPT | Performed by: STUDENT IN AN ORGANIZED HEALTH CARE EDUCATION/TRAINING PROGRAM

## 2024-10-29 PROCEDURE — 1160F RVW MEDS BY RX/DR IN RCRD: CPT | Performed by: STUDENT IN AN ORGANIZED HEALTH CARE EDUCATION/TRAINING PROGRAM

## 2024-10-29 PROCEDURE — 3048F LDL-C <100 MG/DL: CPT | Performed by: STUDENT IN AN ORGANIZED HEALTH CARE EDUCATION/TRAINING PROGRAM

## 2024-10-29 PROCEDURE — 3008F BODY MASS INDEX DOCD: CPT | Performed by: STUDENT IN AN ORGANIZED HEALTH CARE EDUCATION/TRAINING PROGRAM

## 2024-10-29 PROCEDURE — 1036F TOBACCO NON-USER: CPT | Performed by: STUDENT IN AN ORGANIZED HEALTH CARE EDUCATION/TRAINING PROGRAM

## 2024-10-29 PROCEDURE — 4010F ACE/ARB THERAPY RXD/TAKEN: CPT | Performed by: STUDENT IN AN ORGANIZED HEALTH CARE EDUCATION/TRAINING PROGRAM

## 2024-10-29 PROCEDURE — 3052F HG A1C>EQUAL 8.0%<EQUAL 9.0%: CPT | Performed by: STUDENT IN AN ORGANIZED HEALTH CARE EDUCATION/TRAINING PROGRAM

## 2024-10-29 RX ORDER — CYANOCOBALAMIN 1000 UG/ML
1000 INJECTION, SOLUTION INTRAMUSCULAR; SUBCUTANEOUS ONCE
Status: COMPLETED | OUTPATIENT
Start: 2024-10-29 | End: 2024-10-29

## 2024-11-04 ENCOUNTER — APPOINTMENT (OUTPATIENT)
Dept: PHARMACY | Facility: HOSPITAL | Age: 69
End: 2024-11-04
Payer: MEDICARE

## 2024-11-04 DIAGNOSIS — N18.31 TYPE 2 DIABETES MELLITUS WITH STAGE 3A CHRONIC KIDNEY DISEASE, WITHOUT LONG-TERM CURRENT USE OF INSULIN (MULTI): ICD-10-CM

## 2024-11-04 DIAGNOSIS — N18.31 STAGE 3A CHRONIC KIDNEY DISEASE (MULTI): ICD-10-CM

## 2024-11-04 DIAGNOSIS — Z79.4 TYPE 2 DIABETES MELLITUS WITHOUT COMPLICATION, WITH LONG-TERM CURRENT USE OF INSULIN (MULTI): ICD-10-CM

## 2024-11-04 DIAGNOSIS — E11.9 TYPE 2 DIABETES MELLITUS WITHOUT COMPLICATION, WITH LONG-TERM CURRENT USE OF INSULIN (MULTI): ICD-10-CM

## 2024-11-04 DIAGNOSIS — E11.22 TYPE 2 DIABETES MELLITUS WITH STAGE 3A CHRONIC KIDNEY DISEASE, WITHOUT LONG-TERM CURRENT USE OF INSULIN (MULTI): ICD-10-CM

## 2024-11-04 PROCEDURE — RXMED WILLOW AMBULATORY MEDICATION CHARGE

## 2024-11-04 RX ORDER — INSULIN ASPART INJECTION 100 [IU]/ML
INJECTION, SOLUTION SUBCUTANEOUS
Qty: 90 ML | Refills: 1 | Status: SHIPPED | OUTPATIENT
Start: 2024-11-04

## 2024-11-04 RX ORDER — INSULIN GLARGINE 100 [IU]/ML
45 INJECTION, SOLUTION SUBCUTANEOUS 2 TIMES DAILY
Qty: 90 ML | Refills: 1 | Status: SHIPPED | OUTPATIENT
Start: 2024-11-04 | End: 2025-11-04

## 2024-11-04 NOTE — PROGRESS NOTES
Patient ID: Av Bustos is a 68 y.o. male who presents for Diabetes.    Referring Provider: Matthew Rodríguez MD  PCP: Matthew Rodríguez MD     Subjective   Mr. Bustos presents to the pharmacy team for medication review for his T2DM. Patient previously was seen by endo for his care and in January of this year was deemed a candidate for GLP1 therapy. Patient mentioned success with starting Ozempic and was able to do 2 months of therapy from early February to April. He mentions no side effects of getting up to the 0.5 mg weekly dose of Ozempic.     Patient mentions he was put in a difficult situation financially with the copays of Ozempic coming up after April and had to discontinue his Ozempic. He mentions that the stress in his family/household and no longer on Ozempic has caused his BG to be much higher than he wants it to be. He mentions that his Freestyle Maya reports at times BG >200. Most recent A1c has trended higher going from 7.8% in February to 8.9% last month.     Patient's PCP at most recent visit referred pt to pharmacy team to see if there can be financial relief for patient's Ozempic and other name brand medications such as Basaglar and Fiasp. Patient mentions he is taking more Fiasp than what is written by his endo. He may take as much as close to 30 units with each meal. Still is taking 45 units BID of Basaglar. Needs to take higher amount of Fiasp with each meal because of the BG elevation. Is looking to see if he can restart the Ozempic.     Objective     There were no vitals taken for this visit.   BP Readings from Last 4 Encounters:   10/29/24 144/86   10/15/24 (!) 154/100   08/29/24 (!) 162/100   06/28/24 (!) 150/100      There were no vitals filed for this visit.     Labs  Lab Results   Component Value Date    BILITOT 0.4 10/14/2024    CALCIUM 8.9 10/14/2024    CO2 27 10/14/2024     10/14/2024    CREATININE 1.08 10/14/2024    GLUCOSE 224 (H) 10/14/2024    ALKPHOS 57  10/14/2024    K 3.7 10/14/2024    PROT 6.7 10/14/2024     10/14/2024    AST 17 10/14/2024    ALT 17 10/14/2024    BUN 15 10/14/2024    ANIONGAP 12 10/14/2024    MG 1.59 (L) 03/19/2024    PHOS 3.7 08/22/2023    ALBUMIN 4.0 10/14/2024    GFRMALE 83 09/12/2023     Lab Results   Component Value Date    TRIG 138 03/19/2024    CHOL 110 03/19/2024    LDLCALC 43 03/19/2024    HDL 39.0 03/19/2024     Lab Results   Component Value Date    HGBA1C 8.9 (H) 10/14/2024       Current Outpatient Medications   Medication Instructions    Basaglar KwikPen U-100 Insulin 45 Units, subcutaneous, 2 times daily    cholecalciferol, vitamin D3, (VITAMIN D3 ORAL) 1 tablet, Daily    cloNIDine (CATAPRES) 0.2 mg, oral, Every 8 hours    dilTIAZem CD (CARDIZEM CD) 240 mg, oral, Daily    ezetimibe (ZETIA) 10 mg, oral, Daily    FLUoxetine (PROzac) 10 mg capsule 1 capsule, Daily (0630)    FreeStyle Maya 2 Sensor kit 1 each, subcutaneous, Every 14 days    hydroCHLOROthiazide (HYDRODIURIL) 25 mg, oral, Daily    insulin aspart, with niacinamide, (Fiasp FlexTouch U-100 Insulin) 100 unit/mL (3 mL) injection Inject up to 30 units three times daily before meals as directed up to a max of 90 units/day    LORazepam (ATIVAN) 0.5 mg, oral, Every 6 hours PRN    losartan (COZAAR) 100 mg, oral, Daily    metFORMIN (GLUCOPHAGE) 1,000 mg, oral, 2 times daily (morning and late afternoon)    metoprolol succinate XL (TOPROL XL) 100 mg, oral, Daily, Do not crush or chew.    multivit-min-folic-vit K-lycop 400- mcg tablet     OneTouch Delica Plus Lancet 33 gauge misc CHECK BLOOD SUGARS 3 TIMES A DAY    OneTouch Ultra Test strip CHECK BLOOD SUGAR THREE TIMES DAILY    pioglitazone (ACTOS) 15 mg, oral, Daily    rosuvastatin (Crestor) 5 mg tablet Nightly    semaglutide 0.5 mg, subcutaneous, Weekly    Slow-Mag 71.5 mg tablet,delayed release (DR/EC) 1 tablet, Daily (0630)         Assessment/Plan   Problem List Items Addressed This Visit             ICD-10-CM    Type  2 diabetes mellitus without complications (Multi) E11.9    Relevant Medications    insulin glargine (Basaglar KwikPen U-100 Insulin) 100 unit/mL (3 mL) pen    insulin aspart, with niacinamide, (Fiasp FlexTouch U-100 Insulin) 100 unit/mL (3 mL) injection    semaglutide 0.25 mg or 0.5 mg (2 mg/3 mL) pen injector    Stage 3a chronic kidney disease (Multi) N18.31     Other Visit Diagnoses         Codes    Type 2 diabetes mellitus with stage 3a chronic kidney disease, without long-term current use of insulin (Multi)     E11.22, N18.31    Relevant Medications    insulin glargine (Basaglar KwikPen U-100 Insulin) 100 unit/mL (3 mL) pen    insulin aspart, with niacinamide, (Fiasp FlexTouch U-100 Insulin) 100 unit/mL (3 mL) injection    semaglutide 0.25 mg or 0.5 mg (2 mg/3 mL) pen injector        Restart taking ozempic, at 0.25 mg subcutaneous weekly, escalate to 0.5 mg weekly after 2-4 weeks of restarting the 0.25 mg weekly dose.   Continue taking metformin 1000 mg BID  Continue taking Fiasp up to 30 units TIDAC  Continue taking Basaglar 45 units BID   Goal at next follow up visit is to adjust insulins based on GLP1 response to get patient closer to goal glycemic range for FBG      Follow-up: 2-3 weeks to assess GLP1 tolerance and dictate any insulin dosing changes as needed with glycemic response with Ozempic      Time spent with pt: Total length of time 45 (minutes) of the encounter and more than 50% was spent counseling the patient.    Raghav Smith, PharmD    Continue all meds under the continuation of care with the referring provider and clinical pharmacy team.

## 2024-11-05 ENCOUNTER — APPOINTMENT (OUTPATIENT)
Age: 69
End: 2024-11-05
Payer: MEDICARE

## 2024-11-05 ENCOUNTER — APPOINTMENT (OUTPATIENT)
Dept: PHARMACY | Facility: HOSPITAL | Age: 69
End: 2024-11-05
Payer: MEDICARE

## 2024-11-05 DIAGNOSIS — E53.8 B12 DEFICIENCY: ICD-10-CM

## 2024-11-05 PROCEDURE — 96372 THER/PROPH/DIAG INJ SC/IM: CPT | Performed by: FAMILY MEDICINE

## 2024-11-05 RX ORDER — CYANOCOBALAMIN 1000 UG/ML
1000 INJECTION, SOLUTION INTRAMUSCULAR; SUBCUTANEOUS ONCE
Status: COMPLETED | OUTPATIENT
Start: 2024-11-05 | End: 2024-11-05

## 2024-11-06 ENCOUNTER — PHARMACY VISIT (OUTPATIENT)
Dept: PHARMACY | Facility: CLINIC | Age: 69
End: 2024-11-06
Payer: COMMERCIAL

## 2024-11-07 ENCOUNTER — TELEPHONE (OUTPATIENT)
Age: 69
End: 2024-11-07
Payer: MEDICARE

## 2024-11-07 NOTE — TELEPHONE ENCOUNTER
The patient had mentioned starting semaglutide, looks like clinical pharmacist sent prescription on 4 November.  Based on his blood sugars, I would recommend using his fast acting insulin aspartate 23 units with each meal and adjust based on scale.  Continue with Basaglar at 45 units twice a day.  Report blood sugars again in another week.

## 2024-11-19 NOTE — TELEPHONE ENCOUNTER
The patient brought in his blood sugars over the past week, he is having some occasional low sugars, have him continue with his current dosing of Basaglar, reduce his base fast acting insulin to 20 units with meals and continue with scale based on his sugar.  Report sugars again in another week, and call if continuing to have trouble with low sugars.  The neck step would be to increase the Ozempic to a milligram a week in the next couple weeks.

## 2024-11-20 ENCOUNTER — PHARMACY VISIT (OUTPATIENT)
Dept: PHARMACY | Facility: CLINIC | Age: 69
End: 2024-11-20
Payer: COMMERCIAL

## 2024-12-04 ENCOUNTER — LAB (OUTPATIENT)
Dept: LAB | Facility: LAB | Age: 69
End: 2024-12-04
Payer: MEDICARE

## 2024-12-04 DIAGNOSIS — N18.2 STAGE 2 CHRONIC KIDNEY DISEASE: ICD-10-CM

## 2024-12-04 LAB
ANION GAP SERPL CALC-SCNC: 11 MMOL/L (ref 10–20)
BUN SERPL-MCNC: 17 MG/DL (ref 6–23)
CALCIUM SERPL-MCNC: 9.5 MG/DL (ref 8.6–10.3)
CHLORIDE SERPL-SCNC: 101 MMOL/L (ref 98–107)
CO2 SERPL-SCNC: 30 MMOL/L (ref 21–32)
CREAT SERPL-MCNC: 1.15 MG/DL (ref 0.5–1.3)
CREAT UR-MCNC: 149.4 MG/DL (ref 20–370)
EGFRCR SERPLBLD CKD-EPI 2021: 69 ML/MIN/1.73M*2
GLUCOSE SERPL-MCNC: 71 MG/DL (ref 74–99)
MICROALBUMIN UR-MCNC: 144.8 MG/L
MICROALBUMIN/CREAT UR: 96.9 UG/MG CREAT
POTASSIUM SERPL-SCNC: 4.1 MMOL/L (ref 3.5–5.3)
SODIUM SERPL-SCNC: 138 MMOL/L (ref 136–145)

## 2024-12-04 PROCEDURE — 36415 COLL VENOUS BLD VENIPUNCTURE: CPT

## 2024-12-04 PROCEDURE — 82043 UR ALBUMIN QUANTITATIVE: CPT

## 2024-12-04 PROCEDURE — 80048 BASIC METABOLIC PNL TOTAL CA: CPT

## 2024-12-04 PROCEDURE — 82570 ASSAY OF URINE CREATININE: CPT

## 2024-12-04 RX ORDER — CYANOCOBALAMIN 1000 UG/ML
1000 INJECTION, SOLUTION INTRAMUSCULAR; SUBCUTANEOUS ONCE
Status: COMPLETED | OUTPATIENT
Start: 2024-12-05 | End: 2024-12-05

## 2024-12-05 ENCOUNTER — APPOINTMENT (OUTPATIENT)
Age: 69
End: 2024-12-05
Payer: MEDICARE

## 2024-12-05 ENCOUNTER — TELEPHONE (OUTPATIENT)
Age: 69
End: 2024-12-05

## 2024-12-05 DIAGNOSIS — E53.8 B12 DEFICIENCY: ICD-10-CM

## 2024-12-05 PROCEDURE — 96372 THER/PROPH/DIAG INJ SC/IM: CPT | Performed by: FAMILY MEDICINE

## 2024-12-05 NOTE — TELEPHONE ENCOUNTER
Overall his blood sugars look good, he is having an occasional low sugar, we also need to start increasing his Ozempic.  If he is taking half a milligram a week, we can increase to 1 whole milligram a week.  His goal for 2-hour post meal sugars should be 140 or so, overall he is meeting this goal.  If his blood sugar before he eats is below 100, have him hold the fast acting insulin.       LEFT MESSAGE FOR PATIENT TO CALL OFFICE.

## 2024-12-05 NOTE — TELEPHONE ENCOUNTER
Overall his blood sugars look good, he is having an occasional low sugar, we also need to start increasing his Ozempic.  If he is taking half a milligram a week, we can increase to 1 whole milligram a week.  His goal for 2-hour post meal sugars should be 140 or so, overall he is meeting this goal.  If his blood sugar before he eats is below 100, have him hold the fast acting insulin.

## 2024-12-06 ENCOUNTER — TELEPHONE (OUTPATIENT)
Age: 69
End: 2024-12-06
Payer: MEDICARE

## 2024-12-06 NOTE — TELEPHONE ENCOUNTER
Overall his blood sugars look good, he is having an occasional low sugar, we also need to start increasing his Ozempic. If he is taking half a milligram a week, we can increase to 1 whole milligram a week. His goal for 2-hour post meal sugars should be 140 or so, overall he is meeting this goal. If his blood sugar before he eats is below 100, have him hold the fast acting insulin.     Patient notified. Verbalized understanding. Repeated instructions back.

## 2024-12-12 ENCOUNTER — APPOINTMENT (OUTPATIENT)
Dept: NEPHROLOGY | Facility: CLINIC | Age: 69
End: 2024-12-12
Payer: MEDICARE

## 2024-12-12 VITALS
DIASTOLIC BLOOD PRESSURE: 80 MMHG | SYSTOLIC BLOOD PRESSURE: 142 MMHG | HEIGHT: 63 IN | HEART RATE: 74 BPM | WEIGHT: 255 LBS | BODY MASS INDEX: 45.18 KG/M2

## 2024-12-12 DIAGNOSIS — E11.9 TYPE 2 DIABETES MELLITUS WITHOUT COMPLICATION, WITH LONG-TERM CURRENT USE OF INSULIN (MULTI): ICD-10-CM

## 2024-12-12 DIAGNOSIS — N18.31 STAGE 3A CHRONIC KIDNEY DISEASE (MULTI): Primary | ICD-10-CM

## 2024-12-12 DIAGNOSIS — Z79.4 TYPE 2 DIABETES MELLITUS WITHOUT COMPLICATION, WITH LONG-TERM CURRENT USE OF INSULIN (MULTI): ICD-10-CM

## 2024-12-12 DIAGNOSIS — I10 BENIGN ESSENTIAL HYPERTENSION: ICD-10-CM

## 2024-12-12 PROCEDURE — 3048F LDL-C <100 MG/DL: CPT | Performed by: CLINICAL NURSE SPECIALIST

## 2024-12-12 PROCEDURE — 3077F SYST BP >= 140 MM HG: CPT | Performed by: CLINICAL NURSE SPECIALIST

## 2024-12-12 PROCEDURE — 1159F MED LIST DOCD IN RCRD: CPT | Performed by: CLINICAL NURSE SPECIALIST

## 2024-12-12 PROCEDURE — 3079F DIAST BP 80-89 MM HG: CPT | Performed by: CLINICAL NURSE SPECIALIST

## 2024-12-12 PROCEDURE — 1036F TOBACCO NON-USER: CPT | Performed by: CLINICAL NURSE SPECIALIST

## 2024-12-12 PROCEDURE — 4010F ACE/ARB THERAPY RXD/TAKEN: CPT | Performed by: CLINICAL NURSE SPECIALIST

## 2024-12-12 PROCEDURE — 99213 OFFICE O/P EST LOW 20 MIN: CPT | Performed by: CLINICAL NURSE SPECIALIST

## 2024-12-12 PROCEDURE — 1160F RVW MEDS BY RX/DR IN RCRD: CPT | Performed by: CLINICAL NURSE SPECIALIST

## 2024-12-12 PROCEDURE — 3052F HG A1C>EQUAL 8.0%<EQUAL 9.0%: CPT | Performed by: CLINICAL NURSE SPECIALIST

## 2024-12-12 PROCEDURE — 3008F BODY MASS INDEX DOCD: CPT | Performed by: CLINICAL NURSE SPECIALIST

## 2024-12-12 PROCEDURE — 3060F POS MICROALBUMINURIA REV: CPT | Performed by: CLINICAL NURSE SPECIALIST

## 2024-12-12 RX ORDER — DAPAGLIFLOZIN 10 MG/1
10 TABLET, FILM COATED ORAL DAILY
Qty: 90 TABLET | Refills: 3 | Status: SHIPPED | OUTPATIENT
Start: 2024-12-12 | End: 2025-12-07

## 2024-12-12 ASSESSMENT — ENCOUNTER SYMPTOMS
NEUROLOGICAL NEGATIVE: 1
CONSTITUTIONAL NEGATIVE: 1
MUSCULOSKELETAL NEGATIVE: 1
GASTROINTESTINAL NEGATIVE: 1
CARDIOVASCULAR NEGATIVE: 1
ENDOCRINE NEGATIVE: 1
PSYCHIATRIC NEGATIVE: 1
RESPIRATORY NEGATIVE: 1

## 2024-12-12 NOTE — ASSESSMENT & PLAN NOTE
Creatinine is stable at 1.15, blood pressure is well-controlled, last hemoglobin A1c 8.9, on Ozempic, has been able to decrease some of his dose of insulin secondary to some hypoglycemia, on metformin, will start Farxiga at 5 mg for renal protection, I have sent this to [facility] pharmacy as he does get some coverage for his medications through them

## 2024-12-12 NOTE — PROGRESS NOTES
Subjective   Patient ID: Av Bustos is a 69 y.o. male who presents for No chief complaint on file..  Patient being seen in follow-up for chronic kidney disease stage II/IIIa with history of hypertension and diabetes mellitus    Labs reviewed  Albumin creatinine ratio 96.9  Glucose 71  Sodium 138, potassium 4.1, chloride 101, bicarb 30  Renal function with BUN of 17 and creatinine of 1.15  Last hemoglobin A1c 8.9    He states he is getting better control of his diabetes, he is very happy with interventions that have been completed so that he can get Ozempic and his insulin more easily  He is voiding without difficulties  He has no edema  His blood pressure is well-controlled at home, he states it is normally in the 130s systolically at home          Review of Systems   Constitutional: Negative.    Respiratory: Negative.     Cardiovascular: Negative.    Gastrointestinal: Negative.    Endocrine: Negative.    Genitourinary: Negative.    Musculoskeletal: Negative.    Skin: Negative.    Neurological: Negative.    Psychiatric/Behavioral: Negative.         Objective   Physical Exam  Vitals reviewed.   Constitutional:       Appearance: Normal appearance. He is obese.   HENT:      Head: Normocephalic.   Cardiovascular:      Rate and Rhythm: Normal rate and regular rhythm.   Pulmonary:      Effort: Pulmonary effort is normal.      Breath sounds: Normal breath sounds.   Abdominal:      Palpations: Abdomen is soft.   Musculoskeletal:         General: Normal range of motion.   Skin:     General: Skin is warm and dry.   Neurological:      Mental Status: He is alert and oriented to person, place, and time.   Psychiatric:         Mood and Affect: Mood normal.         Behavior: Behavior normal.         Assessment/Plan   Problem List Items Addressed This Visit             ICD-10-CM    Benign essential hypertension I10     Blood pressure is well-controlled on multiple medications including clonidine, losartan, metoprolol,  diltiazem, and hydrochlorothiazide         Type 2 diabetes mellitus without complications (Multi) E11.9     States he is getting better control of his blood sugars, has had some low blood sugars, has a continuous glucose monitor, on Ozempic and insulin, will also start Farxiga at 5 mg for renal protection         Relevant Medications    dapagliflozin propanediol (Farxiga) 10 mg    Other Relevant Orders    Follow Up In Nephrology    Stage 3a chronic kidney disease (Multi) - Primary N18.31     Creatinine is stable at 1.15, blood pressure is well-controlled, last hemoglobin A1c 8.9, on Ozempic, has been able to decrease some of his dose of insulin secondary to some hypoglycemia, on metformin, will start Farxiga at 5 mg for renal protection, I have sent this to [facility] pharmacy as he does get some coverage for his medications through them         Relevant Medications    dapagliflozin propanediol (Farxiga) 10 mg    Other Relevant Orders    Follow Up In Nephrology    Comprehensive metabolic panel    Albumin-Creatinine Ratio, Urine Random    Urinalysis with Reflex Microscopic     Hypertension  Chronic kidney disease stage II/IIIa with baseline creatinine 1.1-1.4  Diabetes mellitus type 2  Obesity       Adela Arboleda, APRN-CNP, DNP 12/12/24 11:20 AM

## 2024-12-12 NOTE — ASSESSMENT & PLAN NOTE
Blood pressure is well-controlled on multiple medications including clonidine, losartan, metoprolol, diltiazem, and hydrochlorothiazide

## 2024-12-13 PROCEDURE — RXMED WILLOW AMBULATORY MEDICATION CHARGE

## 2024-12-16 ENCOUNTER — PHARMACY VISIT (OUTPATIENT)
Dept: PHARMACY | Facility: CLINIC | Age: 69
End: 2024-12-16
Payer: COMMERCIAL

## 2024-12-23 ENCOUNTER — TELEPHONE (OUTPATIENT)
Age: 69
End: 2024-12-23
Payer: MEDICARE

## 2024-12-23 DIAGNOSIS — E11.9 TYPE 2 DIABETES MELLITUS WITHOUT COMPLICATION, WITH LONG-TERM CURRENT USE OF INSULIN (MULTI): Primary | ICD-10-CM

## 2024-12-23 DIAGNOSIS — Z79.4 TYPE 2 DIABETES MELLITUS WITHOUT COMPLICATION, WITH LONG-TERM CURRENT USE OF INSULIN (MULTI): Primary | ICD-10-CM

## 2024-12-23 DIAGNOSIS — E66.01 OBESITY, MORBID, BMI 40.0-49.9 (MULTI): ICD-10-CM

## 2024-12-24 ENCOUNTER — TELEPHONE (OUTPATIENT)
Age: 69
End: 2024-12-24
Payer: MEDICARE

## 2024-12-24 DIAGNOSIS — E11.9 TYPE 2 DIABETES MELLITUS WITHOUT COMPLICATION, WITH LONG-TERM CURRENT USE OF INSULIN (MULTI): ICD-10-CM

## 2024-12-24 DIAGNOSIS — E66.01 OBESITY, MORBID, BMI 40.0-49.9 (MULTI): ICD-10-CM

## 2024-12-24 DIAGNOSIS — Z79.4 TYPE 2 DIABETES MELLITUS WITHOUT COMPLICATION, WITH LONG-TERM CURRENT USE OF INSULIN (MULTI): ICD-10-CM

## 2024-12-26 DIAGNOSIS — Z79.4 TYPE 2 DIABETES MELLITUS WITHOUT COMPLICATION, WITH LONG-TERM CURRENT USE OF INSULIN (MULTI): ICD-10-CM

## 2024-12-26 DIAGNOSIS — E11.9 TYPE 2 DIABETES MELLITUS WITHOUT COMPLICATION, WITH LONG-TERM CURRENT USE OF INSULIN (MULTI): ICD-10-CM

## 2024-12-26 DIAGNOSIS — E66.01 OBESITY, MORBID, BMI 40.0-49.9 (MULTI): ICD-10-CM

## 2024-12-26 PROCEDURE — RXMED WILLOW AMBULATORY MEDICATION CHARGE

## 2024-12-30 ENCOUNTER — PHARMACY VISIT (OUTPATIENT)
Dept: PHARMACY | Facility: CLINIC | Age: 69
End: 2024-12-30
Payer: COMMERCIAL

## 2024-12-30 NOTE — TELEPHONE ENCOUNTER
PATIENT BROUGHT IN BLOOD GLUCOSE READINGS. THEY ARE SCANNED IN THE CHART UNDER MEDIA  
The only blood sugar log that I see is the one from 19 December.  I had written some instructions on there for the patient.  
They are scanned on 12/17. It is the start of the readings  
You can access the [facility], offered by [facility], by registering with the following website: [email or web address]

## 2025-01-02 ENCOUNTER — APPOINTMENT (OUTPATIENT)
Dept: PRIMARY CARE | Facility: CLINIC | Age: 70
End: 2025-01-02
Payer: MEDICARE

## 2025-01-06 ENCOUNTER — TELEPHONE (OUTPATIENT)
Age: 70
End: 2025-01-06
Payer: MEDICARE

## 2025-01-06 NOTE — TELEPHONE ENCOUNTER
His wife has a urinary tract infection that is being caused by E. coli, this is a common cause for urinary tract infections.  It is not however contagious.  There are a lot of viruses circulating through the community currently, if his symptoms have started in the past few days, I do be highly suspicious that he might be developing a viral illness.  How are his blood sugars, and does he need to be seen in the office?

## 2025-01-06 NOTE — TELEPHONE ENCOUNTER
Pt called in stated that he has not been feeling well and was not sure if it had anything to do with Farxiga possibly depleting his K. Or if he could have caught Ecoli from wife. Pt states that wife came in to see Elyssa and found out she had Ecoli. Please advise

## 2025-01-16 ENCOUNTER — APPOINTMENT (OUTPATIENT)
Age: 70
End: 2025-01-16
Payer: MEDICARE

## 2025-01-16 ENCOUNTER — LAB (OUTPATIENT)
Facility: LAB | Age: 70
End: 2025-01-16
Payer: MEDICARE

## 2025-01-16 VITALS
HEIGHT: 63 IN | DIASTOLIC BLOOD PRESSURE: 90 MMHG | WEIGHT: 249.4 LBS | BODY MASS INDEX: 44.19 KG/M2 | OXYGEN SATURATION: 96 % | HEART RATE: 79 BPM | SYSTOLIC BLOOD PRESSURE: 140 MMHG

## 2025-01-16 DIAGNOSIS — E66.01 OBESITY, MORBID, BMI 40.0-49.9 (MULTI): ICD-10-CM

## 2025-01-16 DIAGNOSIS — Z79.4 TYPE 2 DIABETES MELLITUS WITHOUT COMPLICATION, WITH LONG-TERM CURRENT USE OF INSULIN (MULTI): ICD-10-CM

## 2025-01-16 DIAGNOSIS — E11.9 TYPE 2 DIABETES MELLITUS WITHOUT COMPLICATION, WITH LONG-TERM CURRENT USE OF INSULIN (MULTI): Primary | ICD-10-CM

## 2025-01-16 DIAGNOSIS — E53.8 B12 DEFICIENCY: ICD-10-CM

## 2025-01-16 DIAGNOSIS — N18.31 STAGE 3A CHRONIC KIDNEY DISEASE (MULTI): ICD-10-CM

## 2025-01-16 DIAGNOSIS — K21.9 GASTROESOPHAGEAL REFLUX DISEASE WITHOUT ESOPHAGITIS: ICD-10-CM

## 2025-01-16 DIAGNOSIS — E78.5 DYSLIPIDEMIA: ICD-10-CM

## 2025-01-16 DIAGNOSIS — E11.22 TYPE 2 DIABETES MELLITUS WITH STAGE 3A CHRONIC KIDNEY DISEASE, WITHOUT LONG-TERM CURRENT USE OF INSULIN (MULTI): ICD-10-CM

## 2025-01-16 DIAGNOSIS — E11.9 TYPE 2 DIABETES MELLITUS WITHOUT COMPLICATION, WITH LONG-TERM CURRENT USE OF INSULIN (MULTI): ICD-10-CM

## 2025-01-16 DIAGNOSIS — N18.31 TYPE 2 DIABETES MELLITUS WITH STAGE 3A CHRONIC KIDNEY DISEASE, WITHOUT LONG-TERM CURRENT USE OF INSULIN (MULTI): ICD-10-CM

## 2025-01-16 DIAGNOSIS — Z79.4 TYPE 2 DIABETES MELLITUS WITHOUT COMPLICATION, WITH LONG-TERM CURRENT USE OF INSULIN (MULTI): Primary | ICD-10-CM

## 2025-01-16 DIAGNOSIS — D64.9 ANEMIA, UNSPECIFIED TYPE: ICD-10-CM

## 2025-01-16 DIAGNOSIS — M17.11 ARTHRITIS OF RIGHT KNEE: ICD-10-CM

## 2025-01-16 DIAGNOSIS — H60.61 CHRONIC OTITIS EXTERNA OF RIGHT EAR, UNSPECIFIED TYPE: ICD-10-CM

## 2025-01-16 DIAGNOSIS — I10 BENIGN ESSENTIAL HYPERTENSION: ICD-10-CM

## 2025-01-16 LAB
ALBUMIN SERPL BCP-MCNC: 4.3 G/DL (ref 3.4–5)
ALP SERPL-CCNC: 46 U/L (ref 33–136)
ALT SERPL W P-5'-P-CCNC: 15 U/L (ref 10–52)
ANION GAP SERPL CALC-SCNC: 11 MMOL/L (ref 10–20)
AST SERPL W P-5'-P-CCNC: 17 U/L (ref 9–39)
BASOPHILS # BLD AUTO: 0.04 X10*3/UL (ref 0–0.1)
BASOPHILS NFR BLD AUTO: 0.5 %
BILIRUB SERPL-MCNC: 0.5 MG/DL (ref 0–1.2)
BUN SERPL-MCNC: 17 MG/DL (ref 6–23)
CALCIUM SERPL-MCNC: 9.9 MG/DL (ref 8.6–10.3)
CHLORIDE SERPL-SCNC: 104 MMOL/L (ref 98–107)
CHOLEST SERPL-MCNC: 106 MG/DL (ref 0–199)
CHOLESTEROL/HDL RATIO: 2.9
CO2 SERPL-SCNC: 30 MMOL/L (ref 21–32)
CREAT SERPL-MCNC: 1.32 MG/DL (ref 0.5–1.3)
EGFRCR SERPLBLD CKD-EPI 2021: 59 ML/MIN/1.73M*2
EOSINOPHIL # BLD AUTO: 0.13 X10*3/UL (ref 0–0.7)
EOSINOPHIL NFR BLD AUTO: 1.8 %
ERYTHROCYTE [DISTWIDTH] IN BLOOD BY AUTOMATED COUNT: 13.2 % (ref 11.5–14.5)
EST. AVERAGE GLUCOSE BLD GHB EST-MCNC: 146 MG/DL
FERRITIN SERPL-MCNC: 53 NG/ML (ref 20–300)
GLUCOSE SERPL-MCNC: 86 MG/DL (ref 74–99)
HBA1C MFR BLD: 6.7 %
HCT VFR BLD AUTO: 43.7 % (ref 41–52)
HDLC SERPL-MCNC: 37 MG/DL
HGB BLD-MCNC: 14.4 G/DL (ref 13.5–17.5)
IMM GRANULOCYTES # BLD AUTO: 0.02 X10*3/UL (ref 0–0.7)
IMM GRANULOCYTES NFR BLD AUTO: 0.3 % (ref 0–0.9)
LDLC SERPL CALC-MCNC: 33 MG/DL
LYMPHOCYTES # BLD AUTO: 1.47 X10*3/UL (ref 1.2–4.8)
LYMPHOCYTES NFR BLD AUTO: 19.9 %
MCH RBC QN AUTO: 29.3 PG (ref 26–34)
MCHC RBC AUTO-ENTMCNC: 33 G/DL (ref 32–36)
MCV RBC AUTO: 89 FL (ref 80–100)
MONOCYTES # BLD AUTO: 0.51 X10*3/UL (ref 0.1–1)
MONOCYTES NFR BLD AUTO: 6.9 %
NEUTROPHILS # BLD AUTO: 5.2 X10*3/UL (ref 1.2–7.7)
NEUTROPHILS NFR BLD AUTO: 70.6 %
NON HDL CHOLESTEROL: 69 MG/DL (ref 0–149)
NRBC BLD-RTO: 0 /100 WBCS (ref 0–0)
PLATELET # BLD AUTO: 208 X10*3/UL (ref 150–450)
POTASSIUM SERPL-SCNC: 4 MMOL/L (ref 3.5–5.3)
PROT SERPL-MCNC: 7.2 G/DL (ref 6.4–8.2)
RBC # BLD AUTO: 4.91 X10*6/UL (ref 4.5–5.9)
SODIUM SERPL-SCNC: 141 MMOL/L (ref 136–145)
TRIGL SERPL-MCNC: 178 MG/DL (ref 0–149)
VIT B12 SERPL-MCNC: >7500 PG/ML (ref 211–911)
VLDL: 36 MG/DL (ref 0–40)
WBC # BLD AUTO: 7.4 X10*3/UL (ref 4.4–11.3)

## 2025-01-16 PROCEDURE — 83036 HEMOGLOBIN GLYCOSYLATED A1C: CPT

## 2025-01-16 PROCEDURE — 4010F ACE/ARB THERAPY RXD/TAKEN: CPT | Performed by: FAMILY MEDICINE

## 2025-01-16 PROCEDURE — 1160F RVW MEDS BY RX/DR IN RCRD: CPT | Performed by: FAMILY MEDICINE

## 2025-01-16 PROCEDURE — 80053 COMPREHEN METABOLIC PANEL: CPT

## 2025-01-16 PROCEDURE — 82728 ASSAY OF FERRITIN: CPT

## 2025-01-16 PROCEDURE — 1036F TOBACCO NON-USER: CPT | Performed by: FAMILY MEDICINE

## 2025-01-16 PROCEDURE — 3048F LDL-C <100 MG/DL: CPT | Performed by: FAMILY MEDICINE

## 2025-01-16 PROCEDURE — 80061 LIPID PANEL: CPT

## 2025-01-16 PROCEDURE — RXMED WILLOW AMBULATORY MEDICATION CHARGE

## 2025-01-16 PROCEDURE — 3080F DIAST BP >= 90 MM HG: CPT | Performed by: FAMILY MEDICINE

## 2025-01-16 PROCEDURE — 3077F SYST BP >= 140 MM HG: CPT | Performed by: FAMILY MEDICINE

## 2025-01-16 PROCEDURE — 3008F BODY MASS INDEX DOCD: CPT | Performed by: FAMILY MEDICINE

## 2025-01-16 PROCEDURE — 20610 DRAIN/INJ JOINT/BURSA W/O US: CPT | Performed by: FAMILY MEDICINE

## 2025-01-16 PROCEDURE — 96372 THER/PROPH/DIAG INJ SC/IM: CPT | Performed by: FAMILY MEDICINE

## 2025-01-16 PROCEDURE — 85025 COMPLETE CBC W/AUTO DIFF WBC: CPT

## 2025-01-16 PROCEDURE — 82607 VITAMIN B-12: CPT

## 2025-01-16 PROCEDURE — 1159F MED LIST DOCD IN RCRD: CPT | Performed by: FAMILY MEDICINE

## 2025-01-16 PROCEDURE — 99214 OFFICE O/P EST MOD 30 MIN: CPT | Performed by: FAMILY MEDICINE

## 2025-01-16 RX ORDER — TRIAMCINOLONE ACETONIDE 40 MG/ML
40 INJECTION, SUSPENSION INTRA-ARTICULAR; INTRAMUSCULAR
Status: COMPLETED | OUTPATIENT
Start: 2025-01-16 | End: 2025-01-16

## 2025-01-16 RX ORDER — CYANOCOBALAMIN 1000 UG/ML
1000 INJECTION, SOLUTION INTRAMUSCULAR; SUBCUTANEOUS ONCE
Status: COMPLETED | OUTPATIENT
Start: 2025-01-16 | End: 2025-01-16

## 2025-01-16 RX ORDER — OMEPRAZOLE 20 MG/1
20 CAPSULE, DELAYED RELEASE ORAL DAILY
Qty: 30 CAPSULE | Refills: 11 | Status: SHIPPED | OUTPATIENT
Start: 2025-01-16 | End: 2026-01-16

## 2025-01-16 RX ORDER — CIPROFLOXACIN AND DEXAMETHASONE 3; 1 MG/ML; MG/ML
4 SUSPENSION/ DROPS AURICULAR (OTIC) 2 TIMES DAILY
Qty: 7.5 ML | Refills: 0 | Status: SHIPPED | OUTPATIENT
Start: 2025-01-16 | End: 2025-01-23

## 2025-01-16 RX ORDER — EZETIMIBE 10 MG/1
10 TABLET ORAL DAILY
Qty: 90 TABLET | Refills: 3 | Status: SHIPPED | OUTPATIENT
Start: 2025-01-16 | End: 2026-01-16

## 2025-01-16 RX ORDER — INSULIN ASPART INJECTION 100 [IU]/ML
14 INJECTION, SOLUTION SUBCUTANEOUS 3 TIMES DAILY
Qty: 30 ML | Refills: 3 | Status: SHIPPED | OUTPATIENT
Start: 2025-01-16 | End: 2025-01-17 | Stop reason: SDUPTHER

## 2025-01-16 RX ADMIN — CYANOCOBALAMIN 1000 MCG: 1000 INJECTION, SOLUTION INTRAMUSCULAR; SUBCUTANEOUS at 14:52

## 2025-01-16 RX ADMIN — TRIAMCINOLONE ACETONIDE 40 MG: 40 INJECTION, SUSPENSION INTRA-ARTICULAR; INTRAMUSCULAR at 17:12

## 2025-01-16 ASSESSMENT — ENCOUNTER SYMPTOMS
DIZZINESS: 0
SEIZURES: 0
RHINORRHEA: 0
DIARRHEA: 0
WEAKNESS: 1
BLACKOUTS: 0
HEADACHES: 0
POLYPHAGIA: 0
WHEEZING: 0
TREMORS: 1
TROUBLE SWALLOWING: 0
SPEECH DIFFICULTY: 0
DIFFICULTY URINATING: 0
BLURRED VISION: 1
ABDOMINAL PAIN: 0
NERVOUS/ANXIOUS: 0
LIGHT-HEADEDNESS: 0
POLYDIPSIA: 0
SHORTNESS OF BREATH: 0
UNEXPECTED WEIGHT CHANGE: 0
VISUAL CHANGE: 1
ARTHRALGIAS: 0
COUGH: 0
CONFUSION: 1
VOMITING: 0
HUNGER: 0
APPETITE CHANGE: 0
ABDOMINAL DISTENTION: 0
ADENOPATHY: 0
PALPITATIONS: 0
FATIGUE: 1
WEIGHT LOSS: 0
NUMBNESS: 0
NAUSEA: 0
CONSTIPATION: 0
SWEATS: 1
ACTIVITY CHANGE: 0

## 2025-01-16 NOTE — ASSESSMENT & PLAN NOTE
Add low-dose PPI, no issues with dysphagia but having frequent dyspepsia.  Advised about diet modifications.

## 2025-01-16 NOTE — ASSESSMENT & PLAN NOTE
Continue with low-dose rosuvastatin since he is tolerating this, await blood testing for liver function testing.

## 2025-01-16 NOTE — PATIENT INSTRUCTIONS

## 2025-01-16 NOTE — PROGRESS NOTES
Subjective   Patient ID: NITA Bustos is a 69 y.o. male who presents for 3 MO CK.    Diabetes  He has type 2 diabetes mellitus. No MedicAlert identification noted. The initial diagnosis of diabetes was made 10 years ago. Hypoglycemia symptoms include confusion, sleepiness, sweats and tremors. Pertinent negatives for hypoglycemia include no dizziness, headaches, hunger, mood changes, nervousness/anxiousness, pallor, seizures or speech difficulty. Associated symptoms include blurred vision, fatigue, visual change and weakness. Pertinent negatives for diabetes include no chest pain, no foot paresthesias, no foot ulcerations, no polydipsia, no polyphagia, no polyuria and no weight loss. Hypoglycemia complications include nocturnal hypoglycemia. Pertinent negatives for hypoglycemia complications include no blackouts, no hospitalization, no required assistance and no required glucagon injection. Symptoms are worsening. Diabetic complications include nephropathy and retinopathy. Pertinent negatives for diabetic complications include no CVA, heart disease, impotence, peripheral neuropathy or PVD. Risk factors for coronary artery disease include dyslipidemia, family history, hypertension, obesity, sedentary lifestyle and stress. Current diabetic treatment includes insulin injections and oral agent (triple therapy). He is compliant with treatment most of the time. He is currently taking insulin pre-breakfast, pre-lunch and pre-dinner. Insulin injections are given by patient. Rotation sites for injection include the abdominal wall. His weight is fluctuating minimally. He is following a generally unhealthy diet. When asked about meal planning, he reported none. He has not had a previous visit with a dietitian. He never participates in exercise. He monitors blood glucose at home 5+ x per day. He monitors urine at home <1 x per month. Blood glucose monitoring compliance is good. His home blood glucose trend is decreasing  steadily. His breakfast blood glucose is taken between 9-10 am. His breakfast blood glucose range is generally  mg/dl. His lunch blood glucose is taken between 1-2 pm. His lunch blood glucose range is generally 140-180 mg/dl. His dinner blood glucose is taken between 5-6 pm. His dinner blood glucose range is generally 140-180 mg/dl. His bedtime blood glucose is taken after 11 pm. His bedtime blood glucose range is generally 140-180 mg/dl. His overall blood glucose range is 130-140 mg/dl. He does not see a podiatrist.Eye exam is current.      No headache, chest pain, shortness of breath, dizziness, lightheadedness, or edema  No low blood sugars since last OV, seen opthalmology in the past year, and no numbness or tingling in feet, skin normal.  Seen nephrology in December  Some nausea and gas after Ozempic shot, having some LBS in AM, using 45 U bid of Basaglar, some skipping meal time insulin due to morning LBS  Some constipation at times  Some heartburn at times  No skin issues  Right knee pain, last injection done in June  Some vertigo in AM in the last week, worse when lays down or gets up, some ear itch and pain  Seen opthmology in the past month, negative evaluation, cataracts getting worse, retinopathy stable  Started Farxiga last month, clinical pharmacy following at the hospital    Review of Systems   Constitutional:  Positive for fatigue. Negative for activity change, appetite change, unexpected weight change and weight loss.   HENT:  Negative for ear pain, nosebleeds, rhinorrhea, sneezing and trouble swallowing.    Eyes:  Positive for blurred vision.   Respiratory:  Negative for cough, shortness of breath and wheezing.    Cardiovascular:  Negative for chest pain, palpitations and leg swelling.   Gastrointestinal:  Negative for abdominal distention, abdominal pain, constipation, diarrhea, nausea and vomiting.   Endocrine: Negative for polydipsia, polyphagia and polyuria.   Genitourinary:  Negative for  "difficulty urinating and impotence.   Musculoskeletal:  Negative for arthralgias.   Skin:  Negative for pallor and rash.   Neurological:  Positive for tremors and weakness. Negative for dizziness, seizures, speech difficulty, light-headedness, numbness and headaches.   Hematological:  Negative for adenopathy.   Psychiatric/Behavioral:  Positive for confusion. Negative for behavioral problems. The patient is not nervous/anxious.    All other systems reviewed and are negative.        Objective   /90   Pulse 79   Ht 1.6 m (5' 3\")   Wt 113 kg (249 lb 6.4 oz)   SpO2 96%   BMI 44.18 kg/m²     Physical Exam  Vitals and nursing note reviewed.   Constitutional:       Appearance: Normal appearance.   HENT:      Head: Normocephalic and atraumatic.      Right Ear: Tympanic membrane, ear canal and external ear normal.      Left Ear: Tympanic membrane, ear canal and external ear normal.      Nose: Nose normal.      Mouth/Throat:      Mouth: Mucous membranes are moist.      Pharynx: Oropharynx is clear.   Cardiovascular:      Rate and Rhythm: Normal rate and regular rhythm.      Pulses: Normal pulses.      Heart sounds: Normal heart sounds.   Pulmonary:      Effort: Pulmonary effort is normal.      Breath sounds: Normal breath sounds.   Musculoskeletal:      Cervical back: Normal range of motion and neck supple.   Skin:     General: Skin is warm and dry.      Capillary Refill: Capillary refill takes less than 2 seconds.   Neurological:      Mental Status: He is alert.   Psychiatric:         Mood and Affect: Mood normal.         Behavior: Behavior normal.     Joint Injection Large/Arthrocentesis: R knee on 1/16/2025 5:12 PM  Indications: pain  Details: 25 G needle, anterolateral approach  Medications: 40 mg triamcinolone acetonide 40 mg/mL  Outcome: tolerated well, no immediate complications    2 cc 0.5% bupivacaine.  Procedure, treatment alternatives, risks and benefits explained, specific risks discussed. Consent was " given by the patient.             Assessment/Plan   Problem List Items Addressed This Visit             ICD-10-CM    Benign essential hypertension I10     Blood pressure stable, await blood testing for renal and electrolytes.  Continue with current medications.         Relevant Orders    Follow Up In Primary Care - Established    Type 2 diabetes mellitus without complications (Multi) - Primary E11.9     Nephrology recently added SGL 2, getting assistance to clinical pharmacy, tolerating GLP-1 100 mg a week with semaglutide, continue with mealtime insulin as needed basal insulin, metformin at current dosing, await A1c testing today.         Relevant Medications    semaglutide (OZEMPIC) 1 mg/dose (4 mg/3 mL) pen injector    insulin aspart, with niacinamide, (Fiasp FlexTouch U-100 Insulin) 100 unit/mL (3 mL) injection    Other Relevant Orders    Follow Up In Primary Care - Established    Dyslipidemia E78.5     Continue with low-dose rosuvastatin since he is tolerating this, await blood testing for liver function testing.         Relevant Medications    ezetimibe (Zetia) 10 mg tablet    Other Relevant Orders    Follow Up In Primary Care - Established    Obesity, morbid, BMI 40.0-49.9 (Multi) E66.01     Weight is gradually decreasing, encouraged about diet and exercise.         Relevant Medications    semaglutide (OZEMPIC) 1 mg/dose (4 mg/3 mL) pen injector    Other Relevant Orders    Follow Up In Primary Care - Established    Stage 3a chronic kidney disease (Multi) N18.31     Blood pressure under good control, started SGL 2, continue with current medications including angiotensin receptor blocker, await creatinine.         Relevant Orders    Follow Up In Primary Care - Established    Arthritis of right knee M17.11     Injected right knee today, tolerated well.         Relevant Medications    omeprazole (PriLOSEC) 20 mg DR capsule    Other Relevant Orders    Follow Up In Primary Care - Established    Gastroesophageal  reflux disease without esophagitis K21.9     Add low-dose PPI, no issues with dysphagia but having frequent dyspepsia.  Advised about diet modifications.         Relevant Medications    omeprazole (PriLOSEC) 20 mg DR capsule    Other Relevant Orders    Follow Up In Primary Care - Established     Other Visit Diagnoses         Codes    Type 2 diabetes mellitus with stage 3a chronic kidney disease, without long-term current use of insulin (Multi)     E11.22, N18.31    Relevant Medications    insulin aspart, with niacinamide, (Fiasp FlexTouch U-100 Insulin) 100 unit/mL (3 mL) injection    Other Relevant Orders    Follow Up In Primary Care - Established    B12 deficiency     E53.8    Relevant Medications    cyanocobalamin (Vitamin B-12) injection 1,000 mcg (Completed)    Other Relevant Orders    Follow Up In Primary Care - Established    Anemia, unspecified type     D64.9    Relevant Orders    Follow Up In Primary Care - Established    Chronic otitis externa of right ear, unspecified type     H60.61    Relevant Medications    ciprofloxacin-dexamethasone (CiproDEX) otic suspension    Other Relevant Orders    Follow Up In Primary Care - Established             Patient was identified as a fall risk. Risk prevention instructions provided.

## 2025-01-16 NOTE — ASSESSMENT & PLAN NOTE
Blood pressure stable, await blood testing for renal and electrolytes.  Continue with current medications.

## 2025-01-16 NOTE — ASSESSMENT & PLAN NOTE
Blood pressure under good control, started SGL 2, continue with current medications including angiotensin receptor blocker, await creatinine.

## 2025-01-16 NOTE — ASSESSMENT & PLAN NOTE
Nephrology recently added SGL 2, getting assistance to clinical pharmacy, tolerating GLP-1 100 mg a week with semaglutide, continue with mealtime insulin as needed basal insulin, metformin at current dosing, await A1c testing today.

## 2025-01-17 ENCOUNTER — TELEPHONE (OUTPATIENT)
Age: 70
End: 2025-01-17
Payer: MEDICARE

## 2025-01-17 DIAGNOSIS — E11.22 TYPE 2 DIABETES MELLITUS WITH STAGE 3A CHRONIC KIDNEY DISEASE, WITHOUT LONG-TERM CURRENT USE OF INSULIN (MULTI): ICD-10-CM

## 2025-01-17 DIAGNOSIS — E53.8 B12 DEFICIENCY: ICD-10-CM

## 2025-01-17 DIAGNOSIS — Z79.4 TYPE 2 DIABETES MELLITUS WITHOUT COMPLICATION, WITH LONG-TERM CURRENT USE OF INSULIN (MULTI): Primary | ICD-10-CM

## 2025-01-17 DIAGNOSIS — E11.9 TYPE 2 DIABETES MELLITUS WITHOUT COMPLICATION, WITH LONG-TERM CURRENT USE OF INSULIN (MULTI): ICD-10-CM

## 2025-01-17 DIAGNOSIS — N18.31 TYPE 2 DIABETES MELLITUS WITH STAGE 3A CHRONIC KIDNEY DISEASE, WITHOUT LONG-TERM CURRENT USE OF INSULIN (MULTI): ICD-10-CM

## 2025-01-17 DIAGNOSIS — Z79.4 TYPE 2 DIABETES MELLITUS WITHOUT COMPLICATION, WITH LONG-TERM CURRENT USE OF INSULIN (MULTI): ICD-10-CM

## 2025-01-17 DIAGNOSIS — E11.9 TYPE 2 DIABETES MELLITUS WITHOUT COMPLICATION, WITH LONG-TERM CURRENT USE OF INSULIN (MULTI): Primary | ICD-10-CM

## 2025-01-17 RX ORDER — INSULIN ASPART INJECTION 100 [IU]/ML
14 INJECTION, SOLUTION SUBCUTANEOUS 3 TIMES DAILY
Qty: 30 ML | Refills: 3 | Status: SHIPPED | OUTPATIENT
Start: 2025-01-17 | End: 2026-01-17

## 2025-01-17 NOTE — RESULT ENCOUNTER NOTE
He should not make any adjustments with his B12, the level was recorded is high because it was done after he had just gotten his B12 shot.  Make sure that he gets his blood testing done before his next appointment in 3 months from now.

## 2025-01-17 NOTE — TELEPHONE ENCOUNTER
MD Zita Mckeon MA  He should not make any adjustments with his B12, the level was recorded is high because it was done after he had just gotten his B12 shot.  Make sure that he gets his blood testing done before his next appointment in 3 months from now.    PATIENT NOTIFIED. VERBALIZED UNDERSTANDING. WILL HAVE LAB WORK DONE PRIOR TO NEXT APPOINTMENT IN 3 MONTHS

## 2025-01-17 NOTE — TELEPHONE ENCOUNTER
Matthew Rodríguez MD  [id number] [place] [id number] Clinical Support Pool  Please let the patient know that his blood testing looks good.  His A1c testing is now only 6.7.  His kidney function has declined slightly, I would like him to try holding his hydrochlorothiazide for now.  I will place an order for him to have blood testing again before his next appointment in 3 months from now.    PATIENT NOTIFIED. VERBALIZED UNDERSTANDING. WILL COMPLY AND STOP THE hydrochlorothiazide FOR NOW. AND, WILL HAVE LAB WORK IN 3 MONTHS.

## 2025-01-17 NOTE — RESULT ENCOUNTER NOTE
Please let the patient know that his blood testing looks good.  His A1c testing is now only 6.7.  His kidney function has declined slightly, I would like him to try holding his hydrochlorothiazide for now.  I will place an order for him to have blood testing again before his next appointment in 3 months from now.

## 2025-01-22 ENCOUNTER — PHARMACY VISIT (OUTPATIENT)
Dept: PHARMACY | Facility: CLINIC | Age: 70
End: 2025-01-22
Payer: COMMERCIAL

## 2025-01-30 PROCEDURE — RXMED WILLOW AMBULATORY MEDICATION CHARGE

## 2025-01-31 ENCOUNTER — PHARMACY VISIT (OUTPATIENT)
Dept: PHARMACY | Facility: CLINIC | Age: 70
End: 2025-01-31
Payer: COMMERCIAL

## 2025-02-03 DIAGNOSIS — E11.9 TYPE 2 DIABETES MELLITUS WITHOUT COMPLICATION, WITH LONG-TERM CURRENT USE OF INSULIN (MULTI): Primary | ICD-10-CM

## 2025-02-03 DIAGNOSIS — Z79.4 TYPE 2 DIABETES MELLITUS WITHOUT COMPLICATION, WITH LONG-TERM CURRENT USE OF INSULIN (MULTI): Primary | ICD-10-CM

## 2025-02-03 RX ORDER — INSULIN ASPART 100 [IU]/ML
14 INJECTION, SOLUTION INTRAVENOUS; SUBCUTANEOUS
Qty: 15 ML | Refills: 12 | Status: SHIPPED | OUTPATIENT
Start: 2025-02-03 | End: 2026-02-03

## 2025-02-04 ENCOUNTER — TELEPHONE (OUTPATIENT)
Age: 70
End: 2025-02-04
Payer: MEDICARE

## 2025-02-07 DIAGNOSIS — E11.9 TYPE 2 DIABETES MELLITUS WITHOUT COMPLICATION, WITH LONG-TERM CURRENT USE OF INSULIN (MULTI): ICD-10-CM

## 2025-02-07 DIAGNOSIS — Z79.4 TYPE 2 DIABETES MELLITUS WITHOUT COMPLICATION, WITH LONG-TERM CURRENT USE OF INSULIN (MULTI): ICD-10-CM

## 2025-02-07 DIAGNOSIS — E66.01 OBESITY, MORBID, BMI 40.0-49.9 (MULTI): ICD-10-CM

## 2025-02-07 RX ORDER — SEMAGLUTIDE 1.34 MG/ML
1 INJECTION, SOLUTION SUBCUTANEOUS WEEKLY
Qty: 3 ML | Refills: 1 | Status: SHIPPED | OUTPATIENT
Start: 2025-02-07 | End: 2025-04-04

## 2025-02-17 ENCOUNTER — TELEPHONE (OUTPATIENT)
Age: 70
End: 2025-02-17

## 2025-02-17 DIAGNOSIS — E11.22 TYPE 2 DIABETES MELLITUS WITH STAGE 3A CHRONIC KIDNEY DISEASE, WITHOUT LONG-TERM CURRENT USE OF INSULIN (MULTI): ICD-10-CM

## 2025-02-17 DIAGNOSIS — N18.31 TYPE 2 DIABETES MELLITUS WITH STAGE 3A CHRONIC KIDNEY DISEASE, WITHOUT LONG-TERM CURRENT USE OF INSULIN (MULTI): ICD-10-CM

## 2025-02-17 DIAGNOSIS — E11.9 TYPE 2 DIABETES MELLITUS WITHOUT COMPLICATION, WITH LONG-TERM CURRENT USE OF INSULIN (MULTI): Primary | ICD-10-CM

## 2025-02-17 DIAGNOSIS — Z79.4 TYPE 2 DIABETES MELLITUS WITHOUT COMPLICATION, WITH LONG-TERM CURRENT USE OF INSULIN (MULTI): Primary | ICD-10-CM

## 2025-02-19 NOTE — TELEPHONE ENCOUNTER
Patient called and states that his insurance will not cover the Basaglar insulin.  They would cover Glargine insulin pen or Degludec.    Please send on of these choices to the [facility] retail pharmacy in [place].

## 2025-02-20 RX ORDER — INSULIN GLARGINE 100 [IU]/ML
40 INJECTION, SOLUTION SUBCUTANEOUS 2 TIMES DAILY
Qty: 72 ML | Refills: 3 | Status: SHIPPED | OUTPATIENT
Start: 2025-02-20 | End: 2025-02-20 | Stop reason: SDUPTHER

## 2025-02-20 RX ORDER — INSULIN GLARGINE 100 [IU]/ML
40 INJECTION, SOLUTION SUBCUTANEOUS 2 TIMES DAILY
Qty: 72 ML | Refills: 3 | Status: SHIPPED | OUTPATIENT
Start: 2025-02-20 | End: 2025-02-21 | Stop reason: ALTCHOICE

## 2025-02-20 NOTE — TELEPHONE ENCOUNTER
I have switched his Basaglar to Lantus and sent this to his pharmacy.  I chose to use a slightly lower dose of 40 units twice daily so that we can avoid potential hypoglycemia but if he notices that his glucose levels are going up he can increase to 45 units twice a day.    Kristine Hackett MD

## 2025-02-21 ENCOUNTER — TELEMEDICINE (OUTPATIENT)
Dept: PHARMACY | Facility: HOSPITAL | Age: 70
End: 2025-02-21
Payer: MEDICARE

## 2025-02-21 DIAGNOSIS — Z79.4 TYPE 2 DIABETES MELLITUS WITHOUT COMPLICATION, WITH LONG-TERM CURRENT USE OF INSULIN (MULTI): Primary | ICD-10-CM

## 2025-02-21 DIAGNOSIS — Z79.4 TYPE 2 DIABETES MELLITUS WITHOUT COMPLICATION, WITH LONG-TERM CURRENT USE OF INSULIN (MULTI): ICD-10-CM

## 2025-02-21 DIAGNOSIS — N18.31 STAGE 3A CHRONIC KIDNEY DISEASE (MULTI): ICD-10-CM

## 2025-02-21 DIAGNOSIS — E11.9 TYPE 2 DIABETES MELLITUS WITHOUT COMPLICATION, WITH LONG-TERM CURRENT USE OF INSULIN (MULTI): Primary | ICD-10-CM

## 2025-02-21 DIAGNOSIS — E11.9 TYPE 2 DIABETES MELLITUS WITHOUT COMPLICATION, WITH LONG-TERM CURRENT USE OF INSULIN (MULTI): ICD-10-CM

## 2025-02-21 PROCEDURE — RXMED WILLOW AMBULATORY MEDICATION CHARGE

## 2025-02-21 RX ORDER — DAPAGLIFLOZIN 10 MG/1
10 TABLET, FILM COATED ORAL DAILY
Qty: 90 TABLET | Refills: 1 | Status: SHIPPED | OUTPATIENT
Start: 2025-02-21 | End: 2026-02-16

## 2025-02-21 RX ORDER — INSULIN DEGLUDEC 100 U/ML
40 INJECTION, SOLUTION SUBCUTANEOUS 2 TIMES DAILY
Qty: 75 ML | Refills: 1 | Status: SHIPPED | OUTPATIENT
Start: 2025-02-21 | End: 2026-02-21

## 2025-02-21 RX ORDER — INSULIN ASPART 100 [IU]/ML
14 INJECTION, SOLUTION INTRAVENOUS; SUBCUTANEOUS
Qty: 45 ML | Refills: 1 | Status: SHIPPED | OUTPATIENT
Start: 2025-02-21 | End: 2026-02-21

## 2025-02-21 RX ORDER — INSULIN GLARGINE 100 [IU]/ML
40 INJECTION, SOLUTION SUBCUTANEOUS 2 TIMES DAILY
Qty: 75 ML | Refills: 1 | Status: SHIPPED | OUTPATIENT
Start: 2025-02-21 | End: 2026-02-21

## 2025-02-21 NOTE — Clinical Note
This mutual patient has done such a wonderful job to get his A1c down. He reached out to me today that his insurance is no longer covering the Basaglar that we helped get for no -cost last year. I went ahead and saw insurance covers Tresiba and switched over to the same dosing you lowered his basal insulin with at his last visit with you. The big thing I told him is that with his follow ups with you in the future to monitor for further adjustments if needed based on now getting to a therapeutic dose of Ozempic/Farxiga.

## 2025-02-21 NOTE — PROGRESS NOTES
Patient ID: Av Bustos is a 69 y.o. male who presents for Diabetes.    Referring Provider: Matthew Rodríguez MD  PCP: Matthew Rodríguez MD     Subjective   Mr. Bustos returns to the pharmacy team for medication review for his T2DM. Patient previously was seen by endo for his care and in January of 2024 was deemed a candidate for GLP1 therapy. After having significant financial concerns, was off therapy majority of 2024. PCP connected patient to pharmacy team for access concerns and patient was able to get his DM meds restarted and at no cost in November 2024. Patient reports today through [facility] start with nephro, Ozempic re-start and insulin titration that his A1c has improved from 8.9% to 6.7%. With additional BG control coming from SGLT2 and GLP1, patient's basal/bolus regimen has decreased due to decreased insulin demands. Patient reports 10 lb weight loss on Ozempic, is hoping for further weight loss. Today his main concerns stem from insurance changes from 2024 to 2025 that have negatively impacted his insulin coverage. He mentions Basaglar is Nonformulary and a recent clarification that newly sent Lantus is also no longer covered --- but mentions Tresiba is covered under his PartD benefits. Is looking to continue care with new formulary restrictions. Is tolerating all medications well at this time, no hypoglycemia reported.     Objective     There were no vitals taken for this visit.   BP Readings from Last 4 Encounters:   01/16/25 140/90   12/12/24 142/80   10/29/24 144/86   10/15/24 (!) 154/100      There were no vitals filed for this visit.     Labs  Lab Results   Component Value Date    BILITOT 0.5 01/16/2025    CALCIUM 9.9 01/16/2025    CO2 30 01/16/2025     01/16/2025    CREATININE 1.32 (H) 01/16/2025    GLUCOSE 86 01/16/2025    ALKPHOS 46 01/16/2025    K 4.0 01/16/2025    PROT 7.2 01/16/2025     01/16/2025    AST 17 01/16/2025    ALT 15 01/16/2025    BUN 17 01/16/2025     ANIONGAP 11 01/16/2025    MG 1.59 (L) 03/19/2024    PHOS 3.7 08/22/2023    ALBUMIN 4.3 01/16/2025    GFRMALE 83 09/12/2023     Lab Results   Component Value Date    TRIG 178 (H) 01/16/2025    CHOL 106 01/16/2025    LDLCALC 33 01/16/2025    HDL 37.0 01/16/2025     Lab Results   Component Value Date    HGBA1C 6.7 (H) 01/16/2025       Current Outpatient Medications   Medication Instructions    cholecalciferol, vitamin D3, (VITAMIN D3 ORAL) 1 tablet, Daily    cloNIDine (CATAPRES) 0.2 mg, oral, Every 8 hours    dilTIAZem CD (CARDIZEM CD) 240 mg, oral, Daily    ezetimibe (ZETIA) 10 mg, oral, Daily    Farxiga 10 mg, oral, Daily    FLUoxetine (PROzac) 10 mg capsule 1 capsule, Daily (0630)    FreeStyle Maya 2 Sensor kit 1 each, subcutaneous, Every 14 days    hydroCHLOROthiazide (HYDRODIURIL) 25 mg, oral, Daily    insulin aspart (NOVOLOG FLEXPEN U-100 INSULIN) 14 Units, subcutaneous, 3 times daily before meals, Take as directed per insulin instructions.    insulin aspart, with niacinamide, (Fiasp FlexTouch U-100 Insulin) 100 unit/mL (3 mL) injection 14 Units, subcutaneous, 3 times daily, Inject up to 30 units three times daily before meals as directed up to a max of 90 units/day    insulin glargine (LANTUS) 40 Units, subcutaneous, 2 times daily    LORazepam (ATIVAN) 0.5 mg, oral, Every 6 hours PRN    losartan (COZAAR) 100 mg, oral, Daily    metFORMIN (GLUCOPHAGE) 1,000 mg, oral, 2 times daily (morning and late afternoon)    metoprolol succinate XL (TOPROL XL) 100 mg, oral, Daily, Do not crush or chew.    multivit-min-folic-vit K-lycop 400- mcg tablet     omeprazole (PRILOSEC) 20 mg, oral, Daily, Do not crush or chew.    OneTouch Delica Plus Lancet 33 gauge misc CHECK BLOOD SUGARS 3 TIMES A DAY    OneTouch Ultra Test strip CHECK BLOOD SUGAR THREE TIMES DAILY    Ozempic 1 mg, subcutaneous, Weekly    pioglitazone (ACTOS) 15 mg, oral, Daily    rosuvastatin (Crestor) 5 mg tablet Nightly    Slow-Mag 71.5 mg tablet,delayed  release (DR/EC) 1 tablet, Daily (0630)         Assessment/Plan   Problem List Items Addressed This Visit             ICD-10-CM    Type 2 diabetes mellitus without complications (Multi) E11.9   Switch taking Basaglar 45 units BID to Lantus 40 units BID  Continue taking Ozempic 1 mg subcutaneous weekly   Continue taking metformin 1000 mg BID  Continue taking Novolog up to 30 units TIDAC  Goal at next follow up visit with PCP --- monitor BG and adjust basal insulin/Ozempic if needed      Follow-up: as needed with concerns, if hypoglycemia occurs     Time spent with pt: Total length of time 20 (minutes) of the encounter and more than 50% was spent counseling the patient.    Raghav Smith, PharmD    Continue all meds under the continuation of care with the referring provider and clinical pharmacy team.

## 2025-02-22 ENCOUNTER — PHARMACY VISIT (OUTPATIENT)
Dept: PHARMACY | Facility: CLINIC | Age: 70
End: 2025-02-22
Payer: COMMERCIAL

## 2025-02-24 ENCOUNTER — PHARMACY VISIT (OUTPATIENT)
Dept: PHARMACY | Facility: CLINIC | Age: 70
End: 2025-02-24

## 2025-03-10 PROCEDURE — RXMED WILLOW AMBULATORY MEDICATION CHARGE

## 2025-03-11 ENCOUNTER — PHARMACY VISIT (OUTPATIENT)
Dept: PHARMACY | Facility: CLINIC | Age: 70
End: 2025-03-11
Payer: COMMERCIAL

## 2025-03-21 ENCOUNTER — TELEPHONE (OUTPATIENT)
Age: 70
End: 2025-03-21
Payer: MEDICARE

## 2025-03-21 NOTE — TELEPHONE ENCOUNTER
Can you addend the January OV note to say he uses the Freestyle drew 2 sensor to monitor his blood glucose levels. It has to mention the sensors.

## 2025-03-24 DIAGNOSIS — K04.7 DENTAL ABSCESS: Primary | ICD-10-CM

## 2025-03-24 DIAGNOSIS — I1A.0 RESISTANT HYPERTENSION: Primary | ICD-10-CM

## 2025-03-24 RX ORDER — CLONIDINE HYDROCHLORIDE 0.2 MG/1
0.2 TABLET ORAL EVERY 8 HOURS
Qty: 270 TABLET | Refills: 3 | Status: SHIPPED | OUTPATIENT
Start: 2025-03-24 | End: 2026-03-24

## 2025-03-24 RX ORDER — LOSARTAN POTASSIUM 100 MG/1
100 TABLET ORAL DAILY
Qty: 90 TABLET | Refills: 3 | Status: SHIPPED | OUTPATIENT
Start: 2025-03-24 | End: 2026-03-24

## 2025-03-24 RX ORDER — AMOXICILLIN 875 MG/1
875 TABLET, FILM COATED ORAL 2 TIMES DAILY
Qty: 20 TABLET | Refills: 0 | Status: SHIPPED | OUTPATIENT
Start: 2025-03-24 | End: 2025-04-03

## 2025-04-09 ASSESSMENT — ENCOUNTER SYMPTOMS
SWEATS: 0
TREMORS: 0
SEIZURES: 0
WEIGHT LOSS: 1
HUNGER: 0
SPEECH DIFFICULTY: 0
BLURRED VISION: 0
POLYPHAGIA: 0
NERVOUS/ANXIOUS: 0
FATIGUE: 0
POLYDIPSIA: 0
HEADACHES: 0
CONFUSION: 0
DIZZINESS: 0
BLACKOUTS: 0
VISUAL CHANGE: 0
WEAKNESS: 0

## 2025-04-11 LAB
ALBUMIN SERPL-MCNC: 4.1 G/DL (ref 3.6–5.1)
ALP SERPL-CCNC: 50 U/L (ref 35–144)
ALT SERPL-CCNC: 16 U/L (ref 9–46)
ANION GAP SERPL CALCULATED.4IONS-SCNC: 8 MMOL/L (CALC) (ref 7–17)
AST SERPL-CCNC: 19 U/L (ref 10–35)
BASOPHILS # BLD AUTO: 37 CELLS/UL (ref 0–200)
BASOPHILS NFR BLD AUTO: 0.5 %
BILIRUB SERPL-MCNC: 0.4 MG/DL (ref 0.2–1.2)
BUN SERPL-MCNC: 13 MG/DL (ref 7–25)
CALCIUM SERPL-MCNC: 9.2 MG/DL (ref 8.6–10.3)
CHLORIDE SERPL-SCNC: 104 MMOL/L (ref 98–110)
CO2 SERPL-SCNC: 28 MMOL/L (ref 20–32)
CREAT SERPL-MCNC: 1.09 MG/DL (ref 0.7–1.35)
EGFRCR SERPLBLD CKD-EPI 2021: 74 ML/MIN/1.73M2
EOSINOPHIL # BLD AUTO: 170 CELLS/UL (ref 15–500)
EOSINOPHIL NFR BLD AUTO: 2.3 %
ERYTHROCYTE [DISTWIDTH] IN BLOOD BY AUTOMATED COUNT: 13 % (ref 11–15)
EST. AVERAGE GLUCOSE BLD GHB EST-MCNC: 143 MG/DL
EST. AVERAGE GLUCOSE BLD GHB EST-SCNC: 7.9 MMOL/L
GLUCOSE SERPL-MCNC: 84 MG/DL (ref 65–99)
HBA1C MFR BLD: 6.6 % OF TOTAL HGB
HCT VFR BLD AUTO: 46.5 % (ref 38.5–50)
HGB BLD-MCNC: 15.1 G/DL (ref 13.2–17.1)
LDLC SERPL DIRECT ASSAY-MCNC: 68 MG/DL
LYMPHOCYTES # BLD AUTO: 1280 CELLS/UL (ref 850–3900)
LYMPHOCYTES NFR BLD AUTO: 17.3 %
MCH RBC QN AUTO: 29.9 PG (ref 27–33)
MCHC RBC AUTO-ENTMCNC: 32.5 G/DL (ref 32–36)
MCV RBC AUTO: 92.1 FL (ref 80–100)
MONOCYTES # BLD AUTO: 614 CELLS/UL (ref 200–950)
MONOCYTES NFR BLD AUTO: 8.3 %
NEUTROPHILS # BLD AUTO: 5298 CELLS/UL (ref 1500–7800)
NEUTROPHILS NFR BLD AUTO: 71.6 %
PLATELET # BLD AUTO: 222 THOUSAND/UL (ref 140–400)
PMV BLD REES-ECKER: 10.1 FL (ref 7.5–12.5)
POTASSIUM SERPL-SCNC: 4.1 MMOL/L (ref 3.5–5.3)
PROT SERPL-MCNC: 6.9 G/DL (ref 6.1–8.1)
RBC # BLD AUTO: 5.05 MILLION/UL (ref 4.2–5.8)
SODIUM SERPL-SCNC: 140 MMOL/L (ref 135–146)
VIT B12 SERPL-MCNC: 339 PG/ML (ref 200–1100)
WBC # BLD AUTO: 7.4 THOUSAND/UL (ref 3.8–10.8)

## 2025-04-14 DIAGNOSIS — E11.9 TYPE 2 DIABETES MELLITUS WITHOUT COMPLICATION, WITH LONG-TERM CURRENT USE OF INSULIN: ICD-10-CM

## 2025-04-14 DIAGNOSIS — Z79.4 TYPE 2 DIABETES MELLITUS WITHOUT COMPLICATION, WITH LONG-TERM CURRENT USE OF INSULIN: ICD-10-CM

## 2025-04-14 DIAGNOSIS — E66.01 OBESITY, MORBID, BMI 40.0-49.9 (MULTI): ICD-10-CM

## 2025-04-14 PROCEDURE — RXMED WILLOW AMBULATORY MEDICATION CHARGE

## 2025-04-14 RX ORDER — SEMAGLUTIDE 1.34 MG/ML
1 INJECTION, SOLUTION SUBCUTANEOUS WEEKLY
Qty: 3 ML | Refills: 2 | Status: SHIPPED | OUTPATIENT
Start: 2025-04-14 | End: 2025-07-09

## 2025-04-16 ENCOUNTER — APPOINTMENT (OUTPATIENT)
Age: 70
End: 2025-04-16
Payer: MEDICARE

## 2025-04-16 ENCOUNTER — PHARMACY VISIT (OUTPATIENT)
Dept: PHARMACY | Facility: CLINIC | Age: 70
End: 2025-04-16
Payer: COMMERCIAL

## 2025-04-16 VITALS
HEIGHT: 63 IN | HEART RATE: 75 BPM | DIASTOLIC BLOOD PRESSURE: 70 MMHG | SYSTOLIC BLOOD PRESSURE: 120 MMHG | BODY MASS INDEX: 43.94 KG/M2 | OXYGEN SATURATION: 97 % | WEIGHT: 248 LBS

## 2025-04-16 DIAGNOSIS — I10 BENIGN ESSENTIAL HYPERTENSION: ICD-10-CM

## 2025-04-16 DIAGNOSIS — Z79.4 TYPE 2 DIABETES MELLITUS WITHOUT COMPLICATION, WITH LONG-TERM CURRENT USE OF INSULIN: Primary | ICD-10-CM

## 2025-04-16 DIAGNOSIS — E78.5 DYSLIPIDEMIA: ICD-10-CM

## 2025-04-16 DIAGNOSIS — E53.8 B12 DEFICIENCY: ICD-10-CM

## 2025-04-16 DIAGNOSIS — M17.11 ARTHRITIS OF RIGHT KNEE: ICD-10-CM

## 2025-04-16 DIAGNOSIS — E66.01 OBESITY, MORBID, BMI 40.0-49.9 (MULTI): ICD-10-CM

## 2025-04-16 DIAGNOSIS — E11.9 TYPE 2 DIABETES MELLITUS WITHOUT COMPLICATION, WITH LONG-TERM CURRENT USE OF INSULIN: Primary | ICD-10-CM

## 2025-04-16 DIAGNOSIS — G47.33 OBSTRUCTIVE SLEEP APNEA, ADULT: ICD-10-CM

## 2025-04-16 DIAGNOSIS — D64.9 ANEMIA, UNSPECIFIED TYPE: ICD-10-CM

## 2025-04-16 DIAGNOSIS — N18.31 STAGE 3A CHRONIC KIDNEY DISEASE (MULTI): ICD-10-CM

## 2025-04-16 DIAGNOSIS — K21.9 GASTROESOPHAGEAL REFLUX DISEASE WITHOUT ESOPHAGITIS: ICD-10-CM

## 2025-04-16 PROCEDURE — 1160F RVW MEDS BY RX/DR IN RCRD: CPT | Performed by: FAMILY MEDICINE

## 2025-04-16 PROCEDURE — 99214 OFFICE O/P EST MOD 30 MIN: CPT | Performed by: FAMILY MEDICINE

## 2025-04-16 PROCEDURE — 3078F DIAST BP <80 MM HG: CPT | Performed by: FAMILY MEDICINE

## 2025-04-16 PROCEDURE — 3074F SYST BP LT 130 MM HG: CPT | Performed by: FAMILY MEDICINE

## 2025-04-16 PROCEDURE — 3044F HG A1C LEVEL LT 7.0%: CPT | Performed by: FAMILY MEDICINE

## 2025-04-16 PROCEDURE — 3048F LDL-C <100 MG/DL: CPT | Performed by: FAMILY MEDICINE

## 2025-04-16 PROCEDURE — 4010F ACE/ARB THERAPY RXD/TAKEN: CPT | Performed by: FAMILY MEDICINE

## 2025-04-16 PROCEDURE — G2211 COMPLEX E/M VISIT ADD ON: HCPCS | Performed by: FAMILY MEDICINE

## 2025-04-16 PROCEDURE — 1036F TOBACCO NON-USER: CPT | Performed by: FAMILY MEDICINE

## 2025-04-16 PROCEDURE — 1159F MED LIST DOCD IN RCRD: CPT | Performed by: FAMILY MEDICINE

## 2025-04-16 PROCEDURE — 3008F BODY MASS INDEX DOCD: CPT | Performed by: FAMILY MEDICINE

## 2025-04-16 ASSESSMENT — ENCOUNTER SYMPTOMS
BLURRED VISION: 0
POLYPHAGIA: 0
DIZZINESS: 0
BLACKOUTS: 0
NAUSEA: 0
VISUAL CHANGE: 0
TREMORS: 0
FATIGUE: 0
SEIZURES: 0
HEADACHES: 0
VOMITING: 0
CONFUSION: 0
POLYDIPSIA: 0
HUNGER: 0
SPEECH DIFFICULTY: 0
NERVOUS/ANXIOUS: 0
CHEST TIGHTNESS: 0
APPETITE CHANGE: 0
PALPITATIONS: 0
WEAKNESS: 0
DIARRHEA: 0
CONSTIPATION: 0
ABDOMINAL PAIN: 0
COUGH: 0
WEIGHT LOSS: 1
SWEATS: 0
ACTIVITY CHANGE: 0
SHORTNESS OF BREATH: 0

## 2025-04-16 NOTE — ASSESSMENT & PLAN NOTE
GFR has now normalized with number above 60, blood pressure under good control, continue to follow microalbumin

## 2025-04-16 NOTE — ASSESSMENT & PLAN NOTE
Blood pressure now under good control, recent renal function electrolytes are also both normal.  GFR above 60

## 2025-04-16 NOTE — ASSESSMENT & PLAN NOTE
A1c testing now at 6.6, is having some trouble with low sugars in the middle the night and early morning, patient has been using his NovoLog before sleep without eating.  He is only using about twice a day once with a meal and once before sleeping.  A1c testing is now down to 6.6, patient instructed to discontinue NovoLog, continue with basal insulin at 40 units twice a day along with other diabetic medicine, call the office if blood sugars over 200.  Recheck again in 3 months with A1c testing.  Patient has continuous glucose meter that he uses frequently, encouraged to check blood sugar as needed especially if he feels as though his sugars are dropping.  Call the office if elevated

## 2025-04-16 NOTE — PROGRESS NOTES
Subjective   Patient ID: NITA Bustos is a 69 y.o. male who presents for 3 MO CK.    Diabetes  He has type 2 diabetes mellitus. No MedicAlert identification noted. The initial diagnosis of diabetes was made 10 years ago. Pertinent negatives for hypoglycemia include no confusion, dizziness, headaches, hunger, mood changes, nervousness/anxiousness, pallor, seizures, sleepiness, speech difficulty, sweats or tremors. Associated symptoms include weight loss. Pertinent negatives for diabetes include no blurred vision, no chest pain, no fatigue, no foot paresthesias, no foot ulcerations, no polydipsia, no polyphagia, no polyuria, no visual change and no weakness. Pertinent negatives for hypoglycemia complications include no blackouts, no hospitalization, no nocturnal hypoglycemia, no required assistance and no required glucagon injection. Symptoms are improving. Diabetic complications include nephropathy and retinopathy. Pertinent negatives for diabetic complications include no CVA, heart disease, impotence, peripheral neuropathy or PVD. Risk factors for coronary artery disease include dyslipidemia, hypertension, obesity, sedentary lifestyle and stress. Current diabetic treatment includes diet, insulin injections and oral agent (triple therapy). He is compliant with treatment most of the time. He is currently taking insulin pre-breakfast, pre-lunch and pre-dinner. Insulin injections are given by patient. Rotation sites for injection include the abdominal wall. His weight is fluctuating minimally. He is following a generally unhealthy diet. When asked about meal planning, he reported none. He has not had a previous visit with a dietitian. He rarely participates in exercise. He monitors blood glucose at home 3-4 x per day. He monitors urine at home <1 x per month. Blood glucose monitoring compliance is good. His home blood glucose trend is decreasing steadily. His breakfast blood glucose is taken between 9-10 am. His  breakfast blood glucose range is generally  mg/dl. His lunch blood glucose is taken between 2-3 pm. His lunch blood glucose range is generally 130-140 mg/dl. His dinner blood glucose is taken between 6-7 pm. His dinner blood glucose range is generally 130-140 mg/dl. His bedtime blood glucose is taken after 11 pm. His bedtime blood glucose range is generally 140-180 mg/dl. His overall blood glucose range is 110-130 mg/dl. He does not see a podiatrist.Eye exam is current.      No headache, chest pain, shortness of breath, dizziness, lightheadedness, or edema  No low blood sugars since last OV, seen opthalmology in the past year, and no numbness or tingling in feet, skin normal.  Follows with cardiology and nephrology  Clinical pharmacy involved  No more heartburn since starting PPI, Taking PPI daily without breakthrough symptoms.  Reviewed dietary, caffeine, tobacco, alcohol, and NSAID use.  No dyspepsia, dysphagia, reflux, melena, or abdominal pain.  Having lots of LBS, up to 170 at night only using Novolog a couple times of day, not using with meals  Some SEVERINO at times  Joint aches and pains, some better in the past 3 days, had been bothering for 2 weeks  Patient has been using their CPAP nightly and is experiencing restful sleep, no morning headaches, no witnessed snoring, and notices a difference if they do not use the device.  No more vertigo or ear issues    Review of Systems   Constitutional:  Positive for weight loss. Negative for activity change, appetite change and fatigue.   Eyes:  Negative for blurred vision.   Respiratory:  Negative for cough, chest tightness and shortness of breath.    Cardiovascular:  Negative for chest pain, palpitations and leg swelling.   Gastrointestinal:  Negative for abdominal pain, constipation, diarrhea, nausea and vomiting.   Endocrine: Negative for polydipsia, polyphagia and polyuria.   Genitourinary:  Negative for impotence.   Skin:  Negative for pallor.   Neurological:   "Negative for dizziness, tremors, seizures, speech difficulty, weakness and headaches.   Psychiatric/Behavioral:  Negative for confusion. The patient is not nervous/anxious.        Objective   /70   Pulse 75   Ht 1.6 m (5' 3\")   Wt 112 kg (248 lb)   SpO2 97%   BMI 43.93 kg/m²     Physical Exam  Vitals and nursing note reviewed.   Constitutional:       Appearance: Normal appearance.   HENT:      Head: Normocephalic and atraumatic.      Right Ear: Tympanic membrane, ear canal and external ear normal.      Left Ear: Tympanic membrane, ear canal and external ear normal.      Nose: Nose normal.      Mouth/Throat:      Mouth: Mucous membranes are moist.      Pharynx: Oropharynx is clear.   Cardiovascular:      Rate and Rhythm: Normal rate and regular rhythm.      Pulses: Normal pulses.      Heart sounds: Normal heart sounds.   Pulmonary:      Effort: Pulmonary effort is normal.      Breath sounds: Normal breath sounds.   Musculoskeletal:      Cervical back: Normal range of motion and neck supple.   Skin:     General: Skin is warm and dry.      Capillary Refill: Capillary refill takes less than 2 seconds.   Neurological:      Mental Status: He is alert.   Psychiatric:         Mood and Affect: Mood normal.         Behavior: Behavior normal.         Assessment/Plan   Problem List Items Addressed This Visit           ICD-10-CM    Benign essential hypertension I10    Blood pressure now under good control, recent renal function electrolytes are also both normal.  GFR above 60         Relevant Orders    Follow Up In Primary Care - Established    Comprehensive Metabolic Panel    Type 2 diabetes mellitus without complications - Primary E11.9    A1c testing now at 6.6, is having some trouble with low sugars in the middle the night and early morning, patient has been using his NovoLog before sleep without eating.  He is only using about twice a day once with a meal and once before sleeping.  A1c testing is now down to 6.6, " patient instructed to discontinue NovoLog, continue with basal insulin at 40 units twice a day along with other diabetic medicine, call the office if blood sugars over 200.  Recheck again in 3 months with A1c testing.  Patient has continuous glucose meter that he uses frequently, encouraged to check blood sugar as needed especially if he feels as though his sugars are dropping.  Call the office if elevated         Relevant Orders    Follow Up In Primary Care - Established    Comprehensive Metabolic Panel    Hemoglobin A1C    Dyslipidemia E78.5    Tolerating Zetia and rosuvastatin, LDL cholesterol below 75.         Relevant Orders    Follow Up In Primary Care - Established    Comprehensive Metabolic Panel    Obesity, morbid, BMI 40.0-49.9 (Multi) E66.01    Gradually losing weight, tolerating GLP-1 therapy         Relevant Orders    Follow Up In Primary Care - Established    Obstructive sleep apnea, adult G47.33    Patient has been using their CPAP nightly and is experiencing restful sleep, no morning headaches, no witnessed snoring, and notices a difference if they do not use the device.           Relevant Orders    Follow Up In Primary Care - Established    Stage 3a chronic kidney disease (Multi) N18.31    GFR has now normalized with number above 60, blood pressure under good control, continue to follow microalbumin         Relevant Orders    Follow Up In Primary Care - Established    CBC and Auto Differential    Comprehensive Metabolic Panel    Arthritis of right knee M17.11    Relevant Orders    Follow Up In Primary Care - Established    Gastroesophageal reflux disease without esophagitis K21.9    Markedly improved symptoms with PPI.         Relevant Orders    Follow Up In Primary Care - Established     Other Visit Diagnoses         Codes      B12 deficiency     E53.8    Relevant Orders    Follow Up In Primary Care - Established      Anemia, unspecified type     D64.9    Relevant Orders    Follow Up In Primary  Care - Established    CBC and Auto Differential

## 2025-04-19 DIAGNOSIS — I10 HYPERTENSION, UNSPECIFIED TYPE: ICD-10-CM

## 2025-04-21 RX ORDER — METOPROLOL SUCCINATE 100 MG/1
TABLET, EXTENDED RELEASE ORAL
Qty: 30 TABLET | Refills: 0 | Status: SHIPPED | OUTPATIENT
Start: 2025-04-21

## 2025-04-28 ENCOUNTER — APPOINTMENT (OUTPATIENT)
Dept: CARDIOLOGY | Facility: CLINIC | Age: 70
End: 2025-04-28
Payer: MEDICARE

## 2025-04-28 VITALS
SYSTOLIC BLOOD PRESSURE: 142 MMHG | BODY MASS INDEX: 42.88 KG/M2 | HEIGHT: 63 IN | OXYGEN SATURATION: 98 % | DIASTOLIC BLOOD PRESSURE: 82 MMHG | HEART RATE: 73 BPM | WEIGHT: 242 LBS

## 2025-04-28 DIAGNOSIS — E11.22 TYPE 2 DIABETES MELLITUS WITH STAGE 3A CHRONIC KIDNEY DISEASE, WITHOUT LONG-TERM CURRENT USE OF INSULIN (MULTI): ICD-10-CM

## 2025-04-28 DIAGNOSIS — I1A.0 RESISTANT HYPERTENSION: ICD-10-CM

## 2025-04-28 DIAGNOSIS — R55 SYNCOPE, UNSPECIFIED SYNCOPE TYPE: ICD-10-CM

## 2025-04-28 DIAGNOSIS — R00.0 TACHYCARDIA: ICD-10-CM

## 2025-04-28 DIAGNOSIS — N18.31 TYPE 2 DIABETES MELLITUS WITH STAGE 3A CHRONIC KIDNEY DISEASE, WITHOUT LONG-TERM CURRENT USE OF INSULIN (MULTI): ICD-10-CM

## 2025-04-28 DIAGNOSIS — I10 HYPERTENSION, UNSPECIFIED TYPE: Primary | ICD-10-CM

## 2025-04-28 PROCEDURE — 3079F DIAST BP 80-89 MM HG: CPT | Performed by: STUDENT IN AN ORGANIZED HEALTH CARE EDUCATION/TRAINING PROGRAM

## 2025-04-28 PROCEDURE — 1159F MED LIST DOCD IN RCRD: CPT | Performed by: STUDENT IN AN ORGANIZED HEALTH CARE EDUCATION/TRAINING PROGRAM

## 2025-04-28 PROCEDURE — 3048F LDL-C <100 MG/DL: CPT | Performed by: STUDENT IN AN ORGANIZED HEALTH CARE EDUCATION/TRAINING PROGRAM

## 2025-04-28 PROCEDURE — 99214 OFFICE O/P EST MOD 30 MIN: CPT | Performed by: STUDENT IN AN ORGANIZED HEALTH CARE EDUCATION/TRAINING PROGRAM

## 2025-04-28 PROCEDURE — 3044F HG A1C LEVEL LT 7.0%: CPT | Performed by: STUDENT IN AN ORGANIZED HEALTH CARE EDUCATION/TRAINING PROGRAM

## 2025-04-28 PROCEDURE — 3077F SYST BP >= 140 MM HG: CPT | Performed by: STUDENT IN AN ORGANIZED HEALTH CARE EDUCATION/TRAINING PROGRAM

## 2025-04-28 PROCEDURE — 1160F RVW MEDS BY RX/DR IN RCRD: CPT | Performed by: STUDENT IN AN ORGANIZED HEALTH CARE EDUCATION/TRAINING PROGRAM

## 2025-04-28 PROCEDURE — 3008F BODY MASS INDEX DOCD: CPT | Performed by: STUDENT IN AN ORGANIZED HEALTH CARE EDUCATION/TRAINING PROGRAM

## 2025-04-28 PROCEDURE — 4010F ACE/ARB THERAPY RXD/TAKEN: CPT | Performed by: STUDENT IN AN ORGANIZED HEALTH CARE EDUCATION/TRAINING PROGRAM

## 2025-04-28 PROCEDURE — 1036F TOBACCO NON-USER: CPT | Performed by: STUDENT IN AN ORGANIZED HEALTH CARE EDUCATION/TRAINING PROGRAM

## 2025-04-28 RX ORDER — DILTIAZEM HYDROCHLORIDE 240 MG/1
240 CAPSULE, COATED, EXTENDED RELEASE ORAL DAILY
Qty: 90 CAPSULE | Refills: 3 | Status: SHIPPED | OUTPATIENT
Start: 2025-04-28 | End: 2026-04-28

## 2025-04-28 NOTE — PROGRESS NOTES
Chief Complaint   Patient presents with    Follow-up     6 mo follow up, refill meds     HPI:  I  was requested by Dr. Rodríguez to evaluate this patient in consultation for cardiac assessment.     Mr. Av Bustos is a 69-year-old male with history of severe obesity, sleep apnea on CPAP, hypertension, diabetes mellitus type 2, possible obesity hypoventilation syndrome,  hyperlipidemia presented with syncope, sinus bradycardia, NERY, hyperkalemia, hyperglycemia, metabolic acidosis, hypomagnesemia. He states that he is still tachycardic (90bpm) even with top dose Metoprolol. He denies chest pain, shortness of breath, palpitations, leg edema, lightheadedness, headaches, fever, chills, orthopnea, paroxysmal nocturnal dyspnea or syncope.  EKG shows normal sinus rhythm with no signs of ACS.  The echocardiogram showed normal LVEF 60% with no wall motion abnormalities.  72h monitor (09/2023) showing baseline sinus rhythm, average HR 93bpm, <1% extra-beats.     Previously, patient was feeling fine, still complained of elevated blood pressure. Notably, he reports that his BP has always been very high from his 20s, and sometimes associated with headache. He is compliant with the medication.     Previously, he was feeling well, but very concerned about his constantly elevated blood pressure measurements. Notably, he has White Coat Hypertension as well. Still complaining of elevated blood pressure. Notably, he reports that his BP has always been very high from his 20s, and sometimes associated with headache. He is compliant with the medication. Notably, he has been under a lot of stress, once he is taking care of his wife with cirrhosis. He brought today his home BP measurements, always elevated.      Previously, he had a more controlled BP. He brought a BP dairy with BP ~130-140s. He was feeling a little bit more tired that usual. He was suggested to avoid sedentary and be more active.     Previously, he stated that he was  "asymptomatic from the cardiovascular standpoint and that he was feeling fine. He had been compliant to the medication. He was concerned about his low heart rate ~37-24bpm overnight. Asymptomatic. He was taking Metoprolol tartrate 100mg BID, has been switched to Metoprolol succinate 100mg daily.     Patient returns today stating that his blood pressure and heart rate are more stable than before. He is compliant with the medication. No further issues. He endorses that he gets nervous going to the dentist, last time his BP was 230/120mmHg, but at home went down to 110/70mmHg.    Past Medical History  Medical History[1]    Past Surgical History  Surgical History[2]    Past Family History  Family History[3]    Allergy History  Allergies[4]    Past Social History  Social History[5]    Tobacco Use History[6]    Objective Data:  Last Recorded Vitals:  Vitals:    04/28/25 1319   Weight: 110 kg (242 lb)   Height: 1.6 m (5' 3\")       Last Labs:  CBC - 4/10/2025: 10:00 AM  7.4 15.1 222    46.5      CMP - 4/10/2025: 10:00 AM  9.2 6.9 19 --- 0.4   _ 4.1 16 50      PTT - No results in last year.  _   _ _     TROPHS   Date/Time Value Ref Range Status   08/21/2023 09:48 AM 4 0 - 20 ng/L Final     Comment:     .  Less than 99th percentile of normal range cutoff-  Female and children under 18 years old <14 ng/L; Male <21 ng/L: Negative  Repeat testing should be performed if clinically indicated.   .  Female and children under 18 years old 14-50 ng/L; Male 21-50 ng/L:  Consistent with possible cardiac damage and possible increased clinical   risk. Serial measurements may help to assess extent of myocardial damage.   .  >50 ng/L: Consistent with cardiac damage, increased clinical risk and  myocardial infarction. Serial measurements may help assess extent of   myocardial damage.   .   NOTE: Children less than 1 year old may have higher baseline troponin   levels and results should be interpreted in conjunction with the overall   clinical " context.   .  NOTE: Troponin I testing is performed using a different   testing methodology at [facility] than at other   system hospitals. Direct result comparisons should only   be made within the same method.     08/21/2023 08:29 AM 5 0 - 20 ng/L Final     Comment:     .  Less than 99th percentile of normal range cutoff-  Female and children under 18 years old <14 ng/L; Male <21 ng/L: Negative  Repeat testing should be performed if clinically indicated.   .  Female and children under 18 years old 14-50 ng/L; Male 21-50 ng/L:  Consistent with possible cardiac damage and possible increased clinical   risk. Serial measurements may help to assess extent of myocardial damage.   .  >50 ng/L: Consistent with cardiac damage, increased clinical risk and  myocardial infarction. Serial measurements may help assess extent of   myocardial damage.   .   NOTE: Children less than 1 year old may have higher baseline troponin   levels and results should be interpreted in conjunction with the overall   clinical context.   .  NOTE: Troponin I testing is performed using a different   testing methodology at [facility] than at other   [facility] hospitals. Direct result comparisons should only   be made within the same method.       BNP   Date/Time Value Ref Range Status   08/21/2023 08:29 AM 54 0 - 99 pg/mL Final     Comment:     .  <100 pg/mL - Heart failure unlikely  100-299 pg/mL - Intermediate probability of acute heart  .               failure exacerbation. Correlate with clinical  .               context and patient history.    >=300 pg/mL - Heart Failure likely. Correlate with clinical  .               context and patient history.  BNP testing is performed using different testing   methodology at [facility] than at other   [facility] hospitals. Direct result comparisons should   only be made within the same method.       HGBA1C   Date/Time Value Ref Range Status   04/10/2025 10:00 AM 6.6 <5.7 %  of total Hgb Final     Comment:     For someone without known diabetes, a hemoglobin A1c  value of 6.5% or greater indicates that they may have   diabetes and this should be confirmed with a follow-up   test.     For someone with known diabetes, a value <7% indicates   that their diabetes is well controlled and a value   greater than or equal to 7% indicates suboptimal   control. A1c targets should be individualized based on   duration of diabetes, age, comorbid conditions, and   other considerations.     Currently, no consensus exists regarding use of  hemoglobin A1c for diagnosis of diabetes for children.         01/16/2025 02:42 PM 6.7 See comment % Final   10/14/2024 08:57 AM 8.9 See comment % Final     LDLCALC   Date/Time Value Ref Range Status   01/16/2025 02:42 PM 33 <=99 mg/dL Final     Comment:                                 Near   Borderline      AGE      Desirable  Optimal    High     High     Very High     0-19 Y     0 - 109     ---    110-129   >/= 130     ----    20-24 Y     0 - 119     ---    120-159   >/= 160     ----      >24 Y     0 -  99   100-129  130-159   160-189     >/=190     03/19/2024 06:04 AM 43 <=99 mg/dL Final     Comment:                                 Near   Borderline      AGE      Desirable  Optimal    High     High     Very High     0-19 Y     0 - 109     ---    110-129   >/= 130     ----    20-24 Y     0 - 119     ---    120-159   >/= 160     ----      >24 Y     0 -  99   100-129  130-159   160-189     >/=190       VLDL   Date/Time Value Ref Range Status   01/16/2025 02:42 PM 36 0 - 40 mg/dL Final   03/19/2024 06:04 AM 28 0 - 40 mg/dL Final   11/11/2022 09:38 AM 52 0 - 40 mg/dL Final   09/22/2020 10:24 AM 44 0 - 40 mg/dL Final   06/26/2018 12:00 AM 33 0 - 40 mg/dL Final        Patient Medications:  Encounter Medications[7]    Physical Exam:  General: alert, oriented and in no acute distress  HEENT: NC/AT; EOMI; PERRLA, external ear is normal  Neck: supple; trachea midline; no  masses; no JVD  Chest: clear breath sounds bilaterally; no wheezing  Cardio: regular rhythm, S1S2 normal, no murmurs  Abdomen: Soft, non-tender, non-distension, no organomegaly  Extremities: no clubbing/cyanosis/edema  Neuro: Grossly intact     Psychiatric: Normal mood and affect     Past Cardiology Results (Last 3 Years):  EKG:  ECG 12 lead (Clinic Performed) 10/29/2024    Echo:  Echo Results:  No results found for this or any previous visit from the past 365 days.     Cath:  No results found for this or any previous visit from the past 1095 days.    CV NCDR CATHPCI V5 COLLECTION FORM   Stress Test:  No results found for this or any previous visit from the past 1095 days.    Cardiac Imaging:  No results found for this or any previous visit from the past 1095 days.       Assessment/Plan     ..In summary, Mr. Av Bustos is a 69-year-old male with history of severe obesity, sleep apnea on CPAP, hypertension, diabetes mellitus type 2, possible obesity hypoventilation syndrome,  hyperlipidemia presented with syncope, sinus bradycardia, NERY, hyperkalemia, hyperglycemia, metabolic acidosis, hypomagnesemia. He states that he is still tachycardic (90bpm) even with top dose Metoprolol. He denies chest pain, shortness of breath, palpitations, leg edema, lightheadedness, headaches, fever, chills, orthopnea, paroxysmal nocturnal dyspnea or syncope.  EKG shows normal sinus rhythm with no signs of ACS.  The echocardiogram showed normal LVEF 60% with no wall motion abnormalities.  72h monitor (09/2023) showing baseline sinus rhythm, average HR 93bpm, <1% extra-beats.     Assessment     # Hypertension / White Coat Hypertension  - Patient returns today feeling well, but very concerned about his contantly elevated blood pressure measurements. Notably, he has White Coat Hypertension as well. Still complaining of elevated blood pressure. Notably, he reports that his BP has always been very high from his 20s, and sometimes associated  with headache. He is compliant with the medication. Notably, he has been under a lot of stress, once he is taking care of his wife with cirrhosis. He brought today his home BP measurements, always elevated.   - Elevated BP in both arms, <10mmHg difference between them.  - He was suggested to avoid sedentary and be more active.  - Lab tests were OK. K 3.8, Cre 1.05.  - Patient returned for two extra appointment stating that he was asymptomatic from the cardiovascular standpoint and that he was feeling fine. He has been compliant to the medication. His HR has been better controlled with Metoprolol succinate instead of tartrate, however it is still low overnight ~40bpm.  - -Keep Cardizem CR 240mg daily  - Keep Losartan 100mg daily.  - Keep Clonidine to 0.2mg q8h.  - Keep Metoprolol succinate 100mg daily.  - Keep hydrochlorothiazide 25mg daily.  - Would avoid the reinitiation of spironolactone, diltiazem, and nadolol all given issues with hyperkalemia and bradycardia in the past. Patient also had cough with Lisinopril.    - Follow up yearly.      # Sinus tachycardia  - Echo showing LVEF 60%  - Resting heart rate of 110 bpm; patient remains asymptomatic, today improvement in HR 85bpm  - Would suggest to keep Metoprolol tartrate to 100mg BID  - Follow up in Cardiology clinic with Dr. George.  - EKG shows normal sinus tachycardia rhythm with no signs of ACS.  - The echocardiogram showed normal LVEF 60% with no wall motion abnormalities.  - 72h monitor (09/2023) showing baseline sinus rhythm, average HR 93bpm, <1% extra-beats.  - Keep Metoprolol tartrate 100mg q12h, Diltiazem 240mg daily.     # T2DM  - Keep insulin Lispro, Glipizide  - ISS  - Patient had episode of hypoglycemia after 80u insulin; would suggest to adjust it accordingly.     # Hyperlipidemia  - Keep atorvastatin 40mg daily, Ezetimibe 10mg daily     # BATSHEVA on CPAP  - Keep CPAP     We have discussed the most common side effects of the prescribed medications,  indications, drug interactions, risks, complications, and alternatives of medications/therapeutics were explained and discussed. The patient has been requested to monitor closely for any untoward side effects or complications of medications. The patient has been strongly advised to be compliant with the recommendations, all the questions and concerns have been addressed. The patient has been also instructed to call, to return sooner or to go to the emergency department if symptoms persist or get worsen. The patient voiced understanding and denies any further questions at this time.      This note was transcribed using the Dragon Dictation system. There may be grammatical, punctuation, or verbiage errors that occur with voice recognition programs.     Counseling greater than 50% of visit regarding all cardiac issues.     Thank you, Dr. Rodríguez, for allowing me to participate in the care of this patient. Please do not hesitate to contact me with any further questions or concerns.     Nikunj Bettencourt MD  Cardiology        [1]   Past Medical History:  Diagnosis Date    Anxiety 30 YEARS AGO    Arthritis     Cataract     Chronic kidney disease 2023    Diabetes mellitus (Multi) 2010    Encounter for screening for malignant neoplasm of prostate     Prostate cancer screening    Essential (primary) hypertension 02/05/2018    HTN (hypertension), benign    Essential (primary) hypertension 05/07/2018    Benign essential hypertension    Essential (primary) hypertension 05/22/2018    Benign essential hypertension    Glaucoma     Headache     HL (hearing loss)     Personal history of other drug therapy 09/09/2019    History of influenza vaccination    Personal history of other endocrine, nutritional and metabolic disease 02/05/2018    History of type 2 diabetes mellitus    Personal history of other endocrine, nutritional and metabolic disease 05/07/2018    History of type 2 diabetes mellitus    Personal history of other  endocrine, nutritional and metabolic disease 05/22/2018    History of type 2 diabetes mellitus    Personal history of other endocrine, nutritional and metabolic disease 09/10/2018    History of type 2 diabetes mellitus    Personal history of other endocrine, nutritional and metabolic disease 12/03/2018    History of type 2 diabetes mellitus    Personal history of other endocrine, nutritional and metabolic disease 03/20/2020    History of type 2 diabetes mellitus    Personal history of other endocrine, nutritional and metabolic disease 09/09/2019    History of type 2 diabetes mellitus    Personal history of other endocrine, nutritional and metabolic disease 06/26/2018    History of type 2 diabetes mellitus    Personal history of other endocrine, nutritional and metabolic disease 06/03/2019    History of type 2 diabetes mellitus    Personal history of other endocrine, nutritional and metabolic disease 12/15/2020    History of type 2 diabetes mellitus    Visual impairment WEAR GLASSES FOR DISTANCE   [2]   Past Surgical History:  Procedure Laterality Date    COLONOSCOPY  08/10/2020    repeat 5 yrs    JOINT REPLACEMENT  TOTAL LEFT HIP  03/2021    OTHER SURGICAL HISTORY  08/16/2021    Hip replacement    WISDOM TOOTH EXTRACTION     [3]   Family History  Problem Relation Name Age of Onset    COPD Mother INSE QUIL     Dementia Mother INES QUIL     Alcohol abuse Mother INES QUIL     Depression Mother INES QUIL     Bone cancer Father JESS  QUIL     Alcohol abuse Father JESS  QUIL     Cancer Father JESS  QUIL     Stroke Father JESS  QUIL    [4] No Known Allergies  [5]   Social History  Socioeconomic History    Marital status:      Spouse name: Ann   Tobacco Use    Smoking status: Never     Passive exposure: Never    Smokeless tobacco: Never   Vaping Use    Vaping status: Never Used   Substance and Sexual Activity    Alcohol use: Not Currently    Drug use: Never    Sexual activity: Not Currently      Partners: Female     Birth control/protection: None   [6]   Social History  Tobacco Use   Smoking Status Never    Passive exposure: Never   Smokeless Tobacco Never   [7]   Outpatient Encounter Medications as of 4/28/2025   Medication Sig Dispense Refill    cholecalciferol, vitamin D3, (VITAMIN D3 ORAL) Take 1 tablet by mouth once daily.      cloNIDine (Catapres) 0.2 mg tablet Take 1 tablet (0.2 mg) by mouth every 8 hours. 270 tablet 3    dapagliflozin propanediol (Farxiga) 10 mg Take 1 tablet (10 mg) by mouth once daily. 90 tablet 1    dilTIAZem CD (Cardizem CD) 240 mg 24 hr capsule Take 1 capsule (240 mg) by mouth once daily. 90 capsule 3    ezetimibe (Zetia) 10 mg tablet Take 1 tablet (10 mg) by mouth once daily. 90 tablet 3    FLUoxetine (PROzac) 10 mg capsule Take 1 capsule (10 mg) by mouth early in the morning..      FreeStyle Maya 2 Sensor kit Inject 1 each under the skin every 14 (fourteen) days. 6 each 3    hydroCHLOROthiazide (HYDRODiuril) 25 mg tablet Take 1 tablet (25 mg) by mouth once daily. 30 tablet 11    insulin degludec (Tresiba FlexTouch U-100) 100 unit/mL (3 mL) injection Inject 40 Units under the skin 2 times a day. Take as directed per insulin instructions. 75 mL 1    LORazepam (Ativan) 0.5 mg tablet Take 1 tablet (0.5 mg) by mouth every 6 hours if needed for anxiety for up to 7 days. 28 tablet 0    losartan (Cozaar) 100 mg tablet Take 1 tablet (100 mg) by mouth once daily. 90 tablet 3    metFORMIN (Glucophage) 1,000 mg tablet Take 1 tablet (1,000 mg) by mouth 2 times daily (morning and late afternoon). 180 tablet 3    metoprolol succinate XL (Toprol-XL) 100 mg 24 hr tablet TAKE 1 TABLET (100mg) BY MOUTH ONCE DAILY. DO NOT CRUSH OR CHEW 30 tablet 0    multivit-min-folic-vit K-lycop 400- mcg tablet       omeprazole (PriLOSEC) 20 mg DR capsule Take 1 capsule (20 mg) by mouth once daily. Do not crush or chew. 30 capsule 11    OneTouch Delica Plus Lancet 33 gauge misc CHECK BLOOD SUGARS 3  TIMES A DAY      OneTouch Ultra Test strip CHECK BLOOD SUGAR THREE TIMES DAILY      pioglitazone (Actos) 15 mg tablet Take 1 tablet (15 mg) by mouth once daily. 30 tablet 11    rosuvastatin (Crestor) 5 mg tablet once daily at bedtime.      semaglutide (Ozempic) 1 mg/dose (4 mg/3 mL) pen injector Inject 1 mg under the skin 1 (one) time per week. 3 mL 2    Slow-Mag 71.5 mg tablet,delayed release (DR/EC) Take 1 tablet (71.5 mg) by mouth early in the morning..       No facility-administered encounter medications on file as of 4/28/2025.

## 2025-04-29 PROCEDURE — RXMED WILLOW AMBULATORY MEDICATION CHARGE

## 2025-05-01 ENCOUNTER — PHARMACY VISIT (OUTPATIENT)
Dept: PHARMACY | Facility: CLINIC | Age: 70
End: 2025-05-01
Payer: COMMERCIAL

## 2025-05-18 PROCEDURE — RXMED WILLOW AMBULATORY MEDICATION CHARGE

## 2025-05-21 ENCOUNTER — PHARMACY VISIT (OUTPATIENT)
Dept: PHARMACY | Facility: CLINIC | Age: 70
End: 2025-05-21
Payer: COMMERCIAL

## 2025-06-11 ENCOUNTER — APPOINTMENT (OUTPATIENT)
Dept: NEPHROLOGY | Facility: CLINIC | Age: 70
End: 2025-06-11
Payer: MEDICARE

## 2025-06-11 LAB
ALBUMIN SERPL-MCNC: 4.1 G/DL (ref 3.6–5.1)
ALBUMIN/CREAT UR: 126 MG/G CREAT
ALP SERPL-CCNC: 50 U/L (ref 35–144)
ALT SERPL-CCNC: 13 U/L (ref 9–46)
ANION GAP SERPL CALCULATED.4IONS-SCNC: 10 MMOL/L (CALC) (ref 7–17)
APPEARANCE UR: CLEAR
AST SERPL-CCNC: 18 U/L (ref 10–35)
BACTERIA #/AREA URNS HPF: ABNORMAL /HPF
BILIRUB SERPL-MCNC: 0.6 MG/DL (ref 0.2–1.2)
BILIRUB UR QL STRIP: NEGATIVE
BUN SERPL-MCNC: 15 MG/DL (ref 7–25)
CALCIUM SERPL-MCNC: 9.2 MG/DL (ref 8.6–10.3)
CHLORIDE SERPL-SCNC: 103 MMOL/L (ref 98–110)
CO2 SERPL-SCNC: 25 MMOL/L (ref 20–32)
COLOR UR: YELLOW
CREAT SERPL-MCNC: 1.09 MG/DL (ref 0.7–1.35)
CREAT UR-MCNC: 73 MG/DL (ref 20–320)
EGFRCR SERPLBLD CKD-EPI 2021: 74 ML/MIN/1.73M2
GLUCOSE SERPL-MCNC: 133 MG/DL (ref 65–99)
GLUCOSE UR QL STRIP: ABNORMAL
HGB UR QL STRIP: NEGATIVE
HYALINE CASTS #/AREA URNS LPF: ABNORMAL /LPF
KETONES UR QL STRIP: NEGATIVE
LEUKOCYTE ESTERASE UR QL STRIP: NEGATIVE
MICROALBUMIN UR-MCNC: 9.2 MG/DL
NITRITE UR QL STRIP: NEGATIVE
PH UR STRIP: 6 [PH] (ref 5–8)
POTASSIUM SERPL-SCNC: 4.1 MMOL/L (ref 3.5–5.3)
PROT SERPL-MCNC: 6.6 G/DL (ref 6.1–8.1)
PROT UR QL STRIP: ABNORMAL
RBC #/AREA URNS HPF: ABNORMAL /HPF
SERVICE CMNT-IMP: ABNORMAL
SODIUM SERPL-SCNC: 138 MMOL/L (ref 135–146)
SP GR UR STRIP: 1.02 (ref 1–1.03)
SQUAMOUS #/AREA URNS HPF: ABNORMAL /HPF
WBC #/AREA URNS HPF: ABNORMAL /HPF

## 2025-06-12 ENCOUNTER — OFFICE VISIT (OUTPATIENT)
Dept: NEPHROLOGY | Facility: CLINIC | Age: 70
End: 2025-06-12
Payer: MEDICARE

## 2025-06-12 ENCOUNTER — TELEPHONE (OUTPATIENT)
Dept: NEPHROLOGY | Facility: CLINIC | Age: 70
End: 2025-06-12

## 2025-06-12 VITALS
HEIGHT: 63 IN | WEIGHT: 244 LBS | HEART RATE: 102 BPM | SYSTOLIC BLOOD PRESSURE: 168 MMHG | BODY MASS INDEX: 43.23 KG/M2 | DIASTOLIC BLOOD PRESSURE: 92 MMHG

## 2025-06-12 DIAGNOSIS — I1A.0 RESISTANT HYPERTENSION: ICD-10-CM

## 2025-06-12 DIAGNOSIS — I10 BENIGN ESSENTIAL HYPERTENSION: Primary | ICD-10-CM

## 2025-06-12 DIAGNOSIS — E11.9 TYPE 2 DIABETES MELLITUS WITHOUT COMPLICATION, WITH LONG-TERM CURRENT USE OF INSULIN: ICD-10-CM

## 2025-06-12 DIAGNOSIS — N18.31 STAGE 3A CHRONIC KIDNEY DISEASE (MULTI): ICD-10-CM

## 2025-06-12 DIAGNOSIS — R80.9 ALBUMINURIA: ICD-10-CM

## 2025-06-12 DIAGNOSIS — Z79.4 TYPE 2 DIABETES MELLITUS WITHOUT COMPLICATION, WITH LONG-TERM CURRENT USE OF INSULIN: ICD-10-CM

## 2025-06-12 PROCEDURE — 3008F BODY MASS INDEX DOCD: CPT | Performed by: CLINICAL NURSE SPECIALIST

## 2025-06-12 PROCEDURE — 1160F RVW MEDS BY RX/DR IN RCRD: CPT | Performed by: CLINICAL NURSE SPECIALIST

## 2025-06-12 PROCEDURE — 3048F LDL-C <100 MG/DL: CPT | Performed by: CLINICAL NURSE SPECIALIST

## 2025-06-12 PROCEDURE — 3077F SYST BP >= 140 MM HG: CPT | Performed by: CLINICAL NURSE SPECIALIST

## 2025-06-12 PROCEDURE — 1036F TOBACCO NON-USER: CPT | Performed by: CLINICAL NURSE SPECIALIST

## 2025-06-12 PROCEDURE — 4010F ACE/ARB THERAPY RXD/TAKEN: CPT | Performed by: CLINICAL NURSE SPECIALIST

## 2025-06-12 PROCEDURE — 3044F HG A1C LEVEL LT 7.0%: CPT | Performed by: CLINICAL NURSE SPECIALIST

## 2025-06-12 PROCEDURE — 99213 OFFICE O/P EST LOW 20 MIN: CPT | Performed by: CLINICAL NURSE SPECIALIST

## 2025-06-12 PROCEDURE — 3080F DIAST BP >= 90 MM HG: CPT | Performed by: CLINICAL NURSE SPECIALIST

## 2025-06-12 PROCEDURE — 1159F MED LIST DOCD IN RCRD: CPT | Performed by: CLINICAL NURSE SPECIALIST

## 2025-06-12 RX ORDER — HYDROCHLOROTHIAZIDE 25 MG/1
25 TABLET ORAL DAILY
Qty: 30 TABLET | Refills: 11 | Status: SHIPPED | OUTPATIENT
Start: 2025-06-12 | End: 2025-06-12 | Stop reason: SDUPTHER

## 2025-06-12 RX ORDER — HYDROCHLOROTHIAZIDE 25 MG/1
25 TABLET ORAL DAILY
Qty: 30 TABLET | Refills: 11 | Status: CANCELLED | OUTPATIENT
Start: 2025-06-12 | End: 2026-06-12

## 2025-06-12 RX ORDER — HYDROCHLOROTHIAZIDE 25 MG/1
25 TABLET ORAL DAILY
Qty: 30 TABLET | Refills: 11 | Status: SHIPPED | OUTPATIENT
Start: 2025-06-12 | End: 2026-06-12

## 2025-06-12 ASSESSMENT — ENCOUNTER SYMPTOMS
RESPIRATORY NEGATIVE: 1
HEMATOLOGIC/LYMPHATIC NEGATIVE: 1
ENDOCRINE NEGATIVE: 1
ALLERGIC/IMMUNOLOGIC NEGATIVE: 1
CARDIOVASCULAR NEGATIVE: 1
PSYCHIATRIC NEGATIVE: 1
MUSCULOSKELETAL NEGATIVE: 1
EYES NEGATIVE: 1
NEUROLOGICAL NEGATIVE: 1
GASTROINTESTINAL NEGATIVE: 1
CONSTITUTIONAL NEGATIVE: 1

## 2025-06-12 NOTE — ASSESSMENT & PLAN NOTE
Excellent renal function with creatinine of 1.09, does have an albumin creatinine ratio of 126, on SGLT2, blood pressure is slightly high, will start Karendia 10 mg, I will give him samples for 2 weeks and he will see if he tolerates it, if so we can get him a prescription

## 2025-06-12 NOTE — ASSESSMENT & PLAN NOTE
Blood pressure slightly high in the office today, he does check it at home and it is better controlled at home, we will plan on restarting his hydrochlorothiazide as he is unsure why it was discontinued or who discontinued its, he will check his notes at home, if we see a reason for this being discontinued can continue to hold it, will continue with diltiazem clonidine and losartan

## 2025-06-12 NOTE — PROGRESS NOTES
Subjective   Patient ID: Av Bustos is a 69 y.o. male who presents for Follow-up (Review labs).  Patient being seen in follow-up for chronic kidney disease with history of hypertension and diabetes mellitus type 2    Labs reviewed  Urinalysis positive for glucose, on SGLT2  Albumin creatinine ratio 126  Glucose 133  Renal function with BUN of 15 and creatinine of 1.09, GFR 74  Sodium 138, potassium 4.1, chloride 103, bicarb 25  Calcium 9.2    He has been feeling well  His blood pressure is slightly high in the office today, recheck was 160/80  His hydrochlorothiazide apparently was discontinued however he is unsure who discontinued this in the last note from Dr. Goncalves says he was still on it, we will plan on restarting it however he is going to check his notes at home to see why it was discontinued  He has no edema          Review of Systems   Constitutional: Negative.    HENT: Negative.     Eyes: Negative.    Respiratory: Negative.     Cardiovascular: Negative.    Gastrointestinal: Negative.    Endocrine: Negative.    Genitourinary: Negative.    Musculoskeletal: Negative.    Skin: Negative.    Allergic/Immunologic: Negative.    Neurological: Negative.    Hematological: Negative.    Psychiatric/Behavioral: Negative.         Objective   Physical Exam  Constitutional:       Appearance: Normal appearance. He is obese.   HENT:      Head: Normocephalic and atraumatic.      Mouth/Throat:      Mouth: Mucous membranes are moist.   Eyes:      Extraocular Movements: Extraocular movements intact.   Cardiovascular:      Rate and Rhythm: Normal rate and regular rhythm.      Heart sounds: Normal heart sounds.   Pulmonary:      Effort: Pulmonary effort is normal.      Breath sounds: Normal breath sounds.   Abdominal:      General: Bowel sounds are normal.      Palpations: Abdomen is soft.   Musculoskeletal:         General: Normal range of motion.   Skin:     General: Skin is warm and dry.   Neurological:      Mental  Status: He is alert.   Psychiatric:         Behavior: Behavior normal.         Thought Content: Thought content normal.         Assessment/Plan   Problem List Items Addressed This Visit           ICD-10-CM    Benign essential hypertension - Primary I10    Blood pressure slightly high in the office today, he does check it at home and it is better controlled at home, we will plan on restarting his hydrochlorothiazide as he is unsure why it was discontinued or who discontinued its, he will check his notes at home, if we see a reason for this being discontinued can continue to hold it, will continue with diltiazem clonidine and losartan         Relevant Medications    finerenone (Kerendia) 10 mg tablet tablet    Type 2 diabetes mellitus without complications E11.9    Relevant Medications    finerenone (Kerendia) 10 mg tablet tablet    Other Relevant Orders    Basic metabolic panel    Stage 3a chronic kidney disease (Multi) N18.31    Excellent renal function with creatinine of 1.09, does have an albumin creatinine ratio of 126, on SGLT2, blood pressure is slightly high, will start Karendia 10 mg, I will give him samples for 2 weeks and he will see if he tolerates it, if so we can get him a prescription         Relevant Medications    finerenone (Kerendia) 10 mg tablet tablet    Other Relevant Orders    Comprehensive Metabolic Panel    Urinalysis with Reflex Microscopic    Albumin-Creatinine Ratio, Urine Random    Basic metabolic panel    Basic metabolic panel    Follow Up In Nephrology    Albumin-Creatinine Ratio, Urine Random    Urinalysis with Reflex Microscopic     Other Visit Diagnoses         Codes      Albuminuria     R80.9    Relevant Medications    finerenone (Kerendia) 10 mg tablet tablet    Other Relevant Orders    Basic metabolic panel      Resistant hypertension     I1A.0    Relevant Medications    hydroCHLOROthiazide (HYDRODiuril) 25 mg tablet          Hypertension  Chronic kidney disease stage II/IIIa with  baseline creatinine 1.1-1.4  Diabetes mellitus type 2  Obesity          Adela Arboleda, DEVENDRA-[place], [facility] 06/12/25 3:07 PM

## 2025-06-23 DIAGNOSIS — N18.31 STAGE 3A CHRONIC KIDNEY DISEASE (MULTI): ICD-10-CM

## 2025-06-23 DIAGNOSIS — I10 HYPERTENSION, UNSPECIFIED TYPE: ICD-10-CM

## 2025-06-23 RX ORDER — METOPROLOL SUCCINATE 100 MG/1
100 TABLET, EXTENDED RELEASE ORAL DAILY
Qty: 90 TABLET | Refills: 3 | Status: SHIPPED | OUTPATIENT
Start: 2025-06-23 | End: 2026-06-23

## 2025-06-26 DIAGNOSIS — R80.9 ALBUMINURIA: ICD-10-CM

## 2025-06-26 DIAGNOSIS — Z79.4 TYPE 2 DIABETES MELLITUS WITHOUT COMPLICATION, WITH LONG-TERM CURRENT USE OF INSULIN: ICD-10-CM

## 2025-06-26 DIAGNOSIS — N18.31 STAGE 3A CHRONIC KIDNEY DISEASE (MULTI): ICD-10-CM

## 2025-06-26 DIAGNOSIS — E11.9 TYPE 2 DIABETES MELLITUS WITHOUT COMPLICATION, WITH LONG-TERM CURRENT USE OF INSULIN: ICD-10-CM

## 2025-06-26 LAB
ALBUMIN/CREAT UR: 35 MG/G CREAT
ANION GAP SERPL CALCULATED.4IONS-SCNC: 13 MMOL/L (CALC) (ref 7–17)
APPEARANCE UR: CLEAR
BILIRUB UR QL STRIP: NEGATIVE
BUN SERPL-MCNC: 27 MG/DL (ref 7–25)
BUN/CREAT SERPL: 19 (CALC) (ref 6–22)
CALCIUM SERPL-MCNC: 9.6 MG/DL (ref 8.6–10.3)
CHLORIDE SERPL-SCNC: 104 MMOL/L (ref 98–110)
CO2 SERPL-SCNC: 25 MMOL/L (ref 20–32)
COLOR UR: YELLOW
CREAT SERPL-MCNC: 1.45 MG/DL (ref 0.7–1.35)
CREAT UR-MCNC: 116 MG/DL (ref 20–320)
EGFRCR SERPLBLD CKD-EPI 2021: 52 ML/MIN/1.73M2
GLUCOSE SERPL-MCNC: 71 MG/DL (ref 65–99)
GLUCOSE UR QL STRIP: ABNORMAL
HGB UR QL STRIP: NEGATIVE
KETONES UR QL STRIP: NEGATIVE
LEUKOCYTE ESTERASE UR QL STRIP: NEGATIVE
MICROALBUMIN UR-MCNC: 4.1 MG/DL
NITRITE UR QL STRIP: NEGATIVE
PH UR STRIP: 5.5 [PH] (ref 5–8)
POTASSIUM SERPL-SCNC: 4.5 MMOL/L (ref 3.5–5.3)
PROT UR QL STRIP: NEGATIVE
SODIUM SERPL-SCNC: 142 MMOL/L (ref 135–146)
SP GR UR STRIP: 1.03 (ref 1–1.03)

## 2025-07-02 DIAGNOSIS — N18.31 STAGE 3A CHRONIC KIDNEY DISEASE (MULTI): ICD-10-CM

## 2025-07-02 DIAGNOSIS — Z79.4 TYPE 2 DIABETES MELLITUS WITHOUT COMPLICATION, WITH LONG-TERM CURRENT USE OF INSULIN: ICD-10-CM

## 2025-07-02 DIAGNOSIS — E66.01 OBESITY, MORBID, BMI 40.0-49.9 (MULTI): ICD-10-CM

## 2025-07-02 DIAGNOSIS — E11.9 TYPE 2 DIABETES MELLITUS WITHOUT COMPLICATION, WITH LONG-TERM CURRENT USE OF INSULIN: ICD-10-CM

## 2025-07-02 PROCEDURE — RXMED WILLOW AMBULATORY MEDICATION CHARGE

## 2025-07-02 RX ORDER — SEMAGLUTIDE 1.34 MG/ML
1 INJECTION, SOLUTION SUBCUTANEOUS WEEKLY
Qty: 3 ML | Refills: 2 | Status: SHIPPED | OUTPATIENT
Start: 2025-07-02 | End: 2025-09-25

## 2025-07-03 ENCOUNTER — PHARMACY VISIT (OUTPATIENT)
Dept: PHARMACY | Facility: CLINIC | Age: 70
End: 2025-07-03
Payer: COMMERCIAL

## 2025-07-03 LAB
ANION GAP SERPL CALCULATED.4IONS-SCNC: 10 MMOL/L (CALC) (ref 7–17)
BUN SERPL-MCNC: 19 MG/DL (ref 7–25)
BUN/CREAT SERPL: NORMAL (CALC) (ref 6–22)
CALCIUM SERPL-MCNC: 9.3 MG/DL (ref 8.6–10.3)
CHLORIDE SERPL-SCNC: 104 MMOL/L (ref 98–110)
CO2 SERPL-SCNC: 25 MMOL/L (ref 20–32)
CREAT SERPL-MCNC: 1.26 MG/DL (ref 0.7–1.35)
EGFRCR SERPLBLD CKD-EPI 2021: 62 ML/MIN/1.73M2
GLUCOSE SERPL-MCNC: 79 MG/DL (ref 65–99)
POTASSIUM SERPL-SCNC: 4.4 MMOL/L (ref 3.5–5.3)
SODIUM SERPL-SCNC: 139 MMOL/L (ref 135–146)

## 2025-07-09 ENCOUNTER — TELEPHONE (OUTPATIENT)
Age: 70
End: 2025-07-09
Payer: MEDICARE

## 2025-07-09 ENCOUNTER — TELEPHONE (OUTPATIENT)
Dept: NEPHROLOGY | Facility: CLINIC | Age: 70
End: 2025-07-09
Payer: MEDICARE

## 2025-07-09 DIAGNOSIS — Z79.4 TYPE 2 DIABETES MELLITUS WITHOUT COMPLICATION, WITH LONG-TERM CURRENT USE OF INSULIN: ICD-10-CM

## 2025-07-09 DIAGNOSIS — E11.9 TYPE 2 DIABETES MELLITUS WITHOUT COMPLICATION, WITH LONG-TERM CURRENT USE OF INSULIN: ICD-10-CM

## 2025-07-09 DIAGNOSIS — I10 BENIGN ESSENTIAL HYPERTENSION: ICD-10-CM

## 2025-07-09 DIAGNOSIS — N18.31 STAGE 3A CHRONIC KIDNEY DISEASE (MULTI): ICD-10-CM

## 2025-07-09 DIAGNOSIS — R80.9 ALBUMINURIA: ICD-10-CM

## 2025-07-09 ASSESSMENT — ENCOUNTER SYMPTOMS
NERVOUS/ANXIOUS: 1
SEIZURES: 0
BLACKOUTS: 0
SPEECH DIFFICULTY: 0
WEIGHT LOSS: 1
SWEATS: 0
FATIGUE: 1
POLYPHAGIA: 0
VISUAL CHANGE: 1
CONFUSION: 0
BLURRED VISION: 1
WEAKNESS: 1
DIZZINESS: 0
HUNGER: 1
HEADACHES: 0
TREMORS: 0
POLYDIPSIA: 0

## 2025-07-09 NOTE — TELEPHONE ENCOUNTER
PATIENT CALLED,SAID DR LU  STARTED HIM ON  KERRENDIA 10 MG  1 PER DAY.   PATIENT SAYS YOU NEED TO ORDER THIS FOR  HIM.  ORDER GOES TO [facility].

## 2025-07-09 NOTE — PROGRESS NOTES
"Pt called states that you were planning to speak to Dr. Rodríguez about him sending an rx for Kerendia to [facility] so that he \"can get it for free\". He states he will be running out Kerendia on 7/17. He also would like to get samples if they are available.   "

## 2025-07-09 NOTE — TELEPHONE ENCOUNTER
Pt notified and states he will  samples on 7/14/25. He has appt with Dr. Rodríguez on 7/16/25 and will discuss further with Dr. Rodríguez.

## 2025-07-10 PROCEDURE — RXMED WILLOW AMBULATORY MEDICATION CHARGE

## 2025-07-11 ENCOUNTER — PHARMACY VISIT (OUTPATIENT)
Dept: PHARMACY | Facility: CLINIC | Age: 70
End: 2025-07-11
Payer: COMMERCIAL

## 2025-07-15 LAB
ALBUMIN SERPL-MCNC: 4.2 G/DL (ref 3.6–5.1)
ALBUMIN SERPL-MCNC: 4.3 G/DL (ref 3.6–5.1)
ALP SERPL-CCNC: 53 U/L (ref 35–144)
ALP SERPL-CCNC: 53 U/L (ref 35–144)
ALT SERPL-CCNC: 14 U/L (ref 9–46)
ALT SERPL-CCNC: 14 U/L (ref 9–46)
ANION GAP SERPL CALCULATED.4IONS-SCNC: 11 MMOL/L (CALC) (ref 7–17)
ANION GAP SERPL CALCULATED.4IONS-SCNC: 8 MMOL/L (CALC) (ref 7–17)
AST SERPL-CCNC: 19 U/L (ref 10–35)
AST SERPL-CCNC: 19 U/L (ref 10–35)
BASOPHILS # BLD AUTO: 29 CELLS/UL (ref 0–200)
BASOPHILS # BLD AUTO: 43 CELLS/UL (ref 0–200)
BASOPHILS NFR BLD AUTO: 0.4 %
BASOPHILS NFR BLD AUTO: 0.6 %
BILIRUB SERPL-MCNC: 0.3 MG/DL (ref 0.2–1.2)
BILIRUB SERPL-MCNC: 0.4 MG/DL (ref 0.2–1.2)
BUN SERPL-MCNC: 15 MG/DL (ref 7–25)
BUN SERPL-MCNC: 16 MG/DL (ref 7–25)
CALCIUM SERPL-MCNC: 9.3 MG/DL (ref 8.6–10.3)
CALCIUM SERPL-MCNC: 9.5 MG/DL (ref 8.6–10.3)
CHLORIDE SERPL-SCNC: 105 MMOL/L (ref 98–110)
CHLORIDE SERPL-SCNC: 105 MMOL/L (ref 98–110)
CO2 SERPL-SCNC: 26 MMOL/L (ref 20–32)
CO2 SERPL-SCNC: 28 MMOL/L (ref 20–32)
CREAT SERPL-MCNC: 1.13 MG/DL (ref 0.7–1.35)
CREAT SERPL-MCNC: 1.2 MG/DL (ref 0.7–1.35)
EGFRCR SERPLBLD CKD-EPI 2021: 66 ML/MIN/1.73M2
EGFRCR SERPLBLD CKD-EPI 2021: 71 ML/MIN/1.73M2
EOSINOPHIL # BLD AUTO: 37 CELLS/UL (ref 15–500)
EOSINOPHIL # BLD AUTO: 43 CELLS/UL (ref 15–500)
EOSINOPHIL NFR BLD AUTO: 0.5 %
EOSINOPHIL NFR BLD AUTO: 0.6 %
ERYTHROCYTE [DISTWIDTH] IN BLOOD BY AUTOMATED COUNT: 14.5 % (ref 11–15)
ERYTHROCYTE [DISTWIDTH] IN BLOOD BY AUTOMATED COUNT: 14.6 % (ref 11–15)
EST. AVERAGE GLUCOSE BLD GHB EST-MCNC: 143 MG/DL
EST. AVERAGE GLUCOSE BLD GHB EST-MCNC: 146 MG/DL
EST. AVERAGE GLUCOSE BLD GHB EST-SCNC: 7.9 MMOL/L
EST. AVERAGE GLUCOSE BLD GHB EST-SCNC: 8.1 MMOL/L
GLUCOSE SERPL-MCNC: 59 MG/DL (ref 65–99)
GLUCOSE SERPL-MCNC: 63 MG/DL (ref 65–99)
HBA1C MFR BLD: 6.6 %
HBA1C MFR BLD: 6.7 %
HCT VFR BLD AUTO: 47.6 % (ref 38.5–50)
HCT VFR BLD AUTO: 48 % (ref 38.5–50)
HGB BLD-MCNC: 15 G/DL (ref 13.2–17.1)
HGB BLD-MCNC: 15.1 G/DL (ref 13.2–17.1)
LDLC SERPL DIRECT ASSAY-MCNC: 73 MG/DL
LYMPHOCYTES # BLD AUTO: 1243 CELLS/UL (ref 850–3900)
LYMPHOCYTES # BLD AUTO: 1285 CELLS/UL (ref 850–3900)
LYMPHOCYTES NFR BLD AUTO: 17.5 %
LYMPHOCYTES NFR BLD AUTO: 17.6 %
MCH RBC QN AUTO: 29.2 PG (ref 27–33)
MCH RBC QN AUTO: 29.2 PG (ref 27–33)
MCHC RBC AUTO-ENTMCNC: 31.3 G/DL (ref 32–36)
MCHC RBC AUTO-ENTMCNC: 31.7 G/DL (ref 32–36)
MCV RBC AUTO: 92.1 FL (ref 80–100)
MCV RBC AUTO: 93.4 FL (ref 80–100)
MONOCYTES # BLD AUTO: 540 CELLS/UL (ref 200–950)
MONOCYTES # BLD AUTO: 547 CELLS/UL (ref 200–950)
MONOCYTES NFR BLD AUTO: 7.4 %
MONOCYTES NFR BLD AUTO: 7.7 %
NEUTROPHILS # BLD AUTO: 5226 CELLS/UL (ref 1500–7800)
NEUTROPHILS # BLD AUTO: 5409 CELLS/UL (ref 1500–7800)
NEUTROPHILS NFR BLD AUTO: 73.6 %
NEUTROPHILS NFR BLD AUTO: 74.1 %
PLATELET # BLD AUTO: 194 THOUSAND/UL (ref 140–400)
PLATELET # BLD AUTO: 196 THOUSAND/UL (ref 140–400)
PMV BLD REES-ECKER: 10 FL (ref 7.5–12.5)
PMV BLD REES-ECKER: 10.6 FL (ref 7.5–12.5)
POTASSIUM SERPL-SCNC: 4.6 MMOL/L (ref 3.5–5.3)
POTASSIUM SERPL-SCNC: 4.6 MMOL/L (ref 3.5–5.3)
PROT SERPL-MCNC: 6.9 G/DL (ref 6.1–8.1)
PROT SERPL-MCNC: 6.9 G/DL (ref 6.1–8.1)
RBC # BLD AUTO: 5.14 MILLION/UL (ref 4.2–5.8)
RBC # BLD AUTO: 5.17 MILLION/UL (ref 4.2–5.8)
SODIUM SERPL-SCNC: 141 MMOL/L (ref 135–146)
SODIUM SERPL-SCNC: 142 MMOL/L (ref 135–146)
VIT B12 SERPL-MCNC: 374 PG/ML (ref 200–1100)
WBC # BLD AUTO: 7.1 THOUSAND/UL (ref 3.8–10.8)
WBC # BLD AUTO: 7.3 THOUSAND/UL (ref 3.8–10.8)

## 2025-07-16 ENCOUNTER — APPOINTMENT (OUTPATIENT)
Age: 70
End: 2025-07-16
Payer: MEDICARE

## 2025-07-16 VITALS
HEIGHT: 63 IN | WEIGHT: 238.6 LBS | HEART RATE: 76 BPM | DIASTOLIC BLOOD PRESSURE: 80 MMHG | OXYGEN SATURATION: 96 % | SYSTOLIC BLOOD PRESSURE: 140 MMHG | BODY MASS INDEX: 42.28 KG/M2

## 2025-07-16 DIAGNOSIS — Z00.00 ROUTINE GENERAL MEDICAL EXAMINATION AT HEALTH CARE FACILITY: Primary | ICD-10-CM

## 2025-07-16 DIAGNOSIS — K21.9 GASTROESOPHAGEAL REFLUX DISEASE WITHOUT ESOPHAGITIS: ICD-10-CM

## 2025-07-16 DIAGNOSIS — N18.31 STAGE 3A CHRONIC KIDNEY DISEASE (MULTI): ICD-10-CM

## 2025-07-16 DIAGNOSIS — E11.9 TYPE 2 DIABETES MELLITUS WITHOUT COMPLICATION, WITH LONG-TERM CURRENT USE OF INSULIN: ICD-10-CM

## 2025-07-16 DIAGNOSIS — D64.9 ANEMIA, UNSPECIFIED TYPE: ICD-10-CM

## 2025-07-16 DIAGNOSIS — E66.01 OBESITY, MORBID, BMI 40.0-49.9 (MULTI): ICD-10-CM

## 2025-07-16 DIAGNOSIS — Z79.4 TYPE 2 DIABETES MELLITUS WITHOUT COMPLICATION, WITH LONG-TERM CURRENT USE OF INSULIN: ICD-10-CM

## 2025-07-16 DIAGNOSIS — I10 BENIGN ESSENTIAL HYPERTENSION: ICD-10-CM

## 2025-07-16 DIAGNOSIS — Z12.11 COLON CANCER SCREENING: ICD-10-CM

## 2025-07-16 DIAGNOSIS — M17.11 ARTHRITIS OF RIGHT KNEE: ICD-10-CM

## 2025-07-16 DIAGNOSIS — E78.5 DYSLIPIDEMIA: ICD-10-CM

## 2025-07-16 DIAGNOSIS — F41.1 GENERALIZED ANXIETY DISORDER: ICD-10-CM

## 2025-07-16 DIAGNOSIS — E53.8 B12 DEFICIENCY: ICD-10-CM

## 2025-07-16 DIAGNOSIS — Z12.5 SCREENING FOR PROSTATE CANCER: ICD-10-CM

## 2025-07-16 DIAGNOSIS — G47.33 OBSTRUCTIVE SLEEP APNEA, ADULT: ICD-10-CM

## 2025-07-16 PROCEDURE — 1036F TOBACCO NON-USER: CPT | Performed by: FAMILY MEDICINE

## 2025-07-16 PROCEDURE — 1159F MED LIST DOCD IN RCRD: CPT | Performed by: FAMILY MEDICINE

## 2025-07-16 PROCEDURE — 4010F ACE/ARB THERAPY RXD/TAKEN: CPT | Performed by: FAMILY MEDICINE

## 2025-07-16 PROCEDURE — 3008F BODY MASS INDEX DOCD: CPT | Performed by: FAMILY MEDICINE

## 2025-07-16 PROCEDURE — 3077F SYST BP >= 140 MM HG: CPT | Performed by: FAMILY MEDICINE

## 2025-07-16 PROCEDURE — 1124F ACP DISCUSS-NO DSCNMKR DOCD: CPT | Performed by: FAMILY MEDICINE

## 2025-07-16 PROCEDURE — 1170F FXNL STATUS ASSESSED: CPT | Performed by: FAMILY MEDICINE

## 2025-07-16 PROCEDURE — 99214 OFFICE O/P EST MOD 30 MIN: CPT | Performed by: FAMILY MEDICINE

## 2025-07-16 PROCEDURE — 20610 DRAIN/INJ JOINT/BURSA W/O US: CPT | Performed by: FAMILY MEDICINE

## 2025-07-16 PROCEDURE — 3079F DIAST BP 80-89 MM HG: CPT | Performed by: FAMILY MEDICINE

## 2025-07-16 PROCEDURE — G0439 PPPS, SUBSEQ VISIT: HCPCS | Performed by: FAMILY MEDICINE

## 2025-07-16 PROCEDURE — 1160F RVW MEDS BY RX/DR IN RCRD: CPT | Performed by: FAMILY MEDICINE

## 2025-07-16 RX ORDER — TRIAMCINOLONE ACETONIDE 40 MG/ML
40 INJECTION, SUSPENSION INTRA-ARTICULAR; INTRAMUSCULAR
Status: COMPLETED | OUTPATIENT
Start: 2025-07-16 | End: 2025-07-16

## 2025-07-16 RX ADMIN — TRIAMCINOLONE ACETONIDE 40 MG: 40 INJECTION, SUSPENSION INTRA-ARTICULAR; INTRAMUSCULAR at 13:12

## 2025-07-16 ASSESSMENT — ENCOUNTER SYMPTOMS
ARTHRALGIAS: 1
DEPRESSION: 0
BLURRED VISION: 1
DIFFICULTY URINATING: 0
LIGHT-HEADEDNESS: 0
UNEXPECTED WEIGHT CHANGE: 0
CONFUSION: 0
VOMITING: 0
FATIGUE: 1
DIARRHEA: 0
POLYDIPSIA: 0
COUGH: 0
ADENOPATHY: 0
SLEEP DISTURBANCE: 0
ABDOMINAL DISTENTION: 0
LOSS OF SENSATION IN FEET: 0
APPETITE CHANGE: 0
RHINORRHEA: 0
TROUBLE SWALLOWING: 0
ABDOMINAL PAIN: 0
SHORTNESS OF BREATH: 0
NUMBNESS: 0
POLYPHAGIA: 0
BLACKOUTS: 0
SPEECH DIFFICULTY: 0
WHEEZING: 0
NAUSEA: 0
VISUAL CHANGE: 1
HUNGER: 1
SEIZURES: 0
ACTIVITY CHANGE: 0
NERVOUS/ANXIOUS: 1
WEIGHT LOSS: 1
HEADACHES: 0
WEAKNESS: 1
PALPITATIONS: 0
DIZZINESS: 0
TREMORS: 0
OCCASIONAL FEELINGS OF UNSTEADINESS: 0
CONSTIPATION: 0
SWEATS: 0

## 2025-07-16 ASSESSMENT — ACTIVITIES OF DAILY LIVING (ADL)
MANAGING_FINANCES: INDEPENDENT
DRESSING: INDEPENDENT
BATHING: INDEPENDENT
GROCERY_SHOPPING: INDEPENDENT
TAKING_MEDICATION: INDEPENDENT
DOING_HOUSEWORK: INDEPENDENT

## 2025-07-16 ASSESSMENT — PATIENT HEALTH QUESTIONNAIRE - PHQ9
4. FEELING TIRED OR HAVING LITTLE ENERGY: MORE THAN HALF THE DAYS
6. FEELING BAD ABOUT YOURSELF - OR THAT YOU ARE A FAILURE OR HAVE LET YOURSELF OR YOUR FAMILY DOWN: NOT AT ALL
3. TROUBLE FALLING OR STAYING ASLEEP OR SLEEPING TOO MUCH: SEVERAL DAYS
SUM OF ALL RESPONSES TO PHQ QUESTIONS 1-9: 5
7. TROUBLE CONCENTRATING ON THINGS, SUCH AS READING THE NEWSPAPER OR WATCHING TELEVISION: NOT AT ALL
9. THOUGHTS THAT YOU WOULD BE BETTER OFF DEAD, OR OF HURTING YOURSELF: NOT AT ALL
5. POOR APPETITE OR OVEREATING: NOT AT ALL
SUM OF ALL RESPONSES TO PHQ9 QUESTIONS 1 AND 2: 2
2. FEELING DOWN, DEPRESSED OR HOPELESS: SEVERAL DAYS
1. LITTLE INTEREST OR PLEASURE IN DOING THINGS: SEVERAL DAYS
8. MOVING OR SPEAKING SO SLOWLY THAT OTHER PEOPLE COULD HAVE NOTICED. OR THE OPPOSITE, BEING SO FIGETY OR RESTLESS THAT YOU HAVE BEEN MOVING AROUND A LOT MORE THAN USUAL: NOT AT ALL

## 2025-07-16 NOTE — ASSESSMENT & PLAN NOTE
Lots of stress taking care of wife, emotionally seems to be stable, continue with current medication very rarely has to use lorazepam

## 2025-07-16 NOTE — ASSESSMENT & PLAN NOTE
Patient has been using their CPAP nightly and is experiencing restful sleep, no morning headaches, no witnessed snoring, and notices a difference if they do not use the device.    Orders:    Follow Up In Primary Care - Established    Follow Up In Primary Care - Established; Future

## 2025-07-16 NOTE — ASSESSMENT & PLAN NOTE
Tolerating low-dose rosuvastatin  Orders:    Follow Up In Primary Care - Established    Follow Up In Primary Care - Established; Future    Comprehensive Metabolic Panel; Future    Lipid Panel; Future

## 2025-07-16 NOTE — ASSESSMENT & PLAN NOTE
Injected the right knee today  Orders:    Follow Up In Primary Care - Established    Follow Up In Primary Care - Established; Future

## 2025-07-16 NOTE — ASSESSMENT & PLAN NOTE
A1c testing still below 7, has lost 20 pounds in the past 6 months, continue with current medications, cautioned about using fast acting insulin except for mealtime.  Patient on average only uses this a couple times a week, if continues to have issues with low sugars we will consider reducing basal insulin.  Orders:    Follow Up In Primary Care - Established    Follow Up In Primary Care - Established; Future    Comprehensive Metabolic Panel; Future    Hemoglobin A1C; Future    Lipid Panel; Future    TSH with reflex to Free T4 if abnormal; Future

## 2025-07-16 NOTE — ASSESSMENT & PLAN NOTE
Recent blood testing showed normalization of creatinine, GFR now over 60.  Nephrology did add Emilie Erica to the patient's regimen, microalbumin has improved, patient is very sensitive to diuretic medications.  Orders:    Follow Up In Primary Care - Established    Follow Up In Primary Care - Established; Future    CBC and Auto Differential; Future    Comprehensive Metabolic Panel; Future

## 2025-07-16 NOTE — PROGRESS NOTES
Subjective   Reason for Visit: Av Bustos is an 69 y.o. male here for a Medicare Wellness visit.     Past Medical, Surgical, and Family History reviewed and updated in chart.    Reviewed all medications by prescribing practitioner or clinical pharmacist (such as prescriptions, OTCs, herbal therapies and supplements) and documented in the medical record.    Diabetes  He has type 2 diabetes mellitus. The initial diagnosis of diabetes was made 10 years ago. Hypoglycemia symptoms include hunger, mood changes, nervousness/anxiousness and sleepiness. Pertinent negatives for hypoglycemia include no confusion, dizziness, headaches, pallor, seizures, speech difficulty, sweats or tremors. Associated symptoms include blurred vision, fatigue, polyuria, visual change, weakness and weight loss. Pertinent negatives for diabetes include no chest pain, no foot paresthesias, no foot ulcerations, no polydipsia and no polyphagia. Hypoglycemia complications include nocturnal hypoglycemia. Pertinent negatives for hypoglycemia complications include no blackouts, no hospitalization, no required assistance and no required glucagon injection. Symptoms are worsening. Diabetic complications include nephropathy and retinopathy. Pertinent negatives for diabetic complications include no CVA, heart disease, impotence, peripheral neuropathy or PVD. Risk factors for coronary artery disease include dyslipidemia, family history, hypertension, obesity and stress. Current diabetic treatment includes diet, insulin injections and oral agent (triple therapy). He is compliant with treatment most of the time. He is currently taking insulin pre-breakfast, pre-lunch and pre-dinner. Insulin injections are given by patient. Rotation sites for injection include the abdominal wall. His weight is fluctuating minimally. He is following a generally unhealthy diet. Meal planning includes avoidance of concentrated sweets. He has not had a previous visit with a  dietitian. He participates in exercise intermittently. He monitors blood glucose at home 5+ x per day. He monitors urine at home <1 x per month. Blood glucose monitoring compliance is good. His home blood glucose trend is fluctuating minimally. His breakfast blood glucose is taken between 9-10 am. His breakfast blood glucose range is generally 110-130 mg/dl. His lunch blood glucose is taken between 1-2 pm. His lunch blood glucose range is generally 110-130 mg/dl. His dinner blood glucose is taken between 6-7 pm. His dinner blood glucose range is generally 110-130 mg/dl. His bedtime blood glucose is taken after 11 pm. His bedtime blood glucose range is generally 130-140 mg/dl. His overall blood glucose range is 110-130 mg/dl. He does not see a podiatrist.Eye exam is current.     No low blood sugars since last OV, seen opthalmology in the past year, and no numbness or tingling in feet, skin normal.  No headache, chest pain, shortness of breath, dizziness, lightheadedness, or edema  Renal started Kerendia  Seen cardiology in April  Uses Novolog occasionally 2 times a week (14-16 units at a time)  + fatigue at times      Patient Care Team:  Matthew Rodríguez MD as PCP - General (Family Medicine)  Matthew Rodríguez [facility] PCP [facility]     Review of Systems   Constitutional:  Positive for fatigue and weight loss. Negative for activity change, appetite change and unexpected weight change.   HENT:  Negative for congestion, ear pain, nosebleeds, rhinorrhea, sneezing and trouble swallowing.    Eyes:  Positive for blurred vision.   Respiratory:  Negative for cough, shortness of breath and wheezing.    Cardiovascular:  Negative for chest pain, palpitations and leg swelling.   Gastrointestinal:  Negative for abdominal distention, abdominal pain, constipation, diarrhea, nausea and vomiting.   Endocrine: Positive for polyuria. Negative for polydipsia and polyphagia.   Genitourinary:  Negative for  "difficulty urinating and impotence.   Musculoskeletal:  Positive for arthralgias.   Skin:  Negative for pallor and rash.   Neurological:  Positive for weakness. Negative for dizziness, tremors, seizures, speech difficulty, light-headedness, numbness and headaches.   Hematological:  Negative for adenopathy.   Psychiatric/Behavioral:  Negative for behavioral problems, confusion and sleep disturbance. The patient is nervous/anxious.    All other systems reviewed and are negative.      Objective   Vitals:  /80   Pulse 76   Ht 1.6 m (5' 3\")   Wt 108 kg (238 lb 9.6 oz)   SpO2 96%   BMI 42.27 kg/m²       Physical Exam  Vitals and nursing note reviewed.   Constitutional:       Appearance: Normal appearance.   HENT:      Head: Normocephalic and atraumatic.      Right Ear: Tympanic membrane, ear canal and external ear normal.      Left Ear: Tympanic membrane, ear canal and external ear normal.      Nose: Nose normal.      Mouth/Throat:      Mouth: Mucous membranes are moist.      Pharynx: Oropharynx is clear.     Cardiovascular:      Rate and Rhythm: Normal rate and regular rhythm.      Pulses: Normal pulses.      Heart sounds: Normal heart sounds.   Pulmonary:      Effort: Pulmonary effort is normal.      Breath sounds: Normal breath sounds.     Musculoskeletal:      Cervical back: Normal range of motion and neck supple.     Skin:     General: Skin is warm and dry.      Capillary Refill: Capillary refill takes less than 2 seconds.     Neurological:      Mental Status: He is alert.     Psychiatric:         Mood and Affect: Mood normal.         Behavior: Behavior normal.       Joint Injection Large/Arthrocentesis: R knee on 7/16/2025 1:12 PM  Indications: pain  Details: 25 G needle, anterolateral approach  Medications: 40 mg triamcinolone acetonide 40 mg/mL  Outcome: tolerated well, no immediate complications    2 cc 0.5% bupivacaine used as well  Procedure, treatment alternatives, risks and benefits explained, " specific risks discussed. Consent was given by the patient.           Assessment & Plan  Benign essential hypertension  Blood pressure is stable today, patient was very sensitive to hydrochlorothiazide, recent blood testing showed normal renal function, continue with current medication.  Orders:    Follow Up In Primary Care - [facility]    Follow Up In Primary Care - Established; Future    Comprehensive Metabolic Panel; Future    Dyslipidemia  Tolerating low-dose rosuvastatin  Orders:    Follow Up In Primary Care - Established    Follow Up In Primary Care - Established; Future    Comprehensive Metabolic Panel; Future    Lipid Panel; Future    Type 2 diabetes mellitus without complication, with long-term current use of insulin  A1c testing still below 7, has lost 20 pounds in the past 6 months, continue with current medications, cautioned about using fast acting insulin except for mealtime.  Patient on average only uses this a couple times a week, if continues to have issues with low sugars we will consider reducing basal insulin.  Orders:    Follow Up In Primary Care - Established    Follow Up In Primary Care - Established; Future    Comprehensive Metabolic Panel; Future    Hemoglobin A1C; Future    Lipid Panel; Future    TSH with reflex to Free T4 if abnormal; Future    Stage 3a chronic kidney disease (Multi)  Recent blood testing showed normalization of creatinine, GFR now over 60.  Nephrology did add Emilie Erica to the patient's regimen, microalbumin has improved, patient is very sensitive to diuretic medications.  Orders:    Follow Up In Primary Care - Established    Follow Up In Primary Care - Established; Future    CBC and Auto Differential; Future    Comprehensive Metabolic Panel; Future    B12 deficiency    Orders:    Follow Up In Primary Care - Established    Follow Up In Primary Care - Established; Future    Anemia, unspecified type    Orders:    Follow Up In Primary Care - Established    Follow Up In  Primary Care - Established; Future    CBC and Auto Differential; Future    Obesity, morbid, BMI 40.0-49.9 (Multi)    Orders:    Follow Up In Primary Care - [facility]    Follow Up In Primary Care - [facility]; Future    Gastroesophageal reflux disease without esophagitis  Stable with 20 mg omeprazole.  Orders:    Follow Up In Primary Care - [facility]    Follow Up In Primary Care - [facility]; Future    Arthritis of right knee  Injected the right knee today  Orders:    Follow Up In Primary Care - [facility]    Follow Up In Primary Care [facility]; Future    Obstructive sleep apnea, adult  Patient has been using their CPAP nightly and is experiencing restful sleep, no morning headaches, no witnessed snoring, and notices a difference if they do not use the device.    Orders:    Follow Up In Primary Care - [facility]    Follow Up In Primary Care - [facility]; Future    Routine general medical examination at health care facility    Orders:    Follow Up In Primary Care - [facility]; Future    Screening for prostate cancer    Orders:    Follow Up In Primary Care - [facility]; Future    Prostate Specific Antigen, Screen; Future    Colon cancer screening    Orders:    Colonoscopy Screening; Average Risk Patient; Future    Generalized anxiety disorder  Lots of stress taking care of wife, emotionally seems to be stable, continue with current medication very rarely has to use lorazepam                 Patient was identified as a fall risk. Risk prevention instructions provided.

## 2025-07-16 NOTE — ASSESSMENT & PLAN NOTE
Stable with 20 mg omeprazole.  Orders:    Follow Up In Primary Care - Established    Follow Up In Primary Care - Established; Future

## 2025-07-21 ENCOUNTER — TELEPHONE (OUTPATIENT)
Dept: NEPHROLOGY | Facility: CLINIC | Age: 70
End: 2025-07-21
Payer: MEDICARE

## 2025-07-21 DIAGNOSIS — E78.5 DYSLIPIDEMIA: Primary | ICD-10-CM

## 2025-07-21 RX ORDER — ROSUVASTATIN CALCIUM 5 MG/1
5 TABLET, COATED ORAL DAILY
Qty: 90 TABLET | Refills: 3 | Status: SHIPPED | OUTPATIENT
Start: 2025-07-21

## 2025-07-21 NOTE — TELEPHONE ENCOUNTER
Pt to hold Farxiga x 3 days prior to Colonoscopy and may resume taking Farxiga the next day after procedure per Adela Arboleda, WOLFGANG, APRN-CNP. Pt notified and voiced understanding.

## 2025-07-21 NOTE — PROGRESS NOTES
Pt called states he is scheduled to have a Colonoscopy on 7/30. On his instructions it states that he should hold his Farxiga 3-4 days prior to Colonoscopy & also to consult with provider who prescribed. Please advise.

## 2025-07-27 PROCEDURE — RXMED WILLOW AMBULATORY MEDICATION CHARGE

## 2025-07-29 ENCOUNTER — PHARMACY VISIT (OUTPATIENT)
Dept: PHARMACY | Facility: CLINIC | Age: 70
End: 2025-07-29
Payer: COMMERCIAL

## 2025-07-30 ENCOUNTER — HOSPITAL ENCOUNTER (OUTPATIENT)
Dept: OPERATING ROOM | Facility: HOSPITAL | Age: 70
Discharge: HOME | End: 2025-07-30
Payer: MEDICARE

## 2025-07-30 VITALS
DIASTOLIC BLOOD PRESSURE: 88 MMHG | RESPIRATION RATE: 16 BRPM | OXYGEN SATURATION: 94 % | WEIGHT: 235.45 LBS | SYSTOLIC BLOOD PRESSURE: 169 MMHG | HEART RATE: 108 BPM | HEIGHT: 63 IN | BODY MASS INDEX: 41.72 KG/M2 | TEMPERATURE: 96.1 F

## 2025-07-30 DIAGNOSIS — Z12.11 COLON CANCER SCREENING: Primary | ICD-10-CM

## 2025-07-30 PROCEDURE — G0121 COLON CA SCRN NOT HI RSK IND: HCPCS | Performed by: INTERNAL MEDICINE

## 2025-07-30 PROCEDURE — 7100000010 HC PHASE TWO TIME - EACH INCREMENTAL 1 MINUTE

## 2025-07-30 PROCEDURE — 7100000009 HC PHASE TWO TIME - INITIAL BASE CHARGE

## 2025-07-30 PROCEDURE — 2500000004 HC RX 250 GENERAL PHARMACY W/ HCPCS (ALT 636 FOR OP/ED): Performed by: INTERNAL MEDICINE

## 2025-07-30 PROCEDURE — G0500 MOD SEDAT ENDO SERVICE >5YRS: HCPCS | Performed by: INTERNAL MEDICINE

## 2025-07-30 PROCEDURE — 3600000007 HC OR TIME - EACH INCREMENTAL 1 MINUTE - PROCEDURE LEVEL TWO

## 2025-07-30 PROCEDURE — 3600000002 HC OR TIME - INITIAL BASE CHARGE - PROCEDURE LEVEL TWO

## 2025-07-30 PROCEDURE — 3700000012 HC SEDATION LEVEL 5+ TIME - INITIAL 15 MINUTES 5/> YEARS

## 2025-07-30 RX ORDER — ONDANSETRON HYDROCHLORIDE 2 MG/ML
4 INJECTION, SOLUTION INTRAVENOUS ONCE
Status: COMPLETED | OUTPATIENT
Start: 2025-07-30 | End: 2025-07-30

## 2025-07-30 RX ORDER — MIDAZOLAM HYDROCHLORIDE 5 MG/ML
INJECTION, SOLUTION INTRAMUSCULAR; INTRAVENOUS AS NEEDED
Status: COMPLETED | OUTPATIENT
Start: 2025-07-30 | End: 2025-07-30

## 2025-07-30 RX ORDER — MIDAZOLAM HYDROCHLORIDE 1 MG/ML
INJECTION, SOLUTION INTRAMUSCULAR; INTRAVENOUS AS NEEDED
Status: COMPLETED | OUTPATIENT
Start: 2025-07-30 | End: 2025-07-30

## 2025-07-30 RX ORDER — FENTANYL CITRATE 50 UG/ML
INJECTION, SOLUTION INTRAMUSCULAR; INTRAVENOUS AS NEEDED
Status: COMPLETED | OUTPATIENT
Start: 2025-07-30 | End: 2025-07-30

## 2025-07-30 RX ORDER — SODIUM CHLORIDE, SODIUM LACTATE, POTASSIUM CHLORIDE, CALCIUM CHLORIDE 600; 310; 30; 20 MG/100ML; MG/100ML; MG/100ML; MG/100ML
20 INJECTION, SOLUTION INTRAVENOUS CONTINUOUS
Status: DISCONTINUED | OUTPATIENT
Start: 2025-07-30 | End: 2025-07-31 | Stop reason: HOSPADM

## 2025-07-30 RX ADMIN — SODIUM CHLORIDE, SODIUM LACTATE, POTASSIUM CHLORIDE, AND CALCIUM CHLORIDE 20 ML/HR: .6; .31; .03; .02 INJECTION, SOLUTION INTRAVENOUS at 07:22

## 2025-07-30 RX ADMIN — FENTANYL CITRATE 50 MCG: 50 INJECTION, SOLUTION INTRAMUSCULAR; INTRAVENOUS at 07:41

## 2025-07-30 RX ADMIN — MIDAZOLAM HYDROCHLORIDE 2.5 MG: 5 INJECTION, SOLUTION INTRAMUSCULAR; INTRAVENOUS at 07:42

## 2025-07-30 RX ADMIN — MIDAZOLAM 1 MG: 1 INJECTION INTRAMUSCULAR; INTRAVENOUS at 07:46

## 2025-07-30 RX ADMIN — FENTANYL CITRATE 50 MCG: 50 INJECTION, SOLUTION INTRAMUSCULAR; INTRAVENOUS at 07:38

## 2025-07-30 RX ADMIN — MIDAZOLAM 2.5 MG: 1 INJECTION INTRAMUSCULAR; INTRAVENOUS at 07:38

## 2025-07-30 RX ADMIN — ONDANSETRON 4 MG: 2 INJECTION INTRAMUSCULAR; INTRAVENOUS at 07:30

## 2025-07-30 ASSESSMENT — PAIN SCALES - GENERAL
PAINLEVEL_OUTOF10: 0 - NO PAIN
PAINLEVEL_OUTOF10: 0 - NO PAIN
PAINLEVEL_OUTOF10: 1
PAINLEVEL_OUTOF10: 0 - NO PAIN
PAINLEVEL_OUTOF10: 0 - NO PAIN
PAINLEVEL_OUTOF10: 2
PAINLEVEL_OUTOF10: 0 - NO PAIN
PAINLEVEL_OUTOF10: 1
PAINLEVEL_OUTOF10: 0 - NO PAIN
PAINLEVEL_OUTOF10: 0 - NO PAIN

## 2025-07-30 ASSESSMENT — PAIN - FUNCTIONAL ASSESSMENT
PAIN_FUNCTIONAL_ASSESSMENT: 0-10
PAIN_FUNCTIONAL_ASSESSMENT: 0-10

## 2025-07-30 ASSESSMENT — COLUMBIA-SUICIDE SEVERITY RATING SCALE - C-SSRS
1. IN THE PAST MONTH, HAVE YOU WISHED YOU WERE DEAD OR WISHED YOU COULD GO TO SLEEP AND NOT WAKE UP?: NO
6. HAVE YOU EVER DONE ANYTHING, STARTED TO DO ANYTHING, OR PREPARED TO DO ANYTHING TO END YOUR LIFE?: NO
2. HAVE YOU ACTUALLY HAD ANY THOUGHTS OF KILLING YOURSELF?: NO

## 2025-07-30 NOTE — H&P
History Of Present Illness  Av Bustos is a 69 y.o. male presenting with colon cancer screening.     Past Medical History  Medical History[1]  Surgical History  Surgical History[2]  Social History  He reports that he has never smoked. He has never been exposed to tobacco smoke. He has never used smokeless tobacco. He reports that he does not currently use alcohol. He reports that he does not use drugs.    Family History  Family History[3]     Allergies  Allergies[4]  Review of Systems  Pre-sedation Evaluation:  ASA Classification - ASA 2 - Patient with mild systemic disease with no functional limitations  Mallampati Score - II (hard and soft palate, upper portion of tonsils and uvula visible)    Physical Exam  Constitutional:       General: He is awake.      Appearance: Normal appearance.   HENT:      Head: Normocephalic and atraumatic.      Nose: Nose normal.      Mouth/Throat:      Mouth: Mucous membranes are moist.     Eyes:      Pupils: Pupils are equal, round, and reactive to light.     Neck:      Thyroid: No thyroid mass.      Trachea: Phonation normal.     Cardiovascular:      Rate and Rhythm: Normal rate and regular rhythm.      Heart sounds: Normal heart sounds.   Pulmonary:      Effort: Pulmonary effort is normal. No respiratory distress.      Breath sounds: Normal air entry. No decreased breath sounds, wheezing, rhonchi or rales.   Abdominal:      General: Bowel sounds are normal. There is no distension.      Palpations: Abdomen is soft.      Tenderness: There is no abdominal tenderness.     Musculoskeletal:      Cervical back: Neck supple.      Right lower leg: No edema.      Left lower leg: No edema.     Skin:     General: Skin is warm.      Capillary Refill: Capillary refill takes less than 2 seconds.     Neurological:      General: No focal deficit present.      Mental Status: He is alert and oriented to person, place, and time. Mental status is at baseline.      Cranial Nerves: Cranial nerves  "2-12 are intact.      Motor: Motor function is intact.     Psychiatric:         Attention and Perception: Attention and perception normal.         Mood and Affect: Mood normal.         Speech: Speech normal.         Behavior: Behavior normal.          Last Recorded Vitals  Blood pressure (!) 194/101, pulse (!) 115, temperature 36.4 °C (97.5 °F), temperature source Temporal, resp. rate 16, height 1.6 m (5' 2.99\"), weight 107 kg (235 lb 7.2 oz), SpO2 97%.     Assessment/Plan   Problem List Items Addressed This Visit    None  Visit Diagnoses         Colon cancer screening    -  Primary    Relevant Orders    Colonoscopy Screening; Average Risk Patient               PTA/Current Medications:  Prescriptions Prior to Admission[5]  Current Medications[6]  Franklin Keating DO       [1]   Past Medical History:  Diagnosis Date    Anxiety 30 YEARS AGO    Arthritis     Cataract     Chronic kidney disease 2023    Diabetes mellitus (Multi) 2010    Encounter for screening for malignant neoplasm of prostate     Prostate cancer screening    Essential (primary) hypertension 02/05/2018    HTN (hypertension), benign    Essential (primary) hypertension 05/07/2018    Benign essential hypertension    Essential (primary) hypertension 05/22/2018    Benign essential hypertension    Glaucoma     Gout 2000    Headache     HL (hearing loss)     Personal history of other drug therapy 09/09/2019    History of influenza vaccination    Personal history of other endocrine, nutritional and metabolic disease 02/05/2018    History of type 2 diabetes mellitus    Personal history of other endocrine, nutritional and metabolic disease 05/07/2018    History of type 2 diabetes mellitus    Personal history of other endocrine, nutritional and metabolic disease 05/22/2018    History of type 2 diabetes mellitus    Personal history of other endocrine, nutritional and metabolic disease 09/10/2018    History of type 2 diabetes mellitus    Personal history of other " endocrine, nutritional and metabolic disease 12/03/2018    History of type 2 diabetes mellitus    Personal history of other endocrine, nutritional and metabolic disease 03/20/2020    History of type 2 diabetes mellitus    Personal history of other endocrine, nutritional and metabolic disease 09/09/2019    History of type 2 diabetes mellitus    Personal history of other endocrine, nutritional and metabolic disease 06/26/2018    History of type 2 diabetes mellitus    Personal history of other endocrine, nutritional and metabolic disease 06/03/2019    History of type 2 diabetes mellitus    Personal history of other endocrine, nutritional and metabolic disease 12/15/2020    History of type 2 diabetes mellitus    Sleep apnea 2021    Visual impairment WEAR GLASSES FOR DISTANCE   [2]   Past Surgical History:  Procedure Laterality Date    COLONOSCOPY  08/10/2020    repeat 5 yrs    JOINT REPLACEMENT  TOTAL LEFT HIP  03/2021    OTHER SURGICAL HISTORY  08/16/2021    Hip replacement    WISDOM TOOTH EXTRACTION     [3]   Family History  Problem Relation Name Age of Onset    COPD Mother INES QUIL     Dementia Mother INES QUIL     Alcohol abuse Mother INES QUIL     Depression Mother INES QUIL     Bone cancer Father EJSS  QUIL     Alcohol abuse Father JESS  QUIL     Cancer Father JESS  QUIL     Stroke Father JESS  QUIL    [4]   Allergies  Allergen Reactions    Hydrochlorothiazide Other     Worsening renal function   [5] (Not in a hospital admission)  [6]   Current Outpatient Medications   Medication Sig Dispense Refill    cloNIDine (Catapres) 0.2 mg tablet Take 1 tablet (0.2 mg) by mouth every 8 hours. 270 tablet 3    dilTIAZem CD (Cardizem CD) 240 mg 24 hr capsule Take 1 capsule (240 mg) by mouth once daily. 90 capsule 3    finerenone (Kerendia) 10 mg tablet tablet Take 1 tablet (10 mg) by mouth once daily. 90 tablet 3    FLUoxetine (PROzac) 10 mg capsule Take 1 capsule (10 mg) by mouth early in the morning..       losartan (Cozaar) 100 mg tablet Take 1 tablet (100 mg) by mouth once daily. 90 tablet 3    metFORMIN (Glucophage) 1,000 mg tablet Take 1 tablet (1,000 mg) by mouth 2 times daily (morning and late afternoon). 180 tablet 3    metoprolol succinate XL (Toprol-XL) 100 mg 24 hr tablet Take 1 tablet (100 mg) by mouth once daily. Do not crush or chew. 90 tablet 3    omeprazole (PriLOSEC) 20 mg DR capsule Take 1 capsule (20 mg) by mouth once daily. Do not crush or chew. 30 capsule 11    pioglitazone (Actos) 15 mg tablet Take 1 tablet (15 mg) by mouth once daily. 30 tablet 11    rosuvastatin (Crestor) 5 mg tablet Take 1 tablet (5 mg) by mouth once daily. 90 tablet 3    cholecalciferol, vitamin D3, (VITAMIN D3 ORAL) Take 1 tablet by mouth once daily.      dapagliflozin propanediol (Farxiga) 10 mg tablet Take 1 tablet (10 mg) by mouth once daily. 90 tablet 1    ezetimibe (Zetia) 10 mg tablet Take 1 tablet (10 mg) by mouth once daily. 90 tablet 3    FreeStyle Maya 2 Sensor kit Inject 1 each under the skin every 14 (fourteen) days. 6 each 3    insulin degludec (Tresiba FlexTouch U-100) 100 unit/mL (3 mL) pen Inject 40 Units under the skin 2 times a day. Take as directed per insulin instructions. 75 mL 1    LORazepam (Ativan) 0.5 mg tablet Take 1 tablet (0.5 mg) by mouth every 6 hours if needed for anxiety for up to 7 days. 28 tablet 0    multivit-min-folic-vit K-lycop 400- mcg tablet       OneTouch Delica Plus Lancet 33 gauge misc CHECK BLOOD SUGARS 3 TIMES A DAY      OneTouch Ultra Test strip CHECK BLOOD SUGAR THREE TIMES DAILY      semaglutide (Ozempic) 1 mg/dose (4 mg/3 mL) pen injector Inject 1 mg under the skin 1 (one) time per week. 3 mL 2    Slow-Mag 71.5 mg tablet,delayed release (DR/EC) Take 1 tablet (71.5 mg) by mouth early in the morning..       Current Facility-Administered Medications   Medication Dose Route Frequency Provider Last Rate Last Admin    lactated Ringer's infusion  20 mL/hr intravenous Continuous  Franklin Keating,  20 mL/hr at 07/30/25 0722 20 mL/hr at 07/30/25 0722

## 2025-07-30 NOTE — DISCHARGE INSTRUCTIONS
Patient Instructions after a Colonoscopy      The anesthetics, sedatives or narcotics which were given to you today will be acting in your body for the next 24 hours, so you might feel a little sleepy or groggy.  This feeling should slowly wear off. Carefully read and follow the instructions.     You received sedation today:  - Do not drive or operate any machinery or power tools of any kind.   - No alcoholic beverages today, not even beer or wine.  - Do not make any important decisions or sign any legal documents.  - No over the counter medications that contain alcohol or that may cause drowsiness.  - Do not make any important decisions or sign any legal documents.  - Make sure you have someone with you for first 24 hours.    While it is common to experience mild to moderate abdominal distention, gas, or belching after your procedure, if any of these symptoms occur following discharge from the GI Lab or within one week of having your procedure, call the [facility] to be advised whether a visit to your nearest Urgent Care or Emergency Department is indicated.  Take this paper with you if you go.     - If you develop an allergic reaction to the medications that were given during your procedure such as difficulty breathing, rash, hives, severe nausea, vomiting or lightheadedness.  - If you experience chest pain, shortness of breath, severe abdominal pain, fevers and chills.  -If you develop signs and symptoms of bleeding such as blood in your spit, if your stools turn black, tarry, or bloody  - If you have not urinated within 8 hours following your procedure.  - If your IV site becomes painful, red, inflamed, or looks infected.    If you received a biopsy/polypectomy/sphincterotomy the following instructions apply below:    __ Do not use Aspirin containing products, non-steroidal medications or anti-coagulants for one week following your procedure. (Examples of these types of medications are: Advil,  Arthrotec, Aleve, Coumadin, Ecotrin, Heparin, Ibuprofen, Indocin, Motrin, Naprosyn, Nuprin, Plavix, Vioxx, and Voltarin, or their generic forms.  This list is not all-inclusive.  Check with your physician or pharmacist before resuming medications.)   __ Eat a soft diet today.  Avoid foods that are poorly digested for the next 24 hours.  These foods would include: nuts, beans, lettuce, red meats, and fried foods. Start with liquids and advance your diet as tolerated, gradually work up to eating solids.   __ Do not have a Barium Study or Enema for one week.    Your physician recommends the additional following instructions:    -You have a contact number available for emergencies. The signs and symptoms of potential delayed complications were discussed with you. You may return to normal activities tomorrow.  -Resume your previous diet.  -Continue your present medications.   -We are waiting for your pathology results.  -Your physician has recommended a repeat colonoscopy (date to be determined after pending pathology results are reviewed) for surveillance based on pathology results.  -The findings and recommendations have been discussed with you.  -The findings and recommendations were discussed with your family.  - Please see Medication Reconciliation Form for new medication/medications prescribed.       If you experience any problems or have any questions following discharge from the GI Lab, please call:        Nurse Signature                                                                        Date___________________                                                                            Patient/Responsible Party Signature                                        Date___________________

## 2025-10-16 ENCOUNTER — APPOINTMENT (OUTPATIENT)
Age: 70
End: 2025-10-16
Payer: MEDICARE

## 2025-10-28 ENCOUNTER — APPOINTMENT (OUTPATIENT)
Dept: CARDIOLOGY | Facility: CLINIC | Age: 70
End: 2025-10-28
Payer: MEDICARE

## 2025-12-12 ENCOUNTER — APPOINTMENT (OUTPATIENT)
Dept: NEPHROLOGY | Facility: CLINIC | Age: 70
End: 2025-12-12
Payer: MEDICARE